# Patient Record
Sex: FEMALE | Race: WHITE | NOT HISPANIC OR LATINO | Employment: FULL TIME | URBAN - METROPOLITAN AREA
[De-identification: names, ages, dates, MRNs, and addresses within clinical notes are randomized per-mention and may not be internally consistent; named-entity substitution may affect disease eponyms.]

---

## 2017-01-25 ENCOUNTER — TELEPHONE (OUTPATIENT)
Dept: FAMILY MEDICINE | Facility: CLINIC | Age: 42
End: 2017-01-25

## 2017-01-25 NOTE — TELEPHONE ENCOUNTER
----- Message from Zita Duong sent at 2017  3:00 PM CST -----  Regarding: RE- LAB  Hi     JULIO Diaz [[id number]] is schedule for lab only appointment on 2017,but her lab orders . Would you extended for us when you have time.    Thanks.    Zita/Lab

## 2017-02-02 ENCOUNTER — OFFICE VISIT (OUTPATIENT)
Dept: FAMILY MEDICINE | Facility: CLINIC | Age: 42
End: 2017-02-02
Payer: COMMERCIAL

## 2017-02-02 VITALS — WEIGHT: 145 LBS | BODY MASS INDEX: 25.69 KG/M2 | TEMPERATURE: 98.8 F | HEIGHT: 63 IN

## 2017-02-02 DIAGNOSIS — Z23 NEED FOR VACCINATION: ICD-10-CM

## 2017-02-02 DIAGNOSIS — Z83.49 FAMILY HISTORY OF HEMOCHROMATOSIS: ICD-10-CM

## 2017-02-02 DIAGNOSIS — Z71.84 TRAVEL ADVICE ENCOUNTER: Primary | ICD-10-CM

## 2017-02-02 LAB
FERRITIN SERPL-MCNC: 91 NG/ML (ref 12–150)
IRON SATN MFR SERPL: 31 % (ref 15–46)
IRON SERPL-MCNC: 76 UG/DL (ref 35–180)
TIBC SERPL-MCNC: 246 UG/DL (ref 240–430)
TRANSFERRIN SERPL-MCNC: 225 MG/DL (ref 210–360)

## 2017-02-02 PROCEDURE — 84466 ASSAY OF TRANSFERRIN: CPT | Performed by: FAMILY MEDICINE

## 2017-02-02 PROCEDURE — 99402 PREV MED CNSL INDIV APPRX 30: CPT | Mod: 25 | Performed by: NURSE PRACTITIONER

## 2017-02-02 PROCEDURE — 90632 HEPA VACCINE ADULT IM: CPT | Mod: GA | Performed by: NURSE PRACTITIONER

## 2017-02-02 PROCEDURE — 83550 IRON BINDING TEST: CPT | Performed by: FAMILY MEDICINE

## 2017-02-02 PROCEDURE — 90472 IMMUNIZATION ADMIN EACH ADD: CPT | Mod: GA | Performed by: NURSE PRACTITIONER

## 2017-02-02 PROCEDURE — 36415 COLL VENOUS BLD VENIPUNCTURE: CPT | Performed by: FAMILY MEDICINE

## 2017-02-02 PROCEDURE — 90691 TYPHOID VACCINE IM: CPT | Mod: GA | Performed by: NURSE PRACTITIONER

## 2017-02-02 PROCEDURE — 82728 ASSAY OF FERRITIN: CPT | Performed by: FAMILY MEDICINE

## 2017-02-02 PROCEDURE — 83540 ASSAY OF IRON: CPT | Performed by: FAMILY MEDICINE

## 2017-02-02 PROCEDURE — 90471 IMMUNIZATION ADMIN: CPT | Mod: GA | Performed by: NURSE PRACTITIONER

## 2017-02-02 PROCEDURE — 81256 HFE GENE: CPT | Performed by: FAMILY MEDICINE

## 2017-02-02 RX ORDER — AZITHROMYCIN 500 MG/1
500 TABLET, FILM COATED ORAL DAILY
Qty: 3 TABLET | Refills: 0 | Status: SHIPPED | OUTPATIENT
Start: 2017-02-02 | End: 2017-02-05

## 2017-02-02 NOTE — PATIENT INSTRUCTIONS
Today February 2, 2017 you received the    Hepatitis A Vaccine - Please return on 8/1/17 or later for your 2nd and final dose.    Typhoid - injectable. This vaccine is valid for two years.   .    These appointments can be made as a NURSE ONLY visit.    **It is very important for the vaccinations to be given on the scheduled day(s), this helps ensure you receive the full effectiveness of the vaccine.**    Please call [facility] with any questions [phone]    Thank you for visiting [facility]

## 2017-02-02 NOTE — PROGRESS NOTES
Nurse Note      Itinerary:  [place]      Departure Date: 2-12-17      Return Date: 2-19-17      Length of Trip 1 week      Reason for Travel: Tourism           Urban or rural: both      Accommodations: Hotel        IMMUNIZATION HISTORY  Have you received any immunizations within the past 4 weeks?  No  Have you ever fainted from having your blood drawn or from an injection?  No  Have you ever had a fever reaction to vaccination?  No  Have you ever had any bad reaction or side effect from any vaccination?  No  Have you ever had hepatitis A or B vaccine?  unknown  Do you live (or work closely) with anyone who has AIDS, an AIDS-like condition, any other immune disorder or who is on chemotherapy for cancer?  No  Do you have a family history of immunodeficiency?  No  Have you received any injection of immune globulin or any blood products during the past 12 months?  No    Patient roomed by JAZMIN Lazo  Yoselyn Mcgill is a 41 year old female seen today alone for counsultation for international travel to [place] for Tourism.  Patient will be departing in  10 day(s) and staying for   1 week(s) and  traveling with family member(s).      Patient itinerary :  will be in the resort region of [place] which presents risk for Dengue Fever, Chikungungya, Zika, Rabies, food borne illnesses, motor vehicle accidents, Typhoid and Chagas disease. exposure.      Patient's activities will include sightseeing and beach activities (salt water).    Patient's country of birth is [place]    Special medical concerns: none  Pre-travel questionnaire was completed by patient and reviewed by provider.     Vitals: There were no vitals taken for this visit.  BMI= There is no weight on file to calculate BMI.    EXAM:  General:  Well-nourished, well-developed in no acute distress.  Appears to be stated age, interacts appropriately and expresses understanding of information given to patient.    Current Outpatient Prescriptions    Medication Sig Dispense Refill     LORazepam (ATIVAN) 0.5 MG tablet Take 1 tablet (0.5 mg) by mouth every 8 hours as needed for anxiety 20 tablet 0     norethindrone (MICRONOR) 0.35 MG per tablet Take 1 tablet (0.35 mg) by mouth daily 28 tablet 11     neomycin-polymyxin-hydrocortisone (CORTISPORIN) 3.5-00567-3 otic suspension Place 4 drops in ear(s) 4 times daily 10 mL 0     Prenatal Multivit-Min-Fe-FA (PRENATAL VITAMINS PO)        Omega-3 Fatty Acids (OMEGA-3 FISH OIL PO)        VITAMIN D, CHOLECALCIFEROL, PO Take by mouth daily       Probiotic Product (PRO-BIOTIC BLEND PO)        Patient Active Problem List   Diagnosis     Dysplasia of cervix     CARDIOVASCULAR SCREENING; LDL GOAL LESS THAN 160     Family history of malignant neoplasm of breast     S/P  section     Family history of hemochromatosis     Allergies   Allergen Reactions     Amoxicillin Rash         Immunizations discussed include:   Hepatitis A:  Ordered/given today, risks, benefits and side effects reviewed  Hepatitis B: Declined  Not concerned about risk of disease  Influenza: Up to date  Typhoid: Ordered/given today, risks, benefits and side effects reviewed  Rabies: Declined  Not concerned about risk of disease  Yellow Fever: Not indicated  Japanese Encephalitis: Not indicated  Meningococcus: Not indicated  Tetanus/Diphtheria: Up to date  Measles/Mumps/Rubella: Up to date  Cholera: Not needed  Polio: Up to date  Pneumococcal: Under age of 65  Varicella: Immune by disease history per patient report  Zostavax:  Not indicated  HPV:  Not indicated  TB:  Low risk    Altitude Exposure on this trip: No    ASSESSMENT/PLAN:    ICD-10-CM    1. Travel advice encounter Z71.89 TYPHOID VACCINE, IM     HEPA VACCINE ADULT IM     azithromycin (ZITHROMAX) 500 MG tablet   2. Need for vaccination Z23 TYPHOID VACCINE, IM     HEPA VACCINE ADULT IM     I have reviewed general recommendations for safe travel   including: food/water precautions, insect  precautions, safer sex   practices given high prevalence of Zika, HIV and other STDs,   roadway safety. Educational materials and [employer] report provided.    Malaraia prophylaxis recommended: none  Symptomatic treatment for traveler's diarrhea: azithromycin  Altitude illness prevention and treatment: no      Evacuation insurance advised and resources were provided to patient.    Total visit time 30 minutes  with over 50% of time spent counseling patient as detailed above.    Irene Martinez CNP

## 2017-02-02 NOTE — MR AVS SNAPSHOT
After Visit Summary   2/2/2017    Yoselyn Mcgill    MRN: [id number]           Patient Information     Date Of Birth          1975        Visit Information        Provider Department      2/2/2017 8:30 AM Irene Martinez APRN [facility]        Today's Diagnoses     Travel advice encounter    -  1     Need for vaccination           Care Instructions    Today February 2, 2017 you received the    Hepatitis A Vaccine - Please return on 8/1/17 or later for your 2nd and final dose.    Typhoid - injectable. This vaccine is valid for two years.   .    These appointments can be made as a NURSE ONLY visit.    **It is very important for the vaccinations to be given on the scheduled day(s), this helps ensure you receive the full effectiveness of the vaccine.**    Please call [facility] with any questions [phone]    Thank you for visiting [facility]            Follow-ups after your visit        Who to contact     If you have questions or need follow up information about [facility]'s clinic visit or your schedule please contact [facility] directly at [phone].  Normal or non-critical lab and imaging results will be communicated to you by [employer], letter or phone within 4 business days after the clinic has received the results. If you do not hear from us within 7 days, please contact the clinic through [email or web address] or phone. If you have a critical or abnormal lab result, we will notify you by phone as soon as possible.  Submit refill requests through Vitals ([email or web address]) or call your pharmacy and they will forward the refill request to us. Please allow 3 business days for your refill to be completed.          Additional Information About Your Visit        [employer] Information     Vitals [email or web address]) gives you secure access to your electronic health record. If you see a primary care provider, you can also send messages to your care team and make appointments. If  "you have questions, please call your primary care clinic.  If you do not have a primary care provider, please call [phone] and they will assist you.        Care EveryWhere ID     This is your Care EveryWhere ID. This could be used by other organizations to access your [facility] medical records  [id number]        Your Vitals Were     Temperature Height BMI (Body Mass Index) Breastfeeding?          98.8  F (37.1  C) (Oral) 5' 3\" (1.6 m) 25.69 kg/m2 Yes         Blood Pressure from Last 3 Encounters:   09/30/16 122/78   06/17/16 121/79   06/15/16 118/76    Weight from Last 3 Encounters:   02/02/17 145 lb (65.772 kg)   09/30/16 140 lb (63.504 kg)   06/17/16 142 lb (64.411 kg)              We Performed the Following     HEPA VACCINE ADULT IM     TYPHOID VACCINE, IM          Today's Medication Changes          These changes are accurate as of: 2/2/17  9:06 AM.  If you have any questions, ask your nurse or doctor.               Start taking these medicines.        Dose/Directions    azithromycin 500 MG tablet   Commonly known as:  ZITHROMAX   Used for:  Travel advice encounter   Started by:  Irene Martinez APRN CNP        Dose:  500 mg   Take 1 tablet (500 mg) by mouth daily for 3 doses Take 1 tablet a day for up to 3 days for severe diarrhea   Quantity:  3 tablet   Refills:  0            Where to get your medicines      These medications were sent to [facility]/pharmacy #[id number] - [place], [place] - [address]  [address], [place] [address]     Phone:  [phone]    - azithromycin 500 MG tablet             Primary Care Provider Office Phone # Fax #    Elisabeth Powell [phone]       [facility] [address] [address]        Thank you!     Thank you for choosing [facility]  for your care. Our goal is always to provide you with excellent care. Hearing back from our patients is one way we can continue to improve our services. Please take " a few minutes to complete the written survey that you may receive in the mail after your visit with us. Thank you!             Your Updated Medication List - Protect others around you: Learn how to safely use, store and throw away your medicines at www.disposemymeds.org.          This list is accurate as of: 2/2/17  9:06 AM.  Always use your most recent med list.                   Brand Name Dispense Instructions for use    azithromycin 500 MG tablet    ZITHROMAX    3 tablet    Take 1 tablet (500 mg) by mouth daily for 3 doses Take 1 tablet a day for up to 3 days for severe diarrhea       LORazepam 0.5 MG tablet    ATIVAN    20 tablet    Take 1 tablet (0.5 mg) by mouth every 8 hours as needed for anxiety       neomycin-polymyxin-hydrocortisone 3.5-96235-9 otic suspension    CORTISPORIN    10 mL    Place 4 drops in ear(s) 4 times daily       norethindrone 0.35 MG per tablet    MICRONOR    28 tablet    Take 1 tablet (0.35 mg) by mouth daily       OMEGA-3 FISH OIL PO          PRENATAL VITAMINS PO          PRO-BIOTIC BLEND PO          VITAMIN D (CHOLECALCIFEROL) PO      Take by mouth daily

## 2017-02-07 ENCOUNTER — TELEPHONE (OUTPATIENT)
Dept: FAMILY MEDICINE | Facility: CLINIC | Age: 42
End: 2017-02-07

## 2017-02-07 PROBLEM — E83.110 HEREDITARY HEMOCHROMATOSIS (H): Status: ACTIVE | Noted: 2017-02-07

## 2017-02-07 LAB — COPATH REPORT: NORMAL

## 2017-02-07 NOTE — TELEPHONE ENCOUNTER
Reason for Call:  Other call back    Detailed comments: pt is looking to speak with NP or nurse in regards to having sleep issues. Wants advise    Phone Number Patient can be reached at: Home number on file [phone] (home)    Best Time: anytime    Can we leave a detailed message on this number? YES    Call taken on 2/7/2017 at 2:27 PM by Luiza Presley

## 2017-02-07 NOTE — TELEPHONE ENCOUNTER
Patient informed; states the 745am and 1030am won't work.  Other appt is from 10-11am.  She will keep 3pm appt and come back then.  Isabella PANCHAL RN

## 2017-02-08 NOTE — PROGRESS NOTES
Quick Note:    Dear Yoselyn,   Your test results are all back -   The iron studies are all normal.  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 The good news is you only have one copy of this mutation which often doesn't manifest clinically. We should consider following your iron tests annually.   Let us know if you have any questions.  -Elisabeth Powell, DO    ______

## 2017-02-10 ENCOUNTER — OFFICE VISIT (OUTPATIENT)
Dept: FAMILY MEDICINE | Facility: CLINIC | Age: 42
End: 2017-02-10
Payer: COMMERCIAL

## 2017-02-10 VITALS
HEART RATE: 84 BPM | WEIGHT: 141.2 LBS | DIASTOLIC BLOOD PRESSURE: 70 MMHG | TEMPERATURE: 99.2 F | HEIGHT: 63 IN | OXYGEN SATURATION: 100 % | SYSTOLIC BLOOD PRESSURE: 116 MMHG | BODY MASS INDEX: 25.02 KG/M2

## 2017-02-10 DIAGNOSIS — F41.1 GENERALIZED ANXIETY DISORDER: ICD-10-CM

## 2017-02-10 DIAGNOSIS — G47.09 OTHER INSOMNIA: Primary | ICD-10-CM

## 2017-02-10 DIAGNOSIS — E83.110 HEREDITARY HEMOCHROMATOSIS (H): ICD-10-CM

## 2017-02-10 PROCEDURE — 99214 OFFICE O/P EST MOD 30 MIN: CPT | Performed by: FAMILY MEDICINE

## 2017-02-10 RX ORDER — TRAZODONE HYDROCHLORIDE 50 MG/1
50 TABLET, FILM COATED ORAL
Qty: 30 TABLET | Refills: 3 | Status: SHIPPED | OUTPATIENT
Start: 2017-02-10 | End: 2017-05-05

## 2017-02-10 RX ORDER — LORAZEPAM 0.5 MG/1
0.5 TABLET ORAL EVERY 8 HOURS PRN
Qty: 20 TABLET | Refills: 0 | Status: SHIPPED | OUTPATIENT
Start: 2017-02-10 | End: 2017-12-30

## 2017-02-10 NOTE — PROGRESS NOTES
"  SUBJECTIVE:                                                    Yoselyn Mcgill is a 41 year old female who presents to clinic today for the following health issues:      insomnia      Duration: 8 weeks    Description (location/character/radiation): no problem falling asleep-its staying asleep    Intensity:  moderate    Accompanying signs and symptoms: n/a    History (similar episodes/previous evaluation): previous issue with anxiety/insomnia--7-10 years ago    Precipitating or alleviating factors: improvement with lorazepam    Therapies tried and outcome: magnesium, some herbs, benadryl, melatonin, unisom, no real improvement--but improvement with the lorazepam     Waking at 1 am - and is up to 6 am  Has 19 month old - wakes some nights -     In the past was treated with xanax, trazodone and seroquel  Recently had MAO-inhibitor - nardil    Feels like lack of sleep is making her more irritable  One arvin tea in AM  Walking most days  Read before going to bed - book  Has magnesium supplement before bed  Worked with acupuncturist and taking herb supplement at bedtime  Tried benadryl, and OTC   Tried melatonin -   Very infrequent ETOH -   Taking lorazepam and when uses to helps fall back asleep easier    -------------------------------------    Problem list and histories reviewed & adjusted, as indicated.  Additional history: as documented    Problem list, Medication list, Allergies, and Medical/Social/Surgical histories reviewed in EPIC and updated as appropriate.    ROS:  Constitutional, HEENT, cardiovascular, pulmonary, gi and gu systems are negative, except as otherwise noted.    OBJECTIVE:                                                    /70 mmHg  Pulse 84  Temp(Src) 99.2  F (37.3  C) (Oral)  Ht 5' 3\" (1.6 m)  Wt 141 lb 3.2 oz (64.048 kg)  BMI 25.02 kg/m2  SpO2 100%  Body mass index is 25.02 kg/(m^2).  GENERAL: healthy, alert and no distress    Diagnostic Test Results:  none      ASSESSMENT/PLAN:  "                                                   1. Other insomnia  Insomnia - suspect related to anxiety but unclear if the anxiety is causing or sleepless nights are causing anxiety  Will try trazodone - nightly as needed - may try 25mg up to 50mg and see what works best -   Discussed risk vs benefits   - traZODone (DESYREL) 50 MG tablet; Take 1 tablet (50 mg) by mouth nightly as needed for sleep  Dispense: 30 tablet; Refill: 3    2. Generalized anxiety disorder  Pt has lorazepam - prn infrequent use -   Patient advised and aware not compatible with breastfeeding - she will avoid for up to 8 hours after taking medication.  - LORazepam (ATIVAN) 0.5 MG tablet; Take 1 tablet (0.5 mg) by mouth every 8 hours as needed for anxiety  Dispense: 20 tablet; Refill: 0    3. Hereditary hemochromatosis (H)  One gene positive -   Discussed asking her father who is symptomatic to check to see if he is homozygote vs heterozygote and what gene he is positive for.   If homozygote - will just monitor patient  If he only has one copy and so symptomatic would have her see genetics      I spent over 25 minutes with patient and over 50% time in counseling regarding above issues.     Elisabeth Powell, DO  [facility]

## 2017-05-02 ENCOUNTER — TELEPHONE (OUTPATIENT)
Dept: FAMILY MEDICINE | Facility: CLINIC | Age: 42
End: 2017-05-02

## 2017-05-02 DIAGNOSIS — G47.09 OTHER INSOMNIA: ICD-10-CM

## 2017-05-02 NOTE — TELEPHONE ENCOUNTER
Noted at 2/10/2017 OV:   Other insomnia  Insomnia - suspect related to anxiety but unclear if the anxiety is causing or sleepless nights are causing anxiety  Will try trazodone - nightly as needed - may try 25mg up to 50mg and see what works best -   Discussed risk vs benefits     Left message for patient to callback.  She's been taking 100mg?  Isabella PANCHAL RN

## 2017-05-02 NOTE — TELEPHONE ENCOUNTER
Reason for Call:  Trazodone    Detailed comments: Patient asked if she can take 2HS she said it works  Better for her and if she can take this please update the RX    [id number] - [place], [place] - [address]  (to Profile New dose at Pharmacy)      Phone Number Patient can be reached at: Home number on file [phone] (home)    Best Time: anytime    Can we leave a detailed message on this number? YES    Call taken on 5/2/2017 at 4:49 PM by Cem Dennis

## 2017-05-05 RX ORDER — TRAZODONE HYDROCHLORIDE 50 MG/1
50-100 TABLET, FILM COATED ORAL
Qty: 60 TABLET | Refills: 5 | Status: SHIPPED | OUTPATIENT
Start: 2017-05-05 | End: 2017-05-26

## 2017-05-05 NOTE — TELEPHONE ENCOUNTER
PN,  Please see below messages.  Tried to reach pt again today to confirm dose she wants.  You mentioned at OV to go up to 50mg   Per below, pt wanting to take 2 tabs (probably 100mg since you Rx'd 50mg tabs)  Are you ok with this?  Isabella PANCHAL RN

## 2017-05-13 ENCOUNTER — TELEPHONE (OUTPATIENT)
Dept: NURSING | Facility: CLINIC | Age: 42
End: 2017-05-13

## 2017-05-13 DIAGNOSIS — G47.09 OTHER INSOMNIA: ICD-10-CM

## 2017-05-14 ENCOUNTER — TELEPHONE (OUTPATIENT)
Dept: NURSING | Facility: CLINIC | Age: 42
End: 2017-05-14

## 2017-05-14 NOTE — TELEPHONE ENCOUNTER
"Call Type: Triage Call    Presenting Problem: \"Has my Trazodone been reordered yet?\" RN then  checked EPIC and saw that this medication was E-Prescribed to pt's  pharmacy, on 5/5/17, and that a receipt was confirmed by pt's  pharmacy. RN told this to pt. and pt. says that she will call her  pharmacy tomorrow AM, about this. Caller had no further questions.  Triage Note:  Guideline Title: Information Only Call; No Symptom Triage (Adult)  Recommended Disposition: Provide Information or Advice Only  Original Inclination: Wanted to speak with a nurse  Override Disposition:  Intended Action: Follow advice given  Physician Contacted: No  Follow-up call to recent contact; no triage required. Information provided from  past call documentation, approved references or experience. ?  YES  Requesting regular office appointment ? NO  Sign(s) or symptom(s) associated with a diagnosed condition or with a new illness  ? NO  Requesting information about provider, services or community resources ? NO  Call back to complete assessment/clarification of information from prior caller to  complete triage ? NO  Requesting information and provider is best resource; no triage required. ? NO  Caller not with patient and is unable to provide clinical information about  patient to facilitate triage. ? NO  Requesting provider information for recently scheduled test, procedure; no triage  required. Needed information not available per approved resources or clinical  experience. ? NO  Requesting information not available per approved reference or clinical  experience; no triage required. ? NO  Requesting information regarding scheduled exam, test or procedure; no triage  required. Information provided from approved resources or clinical experience. ?  NO  General information question; no triage required. Information provided from  approved references or knowledge of organization. ? NO  Health information question; person denies any symptoms, no " triage required.  Information provided from approved references or clinical experience. ? NO  Physician Instructions:  Care Advice:

## 2017-05-14 NOTE — TELEPHONE ENCOUNTER
Call Type: Triage Call    Presenting Problem:  Has Appt in morning  because has been  feeling  run down for last 4 days , and last week having what she suspects is  eczema- 1 inch  patches on  abdomen   & chest  area . Hx of  Sensitive skin.  Reviewed eczema health education with  Caller .  Triage Note:  Guideline Title: Information Only Call; No Symptom Triage (Adult)  Recommended Disposition: Provide Information or Advice Only  Original Inclination: Did not know what to do  Override Disposition:  Intended Action: See Dr/Make Appt  Physician Contacted: No  General information question; no triage required. Information provided from  approved references or knowledge of organization. ?  YES  Requesting regular office appointment ? NO  Sign(s) or symptom(s) associated with a diagnosed condition or with a new illness  ? NO  Requesting information about provider, services or community resources ? NO  Call back to complete assessment/clarification of information from prior caller to  complete triage ? NO  Requesting information and provider is best resource; no triage required. ? NO  Caller not with patient and is unable to provide clinical information about  patient to facilitate triage. ? NO  Requesting provider information for recently scheduled test, procedure; no triage  required. Needed information not available per approved resources or clinical  experience. ? NO  Requesting information not available per approved reference or clinical  experience; no triage required. ? NO  Requesting information regarding scheduled exam, test or procedure; no triage  required. Information provided from approved resources or clinical experience. ?  NO  Health information question; person denies any symptoms, no triage required.  Information provided from approved references or clinical experience. ? NO  Physician Instructions:  Care Advice:

## 2017-05-15 RX ORDER — TRAZODONE HYDROCHLORIDE 50 MG/1
TABLET, FILM COATED ORAL
Start: 2017-05-15

## 2017-05-15 NOTE — TELEPHONE ENCOUNTER
Denied  Refill request too early; Rx sent 5/5/2017 #60 with 5 refills  Isabella PANCHAL RN    Pending Prescriptions:                       Disp   Refills    traZODone (DESYREL) 50 MG tablet [Pharmacy*30 tab*3        Sig: TAKE 1 TABLET (50 MG) BY MOUTH NIGHTLY AS NEEDED FOR           SLEEP         Last Written Prescription Date: 5/5/2017  Last Fill Quantity: 60; # refills: 5  Last Office Visit with G, P or  Health prescribing provider:          Last PHQ-9 score on record=   PHQ-9 SCORE 9/30/2016   Total Score -   Total Score 2       No results found for: AST  No results found for: ALT

## 2017-05-20 ENCOUNTER — TELEPHONE (OUTPATIENT)
Dept: NURSING | Facility: CLINIC | Age: 42
End: 2017-05-20

## 2017-05-20 NOTE — TELEPHONE ENCOUNTER
Call Type: Triage Call    Presenting Problem: Diarrhea started early this AM; advised to not  take antibiotic and to be seen within 72 hours if no improvement or  worsening symptoms.  Triage Note:  Guideline Title: Diarrhea or Other Change in Bowel Habits  Recommended Disposition: Provide Home/Self Care  Original Inclination:  Override Disposition:  Intended Action:  Physician Contacted: No  All other situations ?  YES  Signs of dehydration ? NO  Using laxatives for weight loss ? NO  New or worsening signs and symptoms that may indicate shock ? NO  New onset of diarrhea AND any temperature elevation in an immunocompromised  individual or frail elderly ? NO  Severe pain/cramping in abdomen or rectum that interferes with ability to carry  out normal activities or sleep ? NO  Diarrhea associated with new abdominal bloating, distension or swelling ? NO  Evaluated by provider AND symptoms not improving or worsening with suggested  treatment or home care ? NO  Change in character of bowel movements ? NO  Diarrhea occurs only when eating certain foods ? NO  Diarrhea lasting longer than 72 hours AND not improving with home care ? NO  Receiving intermittent or continuous tube feedings AND not responding to 24 hours  of home care ? NO  Alternating constipation and diarrhea AND not previously evaluated ? NO  Symptoms worsening despite provider recommended treatment AND known to have  irritable bowel syndrome, pancreatic insufficiency, inflammatory bowel disease,  celiac disease or malabsorption syndrome ? NO  Passing red, black or tarry material from rectum AND onset of new signs and  symptoms of hypovolemia ? NO  Diarrhea (without blood in stool) following crampy abdominal pain AND repeated  vomiting that has not improved with 3 days of home care ? NO  New onset diarrhea with any of the following: mild abdominal cramping, generalized  aching, temperature up to 101.5 F (38.6 C) or several episodes of nausea/vomiting  ?  NO  Sudden onset of diarrhea, usually with abdominal pain, nausea and sometimes  vomiting, occurring within 36 hours after eating unpasteurized, raw or  undercooked foods OR drinking unpurified or nonchlorinated water ? NO  Worms visible in stool/toilet AND not previously evaluated ? NO  Rectal bleeding (blood in or on the stool, more than scant; black tarry stools) ?  NO  Associated with anxiety, emotional stress, or sleep disturbances ? NO  Diarrhea began 3-5 days after starting an antibiotic ? NO  Bloody diarrhea AND has not been evaluated ? NO  Diarrhea began after any gastrointestinal (GI) surgery or procedure and is lasting  longer than defined in provider specified discharge information ? NO  Known diabetic and diarrhea or other change in bowel habits ? NO  Symptoms began after a change or starting a new prescription or nonprescription  medicine or alternative medicine / therapy within last 4 weeks ? NO  High to low (but not zero) risk of exposure to Ebola within the past 21 days ? NO  Traveled out of country in past 2 months and new onset of unexplained symptom(s) ?  NO  Debilitated or bedridden patient having uncontrolled bowel movements AND has  history of stool impaction ? NO  New onset of diarrhea with temperature of 101.5 F (38.6 C) or greater AND symptoms  not improving with 12 hours of home care ? NO  Diarrhea began with current or recent radiation or chemotherapy AND is not  improving with 24 hours of home care ? NO  Physician Instructions:  Care Advice:

## 2017-05-23 ENCOUNTER — TELEPHONE (OUTPATIENT)
Dept: FAMILY MEDICINE | Facility: CLINIC | Age: 42
End: 2017-05-23

## 2017-05-23 NOTE — TELEPHONE ENCOUNTER
PN,  Patient wondering if she is building up tolerance to Trazodone  States at last OV you mentioned building tolerance is not possible.  Pt states two months ago when taking Trazodone slept all night soundly  Now, waking up a bit more and being more anxious when she does way up.  Wanting to know if she should/can increase to 150mg daily.  Would you like telephone visit to further discuss?  Please advise.  Isabella PANCHAL RN

## 2017-05-23 NOTE — TELEPHONE ENCOUNTER
Reason for Call:  Other call back prescription    Detailed comments: trazadone pt has question about dosing please call. 100mg dose last few nights feeling effective    Phone Number Patient can be reached at: Home number on file [phone] (home)    Best Time: any    Can we leave a detailed message on this number? YES    Call taken on 5/23/2017 at 11:09 AM by Marybel Gibbs

## 2017-05-23 NOTE — TELEPHONE ENCOUNTER
Left detailed VM for patient to callback and schedule either TV or OV regarding insomnia.  Isabella PANCHAL RN

## 2017-05-26 ENCOUNTER — VIRTUAL VISIT (OUTPATIENT)
Dept: FAMILY MEDICINE | Facility: CLINIC | Age: 42
End: 2017-05-26
Payer: COMMERCIAL

## 2017-05-26 DIAGNOSIS — F41.1 GENERALIZED ANXIETY DISORDER: Primary | ICD-10-CM

## 2017-05-26 DIAGNOSIS — G47.09 OTHER INSOMNIA: ICD-10-CM

## 2017-05-26 PROCEDURE — 99442 ZZC PHYSICIAN TELEPHONE EVALUATION 11-20 MIN: CPT | Performed by: FAMILY MEDICINE

## 2017-05-26 RX ORDER — TRAZODONE HYDROCHLORIDE 50 MG/1
150 TABLET, FILM COATED ORAL
Qty: 90 TABLET | Refills: 5 | Status: SHIPPED | OUTPATIENT
Start: 2017-05-26 | End: 2017-07-19

## 2017-05-26 NOTE — PROGRESS NOTES
"Yoselyn Mcgill is a 41 year old female who is being evaluated via a telephone visit.      The patient has been notified of following:     \"This telephone visit will be conducted via a call between you and your physician/provider. We have found that certain health care needs can be provided without the need for a physical exam.  This service lets us provide the care you need with a short phone conversation.  If a prescription is necessary we can send it directly to your pharmacy.  If lab work is needed we can place an order for that and you can then stop by our lab to have the test done at a later time.    We will bill your insurance company for this service.  Please check with your medical insurance if this type of visit is covered. You may be responsible for the cost of this type of visit if insurance coverage is denied.  The typical cost is $30 (10min), $59 (11-20min) and $85 (21-30min).  Most often these visits are shorter than 10 minutes.    If during the course of the call the physician/provider feels a telephone visit is not appropriate, you will not be charged for this service.\"       Consent has been obtained for this service by 2 care team members: yes. See the scanned image in the medical record.    Yoselyn Mcgill complains of  No chief complaint on file.      I have reviewed and updated the patient's Past Medical History, Social History, Family History and Medication List.    ALLERGIES  Amoxicillin    Lupe Kay CMA   (MA signature)    Additional provider notes:   Has had for a while -   She was last evaluated in February   Started on Trazodone and 50mg was helpful  Had to go up to 100mg and now wonders about 150mg   Sleep issues - last night took both lorazepam and trazodone   Fell asleep but woke at 11 pm  and took another 50mg more of trazodone and had restful sleep waking q2 hours  Exercising regularly, watching caffeine, good sleep hygiene    Discussed anxiety   Stressful that her pet is " sick and not doing well  Also has hx of depression and PTSD   In remote past was on nardil (MAOI and it was the most helpful.    Assessment/Plan:  (F41.1) Generalized anxiety disorder  (primary encounter diagnosis)  Comment: suspect it is affecting her sleep  Still breastfeeding so discussed options -   Will try low dose sertraline   F/u 4-6 weeks  Plan: sertraline (ZOLOFT) 50 MG tablet             (G47.09) Other insomnia  Comment:    Plan: traZODone (DESYREL) 50 MG tablet                 I have reviewed the note as documented above.  This accurately captures the substance of my conversation with the patient,  Yoselyn Mcgill     Total time of call between patient and provider was 15 minutes

## 2017-05-26 NOTE — MR AVS SNAPSHOT
After Visit Summary   5/26/2017    Yoselyn Mcgill    MRN: [id number]           Patient Information     Date Of Birth          1975        Visit Information        Provider Department      5/26/2017 11:15 AM Elisabeth Powell, DO [facility]        Today's Diagnoses     Generalized anxiety disorder    -  1    Other insomnia           Follow-ups after your visit        Who to contact     If you have questions or need follow up information about today's clinic visit or your schedule please contact [facility] directly at [phone].  Normal or non-critical lab and imaging results will be communicated to you by H[employer], letter or phone within 4 business days after the clinic has received the results. If you do not hear from us within 7 days, please contact the clinic through [employer] or phone. If you have a critical or abnormal lab result, we will notify you by phone as soon as possible.  Submit refill requests through [employer] or call your pharmacy and they will forward the refill request to us. Please allow 3 business days for your refill to be completed.          Additional Information About Your Visit        MyChart Information     [employer] gives you secure access to your electronic health record. If you see a primary care provider, you can also send messages to your care team and make appointments. If you have questions, please call your primary care clinic.  If you do not have a primary care provider, please call [phone] and they will assist you.        Care EveryWhere ID     This is your Care EveryWhere ID. This could be used by other organizations to access your [place] medical records  [id number]         Blood Pressure from Last 3 Encounters:   02/10/17 116/70   09/30/16 122/78   06/17/16 121/79    Weight from Last 3 Encounters:   02/10/17 141 lb 3.2 oz (64 kg)   02/02/17 145 lb (65.8 kg)   09/30/16 140 lb (63.5 kg)              Today, you had the following      No orders found for display         Today's Medication Changes          These changes are accurate as of: 5/26/17  5:19 PM.  If you have any questions, ask your nurse or doctor.               Start taking these medicines.        Dose/Directions    sertraline 50 MG tablet   Commonly known as:  ZOLOFT   Used for:  Generalized anxiety disorder        Take 1/2 tablet (25 mg) for 1-2 weeks, then increase to 1 tablet orally daily   Quantity:  30 tablet   Refills:  1         These medicines have changed or have updated prescriptions.        Dose/Directions    traZODone 50 MG tablet   Commonly known as:  DESYREL   This may have changed:  how much to take   Used for:  Other insomnia        Dose:  150 mg   Take 3 tablets (150 mg) by mouth nightly as needed for sleep   Quantity:  90 tablet   Refills:  5         Stop taking these medicines if you haven't already. Please contact your care team if you have questions.     norethindrone 0.35 MG per tablet   Commonly known as:  MICRONOR                Where to get your medicines      These medications were sent to [facility] [address] [place] [address] AT [address] [address]     Phone:  [phone]     sertraline 50 MG tablet    traZODone 50 MG tablet                Primary Care Provider Office Phone # Fax #    Elisabeth PAULINE Powell [phone]       [address] [address]        Thank you!     Thank you for choosing [facility]  for your care. Our goal is always to provide you with excellent care. Hearing back from our patients is one way we can continue to improve our services. Please take a few minutes to complete the written survey that you may receive in the mail after your visit with us. Thank you!             Your Updated Medication List - Protect others around you: Learn how to safely use, store and throw away your medicines at www.disposemymeds.org.           This list is accurate as of: 5/26/17  5:19 PM.  Always use your most recent med list.                   Brand Name Dispense Instructions for use    LORazepam 0.5 MG tablet    ATIVAN    20 tablet    Take 1 tablet (0.5 mg) by mouth every 8 hours as needed for anxiety       OMEGA-3 FISH OIL PO          PRENATAL VITAMINS PO          PRO-BIOTIC BLEND PO          sertraline 50 MG tablet    ZOLOFT    30 tablet    Take 1/2 tablet (25 mg) for 1-2 weeks, then increase to 1 tablet orally daily       traZODone 50 MG tablet    DESYREL    90 tablet    Take 3 tablets (150 mg) by mouth nightly as needed for sleep       VITAMIN D (CHOLECALCIFEROL) PO      Take by mouth daily

## 2017-06-01 ENCOUNTER — TELEPHONE (OUTPATIENT)
Dept: FAMILY MEDICINE | Facility: CLINIC | Age: 42
End: 2017-06-01

## 2017-06-01 NOTE — TELEPHONE ENCOUNTER
PN,  Patient did phone visit with you 5/26/2017.  Sertraline started and Trazodone dose increased.    Patient states she has been on the Sertraline for 6 days now.  Doesn't feel it is helping at all.  Actually causing more nervousness for her.  Still only on 1/2 tablet.   Is ok with increasing to 1 tab now if you recommend.  Otherwise ok with alternative if needed   She is breastfeeding.  Did re-enforce that it can take 4-6 weeks for this med to become fully effective.    Now taking Trazodone 150mg   Has been on this dose for 2 days.  Is able to fall asleep without difficulties, but still waking up multiple times a night and feels more sluggish during the day on this dose.  Wondering if insomnia med needs to be further adjusted    Please advise.  Isabella PANCHAL RN

## 2017-06-01 NOTE — TELEPHONE ENCOUNTER
Reason for Call:  Other prescription    Detailed comments: started zoloft pt is not feeling any better with med been on since Saturday and anxiety is worse now real nervous. Trazadone is not helping pt sleep at higher dose on benefit at 150 mg just more sluggish. Please call    Phone Number Patient can be reached at: Cell number on file:    Telephone Information:   Mobile [phone]       Best Time: any    Can we leave a detailed message on this number? YES    Call taken on 6/1/2017 at 12:06 PM by Marybel Gibbs

## 2017-06-02 NOTE — TELEPHONE ENCOUNTER
It can take the medication 4-6 weeks to see the benefit -   Typically after 2-3 weeks she should be a little better. I'd like to hold off switching if she can tolerate it.  She can try the higher dose but if much worse go back down to the 25mg.  Regarding her trazodone - she can go up to 200mg but that is the max dose.  Can try while the other med takes a while to kick in.  Thanks  PN

## 2017-06-02 NOTE — TELEPHONE ENCOUNTER
Informed patient of below  She agrees with this plan.    Patient wondering if ok to take Ativan during this time because of nervousness.  Pt did take 1 tab today; said PN advised she take very little of this since breastfeeding.    Advised pt try not to take the Ativan unless needed.  Can try 1/2 dose as well if needed  Told pt it's hard to determine if the Trazodone or Sertraline dose changes are causing anxiety improvement if taking Ativan regularly as well.  Pt agrees with this and will hold off of Ativan use unless absolutely needed.    Will callback if needed and keep PCP updated on doses.  Isabella PANCHAL RN

## 2017-06-20 ENCOUNTER — TELEPHONE (OUTPATIENT)
Dept: FAMILY MEDICINE | Facility: CLINIC | Age: 42
End: 2017-06-20

## 2017-06-20 NOTE — TELEPHONE ENCOUNTER
Reason for Call:  Other call back    Detailed comments: Pt is complaining of tiny bite marks on her and her 's foot and is looking for advice as to what to do and what it could be. This happened after spending the night in a hotel. Please give pt a call back ASAP.    Phone Number Patient can be reached at: Cell number on file:    Telephone Information:   Mobile [phone]       Best Time:     Can we leave a detailed message on this number? YES    Call taken on 6/20/2017 at 8:24 AM by Lucero Simmons

## 2017-06-20 NOTE — TELEPHONE ENCOUNTER
LS  Please address due to PN's absence.  Patient called and states she has tiny bites on her feet.  Her  woke up with bites on his lower legs  Recently stayed a a hotel.  Not sure what it could be. Haven't seen any bed bugs  Patient's bites are getting better.  's are new today.  Asking for advise.    Recommended they try Hydrocortisone cream, wash all bedding in hot water, vacuum mattress and suitcase they had with them in hotel.  Wash all clothes they had with them.    If doesn't improve/worsens advised they be seen.    Anything else you would advise?  Elizabeth Elder, DONY

## 2017-06-20 NOTE — TELEPHONE ENCOUNTER
Yes, that sounds food and if no improvement , she would need to be seen   As it could be scabies  Can you let her know that ?  Thanks

## 2017-06-22 ENCOUNTER — MYC MEDICAL ADVICE (OUTPATIENT)
Dept: FAMILY MEDICINE | Facility: CLINIC | Age: 42
End: 2017-06-22

## 2017-06-22 DIAGNOSIS — F41.1 GENERALIZED ANXIETY DISORDER: ICD-10-CM

## 2017-06-22 NOTE — TELEPHONE ENCOUNTER
PN wanted pt to f/u in 4-6 weeks regarding anxiety from 5/26/17  Sent [employer] asking for update on anxiety and how meds are working.  Will wait for pt response,

## 2017-06-26 NOTE — TELEPHONE ENCOUNTER
Patient has not read [employer] message  Left message for patient to callback or check her MyChart  Isabella PANCHAL RN

## 2017-06-28 NOTE — TELEPHONE ENCOUNTER
PN,  See below messages regarding refill request  No response from pt  Please advise on refill  Isabella PANCHAL RN

## 2017-07-14 ENCOUNTER — TELEPHONE (OUTPATIENT)
Dept: FAMILY MEDICINE | Facility: CLINIC | Age: 42
End: 2017-07-14

## 2017-07-14 NOTE — TELEPHONE ENCOUNTER
PN,  I scheduled pt into your 4:00 15 min opening on Wednesday 7/19 but asked her to come in at 3:45.  If not ok, let me know.  Thanks,  Tori Edwards RN

## 2017-07-14 NOTE — TELEPHONE ENCOUNTER
PN,  Patient states for past 2 weeks has been more fatigued than usual.  Believes the cause is her newly started Sertraline  Has been on Sertraline 50mg in the AM for ~6 weeks now.  At 25mg felt she was having increase in anxiety.  Wondering what to do next  If she should try to go down to 25mg again.  Or alternate 25mg one day and 50mg the next and so on.  Willing to do telephone visit with you if needed  Also wants to know when you want to see her.  Please advise.  Isabella PANCHAL RN

## 2017-07-14 NOTE — TELEPHONE ENCOUNTER
Reason for call:  Patient reporting a symptom    Symptom or request: pt. Has been feeling very lethargic    Duration (how long have symptoms been present): few weeks    Have you been treated for this before? Yes    Additional comments: pt. Was put on Zoloft about 6 weeks ago.  She is wondering if she might need to cut down  On the dosage of her medication?  Should she be seen?  Please advise.    Phone Number patient can be reached at:  Home number on file [phone] (home)    Best Time:  Any time    Can we leave a detailed message on this number:  YES    Call taken on 7/14/2017 at 8:52 AM by Mary Kay Cardenas.

## 2017-07-19 ENCOUNTER — OFFICE VISIT (OUTPATIENT)
Dept: FAMILY MEDICINE | Facility: CLINIC | Age: 42
End: 2017-07-19
Payer: COMMERCIAL

## 2017-07-19 VITALS
TEMPERATURE: 99.9 F | BODY MASS INDEX: 25.52 KG/M2 | HEIGHT: 63 IN | WEIGHT: 144 LBS | SYSTOLIC BLOOD PRESSURE: 120 MMHG | OXYGEN SATURATION: 98 % | HEART RATE: 68 BPM | DIASTOLIC BLOOD PRESSURE: 78 MMHG

## 2017-07-19 DIAGNOSIS — E83.110 HEREDITARY HEMOCHROMATOSIS (H): ICD-10-CM

## 2017-07-19 DIAGNOSIS — G47.09 OTHER INSOMNIA: ICD-10-CM

## 2017-07-19 DIAGNOSIS — F41.1 GENERALIZED ANXIETY DISORDER: ICD-10-CM

## 2017-07-19 DIAGNOSIS — L70.0 ACNE VULGARIS: ICD-10-CM

## 2017-07-19 DIAGNOSIS — F41.1 GENERALIZED ANXIETY DISORDER: Primary | ICD-10-CM

## 2017-07-19 PROCEDURE — 80053 COMPREHEN METABOLIC PANEL: CPT | Performed by: FAMILY MEDICINE

## 2017-07-19 PROCEDURE — 99214 OFFICE O/P EST MOD 30 MIN: CPT | Performed by: FAMILY MEDICINE

## 2017-07-19 PROCEDURE — 36415 COLL VENOUS BLD VENIPUNCTURE: CPT | Performed by: FAMILY MEDICINE

## 2017-07-19 RX ORDER — TRAZODONE HYDROCHLORIDE 50 MG/1
100 TABLET, FILM COATED ORAL AT BEDTIME
Qty: 60 TABLET | Refills: 5 | Status: SHIPPED | OUTPATIENT
Start: 2017-07-19 | End: 2017-07-19

## 2017-07-19 RX ORDER — CLINDAMYCIN PHOSPHATE 10 UG/ML
LOTION TOPICAL 2 TIMES DAILY
Qty: 60 ML | Refills: 11 | Status: SHIPPED | OUTPATIENT
Start: 2017-07-19 | End: 2018-08-31

## 2017-07-19 ASSESSMENT — ANXIETY QUESTIONNAIRES
7. FEELING AFRAID AS IF SOMETHING AWFUL MIGHT HAPPEN: NOT AT ALL
IF YOU CHECKED OFF ANY PROBLEMS ON THIS QUESTIONNAIRE, HOW DIFFICULT HAVE THESE PROBLEMS MADE IT FOR YOU TO DO YOUR WORK, TAKE CARE OF THINGS AT HOME, OR GET ALONG WITH OTHER PEOPLE: NOT DIFFICULT AT ALL
6. BECOMING EASILY ANNOYED OR IRRITABLE: NOT AT ALL
5. BEING SO RESTLESS THAT IT IS HARD TO SIT STILL: NOT AT ALL
1. FEELING NERVOUS, ANXIOUS, OR ON EDGE: SEVERAL DAYS
2. NOT BEING ABLE TO STOP OR CONTROL WORRYING: NOT AT ALL
GAD7 TOTAL SCORE: 2
3. WORRYING TOO MUCH ABOUT DIFFERENT THINGS: NOT AT ALL

## 2017-07-19 ASSESSMENT — PATIENT HEALTH QUESTIONNAIRE - PHQ9: 5. POOR APPETITE OR OVEREATING: SEVERAL DAYS

## 2017-07-19 NOTE — PROGRESS NOTES
SUBJECTIVE:                                                    Yoselyn Mcgill is a 41 year old female who presents to clinic today for the following health issues:      Fatigue       Duration: 3 weeks - better this last weeks     Description (location/character/radiation): na    Intensity:  mild    Accompanying signs and symptoms:  First few weeks anxiety was worst - seen improvement with sleep, seen decrease in sex drive     History (similar episodes/previous evaluation): None    Therapies tried and outcome: None     Started on the zoloft - on last telephone visit    Went up to the 50mg since week 2  -     Two weeks ago felt like energy level was very low - drank a few cups of coffee that she had given up.    Going to [place] - twice a week   Has added long bike rides on the weekends   All have occurred over the last few months    zoloft - slight decrease in sexual drive   ?if the lower energy was side effect    Sleep is better - taking 2 tablets Trazodone 100mg - falls back to sleep better     -    Periods were a little heavier - more clots with the period      -------------------------------------    Problem list and histories reviewed & adjusted, as indicated.  Additional history: as documented    Patient Active Problem List   Diagnosis     Dysplasia of cervix     CARDIOVASCULAR SCREENING; LDL GOAL LESS THAN 160     Family history of malignant neoplasm of breast     S/P  section     Family history of hemochromatosis     Hereditary hemochromatosis (H)     Past Surgical History:   Procedure Laterality Date     ANKLE SURGERY  08/10/11      SECTION N/A 2015    Procedure:  SECTION;  Surgeon: Emma Kendall MD;  Location:  L+D     ELECTROCONVULSIVE THERAPY  -    [facility] Dr Carpenter       Social History   Substance Use Topics     Smoking status: Never Smoker     Smokeless tobacco: Never Used     Alcohol use 0.0 oz/week     0 Standard drinks or  "equivalent per week      Comment: socially     Family History   Problem Relation Age of Onset     C.A.D. Mother      C.A.D. Maternal Grandfather      C.A.D. Paternal Grandfather      Hypertension Mother      Hypertension Maternal Grandfather      Breast Cancer Mother      Alcohol/Drug Maternal Grandfather      Depression Paternal Grandmother              Reviewed and updated as needed this visit by clinical staffTobacco  Allergies  Meds  Med Hx  Surg Hx  Fam Hx  Soc Hx      Reviewed and updated as needed this visit by Provider         ROS:  Constitutional, HEENT, cardiovascular, pulmonary, GI, , musculoskeletal, neuro, skin, endocrine and psych systems are negative, except as otherwise noted.      OBJECTIVE:   /78  Pulse 68  Temp 99.9  F (37.7  C) (Oral)  Ht 5' 3\" (1.6 m)  Wt 144 lb (65.3 kg)  SpO2 98%  BMI 25.51 kg/m2  Body mass index is 25.51 kg/(m^2).  GENERAL: healthy, alert and no distress  PSYCH: mentation appears normal, affect normal/bright    Diagnostic Test Results:  pending    ASSESSMENT/PLAN:     1. Generalized anxiety disorder  Anxiety -   Discussed her symptoms -   Had 2 weeks of fatigue - doubt that is the medication side effect because would have started earlier and or persisted this week  Could be hormonal  Will continue currently low dose of the zoloft  - sertraline (ZOLOFT) 50 MG tablet; Take 1 tablet (50 mg) by mouth daily  Dispense: 90 tablet; Refill: 1  - Comprehensive metabolic panel (BMP + Alb, Alk Phos, ALT, AST, Total. Bili, TP)    2. Other insomnia  Reviewed trazodone is to help sleep -  Taking 2 at night  Can try to go down and see if one is as helpful  - traZODone (DESYREL) 50 MG tablet; Take 2 tablets (100 mg) by mouth At Bedtime  Dispense: 60 tablet; Refill: 5  - Comprehensive metabolic panel (BMP + Alb, Alk Phos, ALT, AST, Total. Bili, TP)    3. Acne vulgaris  Will try clindamycin  Once breastfeeding over can try adapalene OTC  - clindamycin (CLINDAMAX) 1 % lotion; " Apply topically 2 times daily  Dispense: 60 mL; Refill: 11  - Comprehensive metabolic panel (BMP + Alb, Alk Phos, ALT, AST, Total. Bili, TP)    4. Hereditary hemochromatosis (H)  Will find out if father can get copy of lab to see if heterozygote or homozygote            Elisabeth Powell, DO  [facility]

## 2017-07-19 NOTE — MR AVS SNAPSHOT
"              After Visit Summary   7/19/2017    Yoselyn Mcgill    MRN: [id number]           Patient Information     Date Of Birth          1975        Visit Information        Provider Department      7/19/2017 3:45 PM Elisabeth Powell DO [facility]        Today's Diagnoses     Generalized anxiety disorder    -  1    Other insomnia        Acne vulgaris        Hereditary hemochromatosis (H)           Follow-ups after your visit        Who to contact     If you have questions or need follow up information about today's clinic visit or your schedule please contact [facility] directly at [phone].  Normal or non-critical lab and imaging results will be communicated to you by MyChart, letter or phone within 4 business days after the clinic has received the results. If you do not hear from us within 7 days, please contact the clinic through Bunchhart or phone. If you have a critical or abnormal lab result, we will notify you by phone as soon as possible.  Submit refill requests through 21GRAMS or call your pharmacy and they will forward the refill request to us. Please allow 3 business days for your refill to be completed.          Additional Information About Your Visit        MyChart Information     21GRAMS gives you secure access to your electronic health record. If you see a primary care provider, you can also send messages to your care team and make appointments. If you have questions, please call your primary care clinic.  If you do not have a primary care provider, please call [phone] and they will assist you.        Care EveryWhere ID     This is your Care EveryWhere ID. This could be used by other organizations to access your [place] medical records  [id number]        Your Vitals Were     Pulse Temperature Height Pulse Oximetry BMI (Body Mass Index)       68 99.9  F (37.7  C) (Oral) 5' 3\" (1.6 m) 98% 25.51 kg/m2        Blood Pressure from Last 3 Encounters:   07/19/17 " 120/78   02/10/17 116/70   09/30/16 122/78    Weight from Last 3 Encounters:   07/19/17 144 lb (65.3 kg)   02/10/17 141 lb 3.2 oz (64 kg)   02/02/17 145 lb (65.8 kg)              We Performed the Following     Comprehensive metabolic panel (BMP + Alb, Alk Phos, ALT, AST, Total. Bili, TP)          Today's Medication Changes          These changes are accurate as of: 7/19/17  5:34 PM.  If you have any questions, ask your nurse or doctor.               Start taking these medicines.        Dose/Directions    clindamycin 1 % lotion   Commonly known as:  CLINDAMAX   Used for:  Acne vulgaris   Started by:  Elisabeth Powell DO        Apply topically 2 times daily   Quantity:  60 mL   Refills:  11         These medicines have changed or have updated prescriptions.        Dose/Directions    traZODone 50 MG tablet   Commonly known as:  DESYREL   This may have changed:    - how much to take  - when to take this  - reasons to take this   Used for:  Other insomnia   Changed by:  Elisabeth Powell DO        Dose:  100 mg   Take 2 tablets (100 mg) by mouth At Bedtime   Quantity:  60 tablet   Refills:  5            Where to get your medicines      These medications were sent to [facility] [address] [place] [address] AT [address] [address]     Phone:  [phone]     clindamycin 1 % lotion    sertraline 50 MG tablet    traZODone 50 MG tablet                Primary Care Provider Office Phone # Fax #    Elisabeth Powell [phone]       [facility] [address] [address]        Equal Access to Services     [facility]: Hadii maida lamar Sogregg, waaxda luqadaha, qaybta kaalmada adeegyada, sophie bartlett. So [facility] [phone].    ATENCIÓN: Si habla español, tiene a matthew disposición servicios gratuitos de asistencia lingüística. Llame al [phone].    We comply with applicable federal civil  rights laws and [place] laws. We do not discriminate on the basis of race, color, national origin, age, disability sex, sexual orientation or gender identity.            Thank you!     Thank you for choosing [facility]  for your care. Our goal is always to provide you with excellent care. Hearing back from our patients is one way we can continue to improve our services. Please take a few minutes to complete the written survey that you may receive in the mail after your visit with us. Thank you!             Your Updated Medication List - Protect others around you: Learn how to safely use, store and throw away your medicines at www.disposemymeds.org.          This list is accurate as of: 7/19/17  5:34 PM.  Always use your most recent med list.                   Brand Name Dispense Instructions for use Diagnosis    clindamycin 1 % lotion    CLINDAMAX    60 mL    Apply topically 2 times daily    Acne vulgaris       LORazepam 0.5 MG tablet    ATIVAN    20 tablet    Take 1 tablet (0.5 mg) by mouth every 8 hours as needed for anxiety    Generalized anxiety disorder       OMEGA-3 FISH OIL PO           PRENATAL VITAMINS PO           PRO-BIOTIC BLEND PO           sertraline 50 MG tablet    ZOLOFT    90 tablet    Take 1 tablet (50 mg) by mouth daily    Generalized anxiety disorder       traZODone 50 MG tablet    DESYREL    60 tablet    Take 2 tablets (100 mg) by mouth At Bedtime    Other insomnia       VITAMIN D (CHOLECALCIFEROL) PO      Take by mouth daily

## 2017-07-19 NOTE — NURSING NOTE
"Chief Complaint   Patient presents with     Fatigue     /78  Pulse 68  Temp 99.9  F (37.7  C) (Oral)  Ht 5' 3\" (1.6 m)  Wt 144 lb (65.3 kg)  SpO2 98%  BMI 25.51 kg/m2 Estimated body mass index is 25.51 kg/(m^2) as calculated from the following:    Height as of this encounter: 5' 3\" (1.6 m).    Weight as of this encounter: 144 lb (65.3 kg).  BP completed using cuff size: regular       Health Maintenance due pending provider review:  PHQ9    PHQ/ACT/YANIRA--Gave pt questionnare      Kirsten Ribeiro CMA  "

## 2017-07-20 LAB
ALBUMIN SERPL-MCNC: 4 G/DL (ref 3.4–5)
ALP SERPL-CCNC: 46 U/L (ref 40–150)
ALT SERPL W P-5'-P-CCNC: 18 U/L (ref 0–50)
ANION GAP SERPL CALCULATED.3IONS-SCNC: 6 MMOL/L (ref 3–14)
AST SERPL W P-5'-P-CCNC: 13 U/L (ref 0–45)
BILIRUB SERPL-MCNC: 0.3 MG/DL (ref 0.2–1.3)
BUN SERPL-MCNC: 15 MG/DL (ref 7–30)
CALCIUM SERPL-MCNC: 8.9 MG/DL (ref 8.5–10.1)
CHLORIDE SERPL-SCNC: 107 MMOL/L (ref 94–109)
CO2 SERPL-SCNC: 25 MMOL/L (ref 20–32)
CREAT SERPL-MCNC: 0.86 MG/DL (ref 0.52–1.04)
GFR SERPL CREATININE-BSD FRML MDRD: 72 ML/MIN/1.7M2
GLUCOSE SERPL-MCNC: 88 MG/DL (ref 70–99)
POTASSIUM SERPL-SCNC: 4.1 MMOL/L (ref 3.4–5.3)
PROT SERPL-MCNC: 7.8 G/DL (ref 6.8–8.8)
SODIUM SERPL-SCNC: 138 MMOL/L (ref 133–144)

## 2017-07-20 RX ORDER — TRAZODONE HYDROCHLORIDE 50 MG/1
TABLET, FILM COATED ORAL
Qty: 180 TABLET | Refills: 1 | Status: SHIPPED | OUTPATIENT
Start: 2017-07-20 | End: 2018-04-10

## 2017-07-20 ASSESSMENT — PATIENT HEALTH QUESTIONNAIRE - PHQ9: SUM OF ALL RESPONSES TO PHQ QUESTIONS 1-9: 2

## 2017-07-20 ASSESSMENT — ANXIETY QUESTIONNAIRES: GAD7 TOTAL SCORE: 2

## 2017-07-21 NOTE — PROGRESS NOTES
Dear Yoselyn,   Your test results are all back -   -Liver and gallbladder tests are normal. (ALT,AST, Alk phos, bilirubin), kidney function is normal (Cr, GFR), Sodium is normal, Potassium is normal, Calcium is normal, Glucose is normal (diabetes screening test).   Let us know if you have any questions.  -Elisabeth Powell, DO

## 2017-10-02 ENCOUNTER — ALLIED HEALTH/NURSE VISIT (OUTPATIENT)
Dept: NURSING | Facility: CLINIC | Age: 42
End: 2017-10-02
Payer: COMMERCIAL

## 2017-10-02 DIAGNOSIS — Z23 NEED FOR PROPHYLACTIC VACCINATION AND INOCULATION AGAINST INFLUENZA: Primary | ICD-10-CM

## 2017-10-02 PROCEDURE — 99207 ZZC NO CHARGE NURSE ONLY: CPT

## 2017-10-02 PROCEDURE — 90471 IMMUNIZATION ADMIN: CPT

## 2017-10-02 PROCEDURE — 90686 IIV4 VACC NO PRSV 0.5 ML IM: CPT

## 2017-10-02 NOTE — PROGRESS NOTES
Injectable Influenza Immunization Documentation    1.  Is the person to be vaccinated sick today?   No    2. Does the person to be vaccinated have an allergy to a component   of the vaccine?   No    3. Has the person to be vaccinated ever had a serious reaction   to influenza vaccine in the past?   No    4. Has the person to be vaccinated ever had Guillain-Barré syndrome?   No    Form completed by Vonda Sutherland

## 2017-10-02 NOTE — MR AVS SNAPSHOT
After Visit Summary   10/2/2017    Yoselyn Mcgill    MRN: [id number]           Patient Information     Date Of Birth          1975        Visit Information        Provider Department      10/2/2017 4:40 PM [facility]        Today's Diagnoses     Need for prophylactic vaccination and inoculation against influenza    -  1       Follow-ups after your visit        Who to contact     If you have questions or need follow up information about today's clinic visit or your schedule please contact [facility] directly at [phone].  Normal or non-critical lab and imaging results will be communicated to you by R-Evolution Industrieshart, letter or phone within 4 business days after the clinic has received the results. If you do not hear from us within 7 days, please contact the clinic through [employer] or phone. If you have a critical or abnormal lab result, we will notify you by phone as soon as possible.  Submit refill requests through Refinery29 or call your pharmacy and they will forward the refill request to us. Please allow 3 business days for your refill to be completed.          Additional Information About Your Visit        MyChart Information     Refinery29 gives you secure access to your electronic health record. If you see a primary care provider, you can also send messages to your care team and make appointments. If you have questions, please call your primary care clinic.  If you do not have a primary care provider, please call [phone] and they will assist you.        Care EveryWhere ID     This is your Care EveryWhere ID. This could be used by other organizations to access your [place] medical records  [id number]         Blood Pressure from Last 3 Encounters:   07/19/17 120/78   02/10/17 116/70   09/30/16 122/78    Weight from Last 3 Encounters:   07/19/17 144 lb (65.3 kg)   02/10/17 141 lb 3.2 oz (64 kg)   02/02/17 145 lb (65.8 kg)               We Performed the Following     FLU VAC, SPLIT VIRUS IM > 3 YO (QUADRIVALENT) [40227]     Vaccine Administration, Initial [64838]        Primary Care Provider Office Phone # Fax #    Elisabeth Powell [phone] [address] [address]        Equal Access to Services     [facility] JOB : Hadii aad ku hadasho Soomaali, waaxda luqadaha, qaybta kaalmada adeegyada, waxay idiin hayaan adeeg kharash laPerezaan . So [facility] [phone].    ATENCIÓN: Si habla español, tiene a matthew disposición servicios gratuitos de asistencia lingüística. Jhonny al [phone].    We comply with applicable federal civil rights laws and [place] laws. We do not discriminate on the basis of race, color, national origin, age, disability, sex, sexual orientation, or gender identity.            Thank you!     Thank you for choosing [facility]  for your care. Our goal is always to provide you with excellent care. Hearing back from our patients is one way we can continue to improve our services. Please take a few minutes to complete the written survey that you may receive in the mail after your visit with us. Thank you!             Your Updated Medication List - Protect others around you: Learn how to safely use, store and throw away your medicines at www.disposemymeds.org.          This list is accurate as of: 10/2/17  4:43 PM.  Always use your most recent med list.                   Brand Name Dispense Instructions for use Diagnosis    clindamycin 1 % lotion    CLINDAMAX    60 mL    Apply topically 2 times daily    Acne vulgaris       LORazepam 0.5 MG tablet    ATIVAN    20 tablet    Take 1 tablet (0.5 mg) by mouth every 8 hours as needed for anxiety    Generalized anxiety disorder       OMEGA-3 FISH OIL PO           PRENATAL VITAMINS PO           PRO-BIOTIC BLEND PO           sertraline 50 MG tablet    ZOLOFT    90 tablet    Take 1 tablet (50 mg) by mouth daily     Generalized anxiety disorder       traZODone 50 MG tablet    DESYREL    180 tablet    TAKE 2 TABLETS(100 MG) BY MOUTH AT BEDTIME    Other insomnia       VITAMIN D (CHOLECALCIFEROL) PO      Take by mouth daily

## 2017-10-10 ENCOUNTER — NURSE TRIAGE (OUTPATIENT)
Dept: NURSING | Facility: CLINIC | Age: 42
End: 2017-10-10

## 2017-10-10 NOTE — TELEPHONE ENCOUNTER
"Patient calling reporting symptoms started yesterday 10/9/17 with diarrhea. Reporting several loose stools yesterday. After taking Imodium yesterday afternoon, no further loose stools.  Today very sore throat. Temp 100 (O). Chills.  Denies other symptoms.     Additional Information    Negative: Severe difficulty breathing (e.g., struggling for each breath, speaks in single words, stridor)    Negative: Sounds like a life-threatening emergency to the triager    Negative: [1] Diagnosed strep throat AND [2] taking antibiotic AND [3] symptoms continue    Negative: Throat culture results, call about    Negative: Productive cough is main symptom    Negative: Non-productive cough is main symptom    Negative: Hoarseness is main symptom    Negative: Runny nose is main symptom    Negative: [1] Drooling or spitting out saliva (because can't swallow) AND [2] normal breathing    Negative: Unable to open mouth completely    Negative: [1] Difficulty breathing AND [2] not severe    Negative: Fever > 104 F (40 C)    Negative: [1] Refuses to drink anything AND [2] for > 12 hours    Negative: [1] Drinking very little AND [2] dehydration suspected (e.g., no urine > 12 hours, very dry mouth, very lightheaded)    Negative: Patient sounds very sick or weak to the triager    Negative: SEVERE (e.g., excruciating) throat pain    Negative: [1] Pus on tonsils (back of throat) AND [2]  fever AND [3] swollen neck lymph nodes (\"glands\")    Negative: [1] Rash AND [2] widespread (especially chest and abdomen)    Negative: Earache also present    Negative: Fever present > 3 days (72 hours)    Negative: Diabetes mellitus or weak immune system (e.g., HIV positive, cancer chemo, splenectomy, organ transplant)    Negative: History of rheumatic fever    Negative: [1] Adult is leaving on a trip AND [2] requests an antibiotic NOW    Negative: [1] Positive throat culture or rapid strep test (according to lab, PCP, caller, etc.) AND [2] NO  standing order to " call in prescription for antibiotic    Negative: [1] Exposure to family member (or spouse or boyfriend/girlfriend) with test-proven strep AND [2] within last 10 days    Negative: [1] Sore throat is the only symptom AND [2] present > 48 hours    Negative: [1] Sore throat with cough/cold symptoms AND [2] present > 5 days    [1] Sore throat is the only symptom AND [2] sore throat present < 48 hours  (all triage questions negative)    Protocols used: SORE THROAT-ADULT-

## 2017-10-16 ENCOUNTER — OFFICE VISIT (OUTPATIENT)
Dept: FAMILY MEDICINE | Facility: CLINIC | Age: 42
End: 2017-10-16
Payer: COMMERCIAL

## 2017-10-16 VITALS
HEART RATE: 80 BPM | HEIGHT: 63 IN | OXYGEN SATURATION: 98 % | RESPIRATION RATE: 14 BRPM | WEIGHT: 147.5 LBS | BODY MASS INDEX: 26.13 KG/M2 | DIASTOLIC BLOOD PRESSURE: 68 MMHG | TEMPERATURE: 98.7 F | SYSTOLIC BLOOD PRESSURE: 114 MMHG

## 2017-10-16 DIAGNOSIS — J01.90 ACUTE SINUSITIS WITH SYMPTOMS > 10 DAYS: Primary | ICD-10-CM

## 2017-10-16 PROCEDURE — 99213 OFFICE O/P EST LOW 20 MIN: CPT | Performed by: PHYSICIAN ASSISTANT

## 2017-10-16 RX ORDER — AZITHROMYCIN 250 MG/1
TABLET, FILM COATED ORAL
Qty: 6 TABLET | Refills: 0 | Status: SHIPPED | OUTPATIENT
Start: 2017-10-16 | End: 2018-02-26

## 2017-10-16 NOTE — NURSING NOTE
"Chief Complaint   Patient presents with     URI       Initial /68  Pulse 80  Temp 98.7  F (37.1  C) (Oral)  Resp 14  Ht 5' 3\" (1.6 m)  Wt 147 lb 8 oz (66.9 kg)  SpO2 98%  Breastfeeding? No  BMI 26.13 kg/m2 Estimated body mass index is 26.13 kg/(m^2) as calculated from the following:    Height as of this encounter: 5' 3\" (1.6 m).    Weight as of this encounter: 147 lb 8 oz (66.9 kg).  BP completed using cuff size: regular    Health Maintenance that is potentially due pending provider review:  Health Maintenance Due   Topic Date Due     DEPRESSION ACTION PLAN Q1 YR  03/19/2014         ordered  "

## 2017-10-16 NOTE — MR AVS SNAPSHOT
"              After Visit Summary   10/16/2017    Yoselyn Mcgill    MRN: [id number]           Patient Information     Date Of Birth          1975        Visit Information        Provider Department      10/16/2017 2:40 PM Jama Costa PA-C [facility]        Today's Diagnoses     Acute sinusitis with symptoms > 10 days    -  1       Follow-ups after your visit        Who to contact     If you have questions or need follow up information about today's clinic visit or your schedule please contact [facility] directly at [phone].  Normal or non-critical lab and imaging results will be communicated to you by Nuvotronicshart, letter or phone within 4 business days after the clinic has received the results. If you do not hear from us within 7 days, please contact the clinic through Nuvotronicshart or phone. If you have a critical or abnormal lab result, we will notify you by phone as soon as possible.  Submit refill requests through Keen Impressions or call your pharmacy and they will forward the refill request to us. Please allow 3 business days for your refill to be completed.          Additional Information About Your Visit        MyChart Information     [employer] gives you secure access to your electronic health record. If you see a primary care provider, you can also send messages to your care team and make appointments. If you have questions, please call your primary care clinic.  If you do not have a primary care provider, please call [phone] and they will assist you.        Care EveryWhere ID     This is your Care EveryWhere ID. This could be used by other organizations to access your [place] medical records  [id number]        Your Vitals Were     Pulse Temperature Respirations Height Pulse Oximetry Breastfeeding?    80 98.7  F (37.1  C) (Oral) 14 5' 3\" (1.6 m) 98% No    BMI (Body Mass Index)                   26.13 kg/m2            Blood Pressure from Last 3 Encounters:   10/16/17 " 114/68   07/19/17 120/78   02/10/17 116/70    Weight from Last 3 Encounters:   10/16/17 147 lb 8 oz (66.9 kg)   07/19/17 144 lb (65.3 kg)   02/10/17 141 lb 3.2 oz (64 kg)              Today, you had the following     No orders found for display         Today's Medication Changes          These changes are accurate as of: 10/16/17  3:04 PM.  If you have any questions, ask your nurse or doctor.               Start taking these medicines.        Dose/Directions    azithromycin 250 MG tablet   Commonly known as:  ZITHROMAX   Used for:  Acute sinusitis with symptoms > 10 days   Started by:  Jama Costa PA-C        Two tablets first day, then one tablet daily for four days.   Quantity:  6 tablet   Refills:  0            Where to get your medicines      These medications were sent to [facility] [address] [address] AT [address] [address]     Phone:  [phone]     azithromycin 250 MG tablet                Primary Care Provider Office Phone # Fax #    Elisabeth CARPENTER DO Andre [phone] [address] [address]        Equal Access to Services     [facility] AH: Hadii maida [place] hadasho Soomaali, waaxda luqadaha, qaybta kaalmada adeegyada, sophie rhodesin mihaela bartlett. So [facility] [phone].    ATENCIÓN: Si habla español, tiene a matthew disposición servicios gratuitos de asistencia lingüística. Llame al [phone].    We comply with applicable federal civil rights laws and [place] laws. We do not discriminate on the basis of race, color, national origin, age, disability, sex, sexual orientation, or gender identity.            Thank you!     Thank you for choosing [facility]  for your care. Our goal is always to provide you with excellent care. Hearing back from our patients is one way we can continue to improve our services. Please take a few minutes to complete the written survey that  you may receive in the mail after your visit with us. Thank you!             Your Updated Medication List - Protect others around you: Learn how to safely use, store and throw away your medicines at www.disposemymeds.org.          This list is accurate as of: 10/16/17  3:04 PM.  Always use your most recent med list.                   Brand Name Dispense Instructions for use Diagnosis    azithromycin 250 MG tablet    ZITHROMAX    6 tablet    Two tablets first day, then one tablet daily for four days.    Acute sinusitis with symptoms > 10 days       clindamycin 1 % lotion    CLINDAMAX    60 mL    Apply topically 2 times daily    Acne vulgaris       LORazepam 0.5 MG tablet    ATIVAN    20 tablet    Take 1 tablet (0.5 mg) by mouth every 8 hours as needed for anxiety    Generalized anxiety disorder       OMEGA-3 FISH OIL PO           PRENATAL VITAMINS PO           PRO-BIOTIC BLEND PO           sertraline 50 MG tablet    ZOLOFT    90 tablet    Take 1 tablet (50 mg) by mouth daily    Generalized anxiety disorder       traZODone 50 MG tablet    DESYREL    180 tablet    TAKE 2 TABLETS(100 MG) BY MOUTH AT BEDTIME    Other insomnia       VITAMIN D (CHOLECALCIFEROL) PO      Take by mouth daily

## 2017-10-16 NOTE — PROGRESS NOTES
"  SUBJECTIVE:   Yoselyn Mcgill is a 42 year old female who presents to clinic today for the following health issues:      RESPIRATORY SYMPTOMS      Duration: > 1 week    Description  nasal congestion, rhinorrhea, sore throat, facial pain/pressure, cough, fever x 8 days to to 102.0 last we ek, chills, headache, fatigue/malaise, hoarse voice, myalgias and diarrhea 8 days ago at start of illness    Severity: mild to moderate    Accompanying signs and symptoms: chest tightness sometimes-    History (predisposing factors):  Asthma when growing up    Precipitating or alleviating factors: None    Therapies tried and outcome:  rest and fluids oral decongestant antihistamine acetaminophen OTC NSAID Tea , soups, emergent-C, zinc, extra vitamin C            Problem list and histories reviewed & adjusted, as indicated.  Additional history: 43 y/o female c/o not feeling well for the last 10+  days.  Admits to the above symptoms with sinus congestion and sinus pain being the worst. Has been going through family.    BP Readings from Last 3 Encounters:   10/16/17 114/68   07/19/17 120/78   02/10/17 116/70    Wt Readings from Last 3 Encounters:   10/16/17 147 lb 8 oz (66.9 kg)   07/19/17 144 lb (65.3 kg)   02/10/17 141 lb 3.2 oz (64 kg)                      Reviewed and updated as needed this visit by clinical staffTobacco  Allergies  Meds  Med Hx  Surg Hx  Fam Hx  Soc Hx      Reviewed and updated as needed this visit by Provider         ROS:  Constitutional, HEENT, cardiovascular, pulmonary, gi and gu systems are negative, except as otherwise noted.      OBJECTIVE:   /68  Pulse 80  Temp 98.7  F (37.1  C) (Oral)  Resp 14  Ht 5' 3\" (1.6 m)  Wt 147 lb 8 oz (66.9 kg)  SpO2 98%  Breastfeeding? No  BMI 26.13 kg/m2  Body mass index is 26.13 kg/(m^2).  GENERAL: alert and no distress  EYES: Eyes grossly normal to inspection  HENT: b/l TM dullness with loss of light reflux nasal mucosa is erythematous and edematous "   RESP: lungs clear to auscultation - no rales, rhonchi or wheezes  CV: regular rate and rhythm, normal S1 S2, no S3 or S4, no murmur, click or rub, no peripheral edema and peripheral pulses strong  PSYCH: mentation appears normal, affect normal/bright    Diagnostic Test Results:  none     ASSESSMENT/PLAN:             1. Acute sinusitis with symptoms > 10 days  OTC probiotics while on antibiotic to help limit some potential side effects.   - azithromycin (ZITHROMAX) 250 MG tablet; Two tablets first day, then one tablet daily for four days.  Dispense: 6 tablet; Refill: 0    Follow up if symptoms persist or worsen     Jama Costa PA-C  [facility]

## 2017-10-19 ENCOUNTER — TELEPHONE (OUTPATIENT)
Dept: FAMILY MEDICINE | Facility: CLINIC | Age: 42
End: 2017-10-19

## 2017-10-19 NOTE — TELEPHONE ENCOUNTER
Reason for call:  Patient reporting a symptom    Symptom or request: sinus infection    Duration (how long have symptoms been present): 2 weeks    Have you been treated for this before? Yes    Additional comments: was prescribed azithromycin (ZITHROMAX) 250 MG tablet  And started taking it on Monday and is feeling a little better but not much.  Fever  Is gone still feeling aches and pains and headaches    Phone Number patient can be reached at:  Home number on file [phone] (home)    Best Time:  anytime    Can we leave a detailed message on this number:  YES    Call taken on 10/19/2017 at 10:58 AM by Debi Rush

## 2017-10-20 NOTE — TELEPHONE ENCOUNTER
Spoke with patient.  States she is feeling little better but not much.  Low grade fever 99 and achy/headache.  Took last pill Azithromycin today.  Informed abx will stay in her system for another 5 days.  Push fluids, Ibuprofen/Tylenol for aches/HA.  Nasal spray. Rest.    If not feeling better by Monday or Tuesday of next week call clinic.  Patient verbalizes understanding.  Elizabeth Eldre RN

## 2017-10-20 NOTE — TELEPHONE ENCOUNTER
Reason for Call:  Retuning Call     Detailed comments: Yoselyn Mcgill is calling to speak with someone about her symptoms.     Phone Number Patient can be reached at: Home number on file [phone] (home)    Best Time: ASAP     Can we leave a detailed message on this number? YES    Call taken on 10/20/2017 at 12:51 PM by Sissy Greene

## 2017-11-16 ENCOUNTER — TELEPHONE (OUTPATIENT)
Dept: FAMILY MEDICINE | Facility: CLINIC | Age: 42
End: 2017-11-16

## 2017-11-16 NOTE — TELEPHONE ENCOUNTER
Reason for Call:  Other questions    Detailed comments: patient has questions regarding vitamin D.  She feels very fatigued and thinks its due to the lack of sun.  Asking what dosage would be recommended, or if there are any other recommendations in addition to Vitamin D.     Phone Number Patient can be reached at: Home number on file [phone] (home)    Best Time: any    Can we leave a detailed message on this number? YES  PLEASE LEAVE DETAILED MESSAGE if she cannot answer    Call taken on 11/16/2017 at 12:59 PM by Dayana Rodriguez

## 2017-12-30 ENCOUNTER — NURSE TRIAGE (OUTPATIENT)
Dept: NURSING | Facility: CLINIC | Age: 42
End: 2017-12-30

## 2017-12-30 DIAGNOSIS — F41.1 GENERALIZED ANXIETY DISORDER: ICD-10-CM

## 2017-12-30 NOTE — TELEPHONE ENCOUNTER
Returned patient call. Explained this medication needs provider approval. Offered to send message to clinic for Tuesday, patient appreciative of that.

## 2017-12-30 NOTE — TELEPHONE ENCOUNTER
----- Message from Brandin De Los Santos sent at 12/30/2017  4:35 PM CST -----  Reason for call:  Medication   If this is a refill request, has the caller requested the refill from the pharmacy already? Yes  Will the patient be using a [facility]? No  Name of the pharmacy and phone number for the current request: [facility] [phone]    Name of the medication requested: Lorazepam     Other request: No.    Phone number to reach patient:  Home number on file [phone] (home)    Best Time:  Anytime.    Can we leave a detailed message on this number?  YES

## 2017-12-30 NOTE — TELEPHONE ENCOUNTER
Patient requesting refill, ok to leave detailed message.    If this is a refill request, has the caller requested the refill from the pharmacy already? Yes  Will the patient be using a [facility]? No  Name of the pharmacy and phone number for the current request: [facility] [phone]     Name of the medication requested: Lorazepam      Other request: No.     Phone number to reach patient:  Home number on file [phone] (home)     Best Time:  Anytime.     Can we leave a detailed message on this number?  YES

## 2018-01-02 RX ORDER — LORAZEPAM 0.5 MG/1
0.5 TABLET ORAL EVERY 8 HOURS PRN
Qty: 20 TABLET | Refills: 0 | Status: SHIPPED | OUTPATIENT
Start: 2018-01-02 | End: 2018-08-07

## 2018-01-02 NOTE — TELEPHONE ENCOUNTER
PN,    Controlled Substance Refill Request for Ativan    Last refill: 2/10/2017 #20    Last clinic visit: 7/19/2017     Clinic visit frequency required: not documented  Next appt: none    Controlled substance agreement on file: No.    Documentation in problem list reviewed:  anxiety is resolved on problem list    Processing:  Fax Rx to [facility]    [place] reviewed:  reviewed- no concerns  [place]  [email or web address]    Please authorize if appropriate.  Thanks,  Isabella PANCHAL RN

## 2018-01-07 DIAGNOSIS — F41.1 GENERALIZED ANXIETY DISORDER: ICD-10-CM

## 2018-01-08 NOTE — TELEPHONE ENCOUNTER
"Prescription approved per [facility] Refill Protocol.  Takes for anxiety.  Elizabeth Elder RN  Requested Prescriptions   Signed Prescriptions Disp Refills     sertraline (ZOLOFT) 50 MG tablet 90 tablet 1     Sig: TAKE 1 TABLET(50 MG) BY MOUTH DAILY    SSRIs Protocol Passed    1/7/2018  8:52 AM       Passed - PHQ-9 score less than 5 in past 6 months    The PHQ-9 criteria is meant to fail. It requires a PHQ-9 score review         Passed - Recent or future visit with authorizing provider    Patient had office visit in the last year or has a visit in the next 30 days with authorizing provider.  See \"Choose Columns\" in Meds & Orders section of the refill encounter.              Passed - Patient is age 18 or older       Passed - No active pregnancy on record       Passed - No positive pregnancy test in last 12 months       Passed - Recent (6 mo) or future visit with authorizing provider's specialty    Patient had office visit in the last 6 months or has a visit in the next 30 days with authorizing provider.  See \"Choose Columns\" in Meds & Orders section of the refill encounter.             "

## 2018-01-19 ENCOUNTER — TRANSFERRED RECORDS (OUTPATIENT)
Dept: HEALTH INFORMATION MANAGEMENT | Facility: CLINIC | Age: 43
End: 2018-01-19

## 2018-02-01 ENCOUNTER — MYC MEDICAL ADVICE (OUTPATIENT)
Dept: FAMILY MEDICINE | Facility: CLINIC | Age: 43
End: 2018-02-01

## 2018-02-08 ENCOUNTER — MYC MEDICAL ADVICE (OUTPATIENT)
Dept: FAMILY MEDICINE | Facility: CLINIC | Age: 43
End: 2018-02-08

## 2018-02-09 NOTE — TELEPHONE ENCOUNTER
LH,  Please see below [facility] [employer] message  Last travel visit 2/2/2017 - also for travel to [place]  Would you be willing to do phone visit?  Isabella PANCHAL RN

## 2018-02-12 NOTE — TELEPHONE ENCOUNTER
Tried to call pt to schedule phone appt - no answer, left VM for her to callback  She did read MyChart from Lizett PANCHAL RN

## 2018-02-26 ENCOUNTER — TELEPHONE (OUTPATIENT)
Dept: FAMILY MEDICINE | Facility: CLINIC | Age: 43
End: 2018-02-26

## 2018-02-26 ENCOUNTER — OFFICE VISIT (OUTPATIENT)
Dept: FAMILY MEDICINE | Facility: CLINIC | Age: 43
End: 2018-02-26
Payer: COMMERCIAL

## 2018-02-26 VITALS
SYSTOLIC BLOOD PRESSURE: 120 MMHG | RESPIRATION RATE: 16 BRPM | TEMPERATURE: 98.2 F | DIASTOLIC BLOOD PRESSURE: 81 MMHG | HEART RATE: 68 BPM | OXYGEN SATURATION: 99 %

## 2018-02-26 DIAGNOSIS — R55 SYNCOPE, UNSPECIFIED SYNCOPE TYPE: Primary | ICD-10-CM

## 2018-02-26 DIAGNOSIS — E83.110 HEREDITARY HEMOCHROMATOSIS (H): ICD-10-CM

## 2018-02-26 LAB
BASOPHILS # BLD AUTO: 0 10E9/L (ref 0–0.2)
BASOPHILS NFR BLD AUTO: 0.1 %
DIFFERENTIAL METHOD BLD: NORMAL
EOSINOPHIL # BLD AUTO: 0.1 10E9/L (ref 0–0.7)
EOSINOPHIL NFR BLD AUTO: 1.4 %
ERYTHROCYTE [DISTWIDTH] IN BLOOD BY AUTOMATED COUNT: 12.1 % (ref 10–15)
HCT VFR BLD AUTO: 39.7 % (ref 35–47)
HGB BLD-MCNC: 13.2 G/DL (ref 11.7–15.7)
LYMPHOCYTES # BLD AUTO: 2.5 10E9/L (ref 0.8–5.3)
LYMPHOCYTES NFR BLD AUTO: 35.2 %
MCH RBC QN AUTO: 30.8 PG (ref 26.5–33)
MCHC RBC AUTO-ENTMCNC: 33.2 G/DL (ref 31.5–36.5)
MCV RBC AUTO: 93 FL (ref 78–100)
MONOCYTES # BLD AUTO: 0.6 10E9/L (ref 0–1.3)
MONOCYTES NFR BLD AUTO: 8.4 %
NEUTROPHILS # BLD AUTO: 4 10E9/L (ref 1.6–8.3)
NEUTROPHILS NFR BLD AUTO: 54.9 %
PLATELET # BLD AUTO: 276 10E9/L (ref 150–450)
RBC # BLD AUTO: 4.28 10E12/L (ref 3.8–5.2)
WBC # BLD AUTO: 7.2 10E9/L (ref 4–11)

## 2018-02-26 PROCEDURE — 36415 COLL VENOUS BLD VENIPUNCTURE: CPT | Performed by: PHYSICIAN ASSISTANT

## 2018-02-26 PROCEDURE — 99213 OFFICE O/P EST LOW 20 MIN: CPT | Performed by: PHYSICIAN ASSISTANT

## 2018-02-26 PROCEDURE — 93000 ELECTROCARDIOGRAM COMPLETE: CPT | Performed by: PHYSICIAN ASSISTANT

## 2018-02-26 PROCEDURE — 80050 GENERAL HEALTH PANEL: CPT | Performed by: PHYSICIAN ASSISTANT

## 2018-02-26 NOTE — MR AVS SNAPSHOT
After Visit Summary   2/26/2018    Yoselyn Mcgill    MRN: [id number]           Patient Information     Date Of Birth          1975        Visit Information        Provider Department      2/26/2018 2:40 PM Jama Costa PA-C [facility]        Today's Diagnoses     Syncope, unspecified syncope type    -  1    Hereditary hemochromatosis (H)           Follow-ups after your visit        Follow-up notes from your care team     Return if symptoms worsen or fail to improve.      Who to contact     If you have questions or need follow up information about today's clinic visit or your schedule please contact [facility] [place] [facility] directly at [phone].  Normal or non-critical lab and imaging results will be communicated to you by MyChart, letter or phone within 4 business days after the clinic has received the results. If you do not hear from us within 7 days, please contact the clinic through [facility] or phone. If you have a critical or abnormal lab result, we will notify you by phone as soon as possible.  Submit refill requests through [employer] or call your pharmacy and they will forward the refill request to us. Please allow 3 business days for your refill to be completed.          Additional Information About Your Visit        MyChart Information     [employer] gives you secure access to your electronic health record. If you see a primary care provider, you can also send messages to your care team and make appointments. If you have questions, please call your primary care clinic.  If you do not have a primary care provider, please call [phone] and they will assist you.        Care EveryWhere ID     This is your Care EveryWhere ID. This could be used by other organizations to access your [facility] medical records  [id number]        Your Vitals Were     Pulse Temperature Respirations Last Period Pulse Oximetry       68 98.2  F (36.8  C) (Oral) 16 02/23/2018 99%        Blood  Pressure from Last 3 Encounters:   02/26/18 120/81   10/16/17 114/68   07/19/17 120/78    Weight from Last 3 Encounters:   10/16/17 147 lb 8 oz (66.9 kg)   07/19/17 144 lb (65.3 kg)   02/10/17 141 lb 3.2 oz (64 kg)              We Performed the Following     CBC with platelets differential     Comprehensive metabolic panel     EKG 12-lead complete w/read - Clinics     TSH with free T4 reflex          Today's Medication Changes          These changes are accurate as of 2/26/18 11:59 PM.  If you have any questions, ask your nurse or doctor.               Stop taking these medicines if you haven't already. Please contact your care team if you have questions.     azithromycin 250 MG tablet   Commonly known as:  ZITHROMAX   Stopped by:  Jama Costa PA-C                    Primary Care Provider Office Phone # Fax #    Elisabeth PAULINE Powell [phone] [address] [address]        Equal Access to Services     [facility] Center[facility] : Hadii aad ku hadasho Soomaali, waaxda luqadaha, qaybta kaalmada adeegyada, waxay idiin hayelijahn ronny hartman . So [facility] [phone].    ATENCIÓN: Si habla español, tiene a matthew disposición servicios gratuitos de asistencia lingüística. LlSelect Medical [facility] [phone].    We comply with applicable federal civil rights laws and [place] laws. We do not discriminate on the basis of race, color, national origin, age, disability, sex, sexual orientation, or gender identity.            Thank you!     Thank you for choosing [facility]  for your care. Our goal is always to provide you with excellent care. Hearing back from our patients is one way we can continue to improve our services. Please take a few minutes to complete the written survey that you may receive in the mail after your visit with us. Thank you!             Your Updated Medication List - Protect others around you: Learn how to safely use, store and throw away your medicines at  www.disposemymeds.org.          This list is accurate as of 2/26/18 11:59 PM.  Always use your most recent med list.                   Brand Name Dispense Instructions for use Diagnosis    clindamycin 1 % lotion    CLINDAMAX    60 mL    Apply topically 2 times daily    Acne vulgaris       LORazepam 0.5 MG tablet    ATIVAN    20 tablet    Take 1 tablet (0.5 mg) by mouth every 8 hours as needed for anxiety    Generalized anxiety disorder       OMEGA-3 FISH OIL PO           PRENATAL VITAMINS PO           PRO-BIOTIC BLEND PO           sertraline 50 MG tablet    ZOLOFT    90 tablet    TAKE 1 TABLET(50 MG) BY MOUTH DAILY    Generalized anxiety disorder       traZODone 50 MG tablet    DESYREL    180 tablet    TAKE 2 TABLETS(100 MG) BY MOUTH AT BEDTIME    Other insomnia       VITAMIN D (CHOLECALCIFEROL) PO      Take by mouth daily

## 2018-02-26 NOTE — TELEPHONE ENCOUNTER
Patient called back   Has been resting and took headache medication   Not improved  Would like to be seen today    Next 5 appointments (look out 90 days)     Feb 26, 2018  2:40 PM CST   Office Visit with Jama Costa PA-C   [facility]    [address]  [facility] [address]   [phone]                Isabella PANCHAL RN

## 2018-02-26 NOTE — PROGRESS NOTES
SUBJECTIVE:   Yoselyn Mcgill is a 42 year old female who presents to clinic today for the following health issues:      Fall       Duration: last night     Description (location/character/radiation): patient states that she passed out and fall     Intensity:  moderate    Accompanying signs and symptoms: tired and nausea / patient had cycle 5 days early this month first day was on Friday / has headache has taken 2 exedrine helps some     History (similar episodes/previous evaluation): None    Precipitating or alleviating factors: None    Therapies tried and outcome: None           Problem list and histories reviewed & adjusted, as indicated.  Additional history: she was taking dog out early in the am, and when she came back, she felt acutely nauseated.  She went to bathroom, and her color was off.  She made it back up to bed, and she had a feeling like she was going to pass out, and did.  Next thing she knew, she was on floor.  She thinks she was out very brief, and woke up being clammy.  She made it back to bed, and had unrestful sleep.      In the am, she did take xanax to help her anxiety.  She is feeling overall better, but still rather uneasy/anxious about what the cause may have been.      She denies any chest pains, palpitations, SOB.  She has never really had much history of passing out.  Blood pressure has not been an issue.  She has a very normal meal in the evening.  Feels she has been well hydrated.        BP Readings from Last 3 Encounters:   02/26/18 120/81   10/16/17 114/68   07/19/17 120/78    Wt Readings from Last 3 Encounters:   10/16/17 147 lb 8 oz (66.9 kg)   07/19/17 144 lb (65.3 kg)   02/10/17 141 lb 3.2 oz (64 kg)                    Reviewed and updated as needed this visit by clinical staff       Reviewed and updated as needed this visit by Provider         ROS:  Constitutional, HEENT, cardiovascular, pulmonary, gi and gu systems are negative, except as otherwise noted.    OBJECTIVE:      /81  Pulse 68  Temp 98.2  F (36.8  C) (Oral)  Resp 16  LMP 02/23/2018  SpO2 99%  There is no height or weight on file to calculate BMI.  GENERAL: alert and no distress  EYES: Eyes grossly normal to inspection, PERRL and EOMI  HENT: ear canals and TM's normal, nose and mouth without ulcers or lesions  NECK: no adenopathy, no asymmetry, masses, or scars and no carotid bruits  RESP: lungs clear to auscultation - no rales, rhonchi or wheezes  CV: regular rate and rhythm, normal S1 S2, no S3 or S4, no murmur, click or rub, no peripheral edema and peripheral pulses strong  NEURO: Normal strength and tone, mentation intact and speech normal  PSYCH: mentation appears normal, affect normal/bright    Diagnostic Test Results:  EKG - appears normal, NSR, normal axis, normal intervals, no acute ST/T changes c/w ischemia, no LVH by voltage criteria    ASSESSMENT/PLAN:             1. Syncope, unspecified syncope type  She is asymptomatic at this time.  EKG is reassuring and she is not having any cardiac symptoms.  Will screen with some lab work.  This does not sound like a seizure.  May be simply vasovagal, but will check in after labs are back to see how she is feeling.  If symptoms persist or worsen, will encourage patient to follow up with PCP for further evaluation.    - EKG 12-lead complete w/read - Clinics  - CBC with platelets differential  - Comprehensive metabolic panel  - TSH with free T4 reflex    2. Hereditary hemochromatosis (H)  She has been anemic in the past.            Jama Costa PA-C  [facility]  [facility]

## 2018-02-26 NOTE — TELEPHONE ENCOUNTER
"PN,  Patient states at 2am she had to take dog outside last night  While going downstairs became very nauseous - never vomited  Went to bathroom and face looked yellow  Became shaky   Was able to make it back upstairs    Had a feeling of being \"pushed down\"  Ended up falling by bedroom door   helped her back to bed    Was cold and shaky  Was able to sleep intermittently  Has a headache right now and soreness to body  Has some oatmeal a few minutes ago and was able to keep that down  States headaches are normal for her - this one is more intense than previous ones  Headache to back of head and right side  Having light sensitivity    Arm and leg on (R) side fully functional  States period 5 days early, but nothing else abnormal as far as her health  Had a good supper last evening - doesn't think symptoms d/t blood sugar  Took a dose of Ativan this AM because anxious about what had happened.  Patient home resting today  Recently came back from [place] 2/18/2018 also - was feeling fine until last night    Advised she continue to monitor for today, rest, and push fluids.  She will take Excedrin for headache also  Pt will callback if worsening - advised you are not in office today  Ultimately advised appt for assessment  She wants to see you though  Ok for 15 minute spot?    Isabella PANCHAL RN    "

## 2018-02-26 NOTE — TELEPHONE ENCOUNTER
Reason for Call:  Other call back    Detailed comments: Pt called this morning and would like Dr. Powell or one of her nurse's to give her a call in regards to a migraine that she has had. Last night she felt almost like she fainted and nausea cam over her- almost like pressure. Once she was to her door, she fell. Pt does NOT know what it is--pt did NOT vomit. She would like to speak with a nurse in regards to this. Please give her a call back  ASAP.    Phone Number Patient can be reached at: Home number on file [phone] (home)    Best Time:     Can we leave a detailed message on this number? YES    Call taken on 2/26/2018 at 10:30 AM by Lucero Simmons

## 2018-02-27 LAB
ALBUMIN SERPL-MCNC: 3.9 G/DL (ref 3.4–5)
ALP SERPL-CCNC: 50 U/L (ref 40–150)
ALT SERPL W P-5'-P-CCNC: 15 U/L (ref 0–50)
ANION GAP SERPL CALCULATED.3IONS-SCNC: 9 MMOL/L (ref 3–14)
AST SERPL W P-5'-P-CCNC: 22 U/L (ref 0–45)
BILIRUB SERPL-MCNC: 0.2 MG/DL (ref 0.2–1.3)
BUN SERPL-MCNC: 10 MG/DL (ref 7–30)
CALCIUM SERPL-MCNC: 8.9 MG/DL (ref 8.5–10.1)
CHLORIDE SERPL-SCNC: 105 MMOL/L (ref 94–109)
CO2 SERPL-SCNC: 24 MMOL/L (ref 20–32)
CREAT SERPL-MCNC: 0.79 MG/DL (ref 0.52–1.04)
GFR SERPL CREATININE-BSD FRML MDRD: 80 ML/MIN/1.7M2
GLUCOSE SERPL-MCNC: 85 MG/DL (ref 70–99)
POTASSIUM SERPL-SCNC: 4 MMOL/L (ref 3.4–5.3)
PROT SERPL-MCNC: 7.8 G/DL (ref 6.8–8.8)
SODIUM SERPL-SCNC: 138 MMOL/L (ref 133–144)
TSH SERPL DL<=0.005 MIU/L-ACNC: 1.48 MU/L (ref 0.4–4)

## 2018-02-27 NOTE — PROGRESS NOTES
Dear Yoselyn    Your test results are attached, feel free to contact me via [employer]     Everything is looking good on your labs.  No sign of anemia, infection, liver/kidney problems, or thyroid issues.  How are you feeling today?    Rodriguez Costa PA-C

## 2018-04-10 DIAGNOSIS — G47.09 OTHER INSOMNIA: ICD-10-CM

## 2018-04-10 NOTE — TELEPHONE ENCOUNTER
Reason for Call:  Medication or medication refill:    Do you use a [facility]?  Name of the pharmacy and phone number for the current request:     [id number] - [place], [place] - [address]    Pt is completely out after tonight. Pt states pharmacy told her they sent a request 6 days ago. No request found in chart    Name of the medication requested: traZODone (DESYREL) 50 MG tablet    Can we leave a detailed message on this number? YES    Phone number patient can be reached at: Home number on file [phone] (home)    Best Time: Anytime    Call taken on 4/10/2018 at 6:54 PM by Devora Rodriguez

## 2018-04-11 RX ORDER — TRAZODONE HYDROCHLORIDE 50 MG/1
TABLET, FILM COATED ORAL
Qty: 180 TABLET | Refills: 0 | Status: SHIPPED | OUTPATIENT
Start: 2018-04-11 | End: 2018-07-02

## 2018-04-11 NOTE — TELEPHONE ENCOUNTER
"Prescription approved per [facility] Refill Protocol.  Left VM informing pt Rx sent  Isabella PANCHAL RN    Requested Prescriptions   Pending Prescriptions Disp Refills     traZODone (DESYREL) 50 MG tablet 180 tablet 1    Serotonin Modulators Passed    4/10/2018  6:58 PM       Passed - Recent (12 mo) or future (30 days) visit within the authorizing provider's specialty    Patient had office visit in the last 12 months or has a visit in the next 30 days with authorizing provider or within the authorizing provider's specialty.  See \"Patient Info\" tab in inbasket, or \"Choose Columns\" in Meds & Orders section of the refill encounter.           Passed - Patient is age 18 or older       Passed - No active pregnancy on record       Passed - No positive pregnancy test in past 12 months            "

## 2018-07-02 DIAGNOSIS — G47.09 OTHER INSOMNIA: ICD-10-CM

## 2018-07-03 DIAGNOSIS — G47.09 OTHER INSOMNIA: ICD-10-CM

## 2018-07-03 RX ORDER — TRAZODONE HYDROCHLORIDE 50 MG/1
TABLET, FILM COATED ORAL
Qty: 60 TABLET | Refills: 0 | Status: SHIPPED | OUTPATIENT
Start: 2018-07-03 | End: 2018-08-07

## 2018-07-03 RX ORDER — TRAZODONE HYDROCHLORIDE 50 MG/1
TABLET, FILM COATED ORAL
Start: 2018-07-03

## 2018-07-03 NOTE — TELEPHONE ENCOUNTER
"30 day sent this AM  Pharmacy requesting 90 day  Denied   90 day not appropriate; due for OV with provider this month  Isabella PANCHAL RN    Requested Prescriptions   Refused Prescriptions Disp Refills     traZODone (DESYREL) 50 MG tablet [Pharmacy Med Name: TRAZODONE 50MG TABLETS]       Sig: TAKE 2 TABLETS BY MOUTH AT BEDTIME    Serotonin Modulators Passed    7/3/2018 12:09 PM       Passed - Recent (12 mo) or future (30 days) visit within the authorizing provider's specialty    Patient had office visit in the last 12 months or has a visit in the next 30 days with authorizing provider or within the authorizing provider's specialty.  See \"Patient Info\" tab in inbasket, or \"Choose Columns\" in Meds & Orders section of the refill encounter.           Passed - Patient is age 18 or older       Passed - No active pregnancy on record       Passed - No positive pregnancy test in past 12 months          "

## 2018-07-03 NOTE — TELEPHONE ENCOUNTER
"Medication is being filled for 1 time refill only due to:  Patient needs to be seen because due for appt with PCP this month.   Isabella PANCHAL RN    Requested Prescriptions   Pending Prescriptions Disp Refills     traZODone (DESYREL) 50 MG tablet [Pharmacy Med Name: TRAZODONE 50MG TABLETS] 180 tablet 0     Sig: TAKE 2 TABLETS(100 MG) BY MOUTH AT BEDTIME    Serotonin Modulators Passed    7/2/2018  9:46 PM       Passed - Recent (12 mo) or future (30 days) visit within the authorizing provider's specialty    Patient had office visit in the last 12 months or has a visit in the next 30 days with authorizing provider or within the authorizing provider's specialty.  See \"Patient Info\" tab in inbasket, or \"Choose Columns\" in Meds & Orders section of the refill encounter.           Passed - Patient is age 18 or older       Passed - No active pregnancy on record       Passed - No positive pregnancy test in past 12 months              "

## 2018-07-05 ENCOUNTER — TELEPHONE (OUTPATIENT)
Dept: FAMILY MEDICINE | Facility: CLINIC | Age: 43
End: 2018-07-05

## 2018-07-05 NOTE — TELEPHONE ENCOUNTER
Shocking this morning, light day bled all the way through clothes, light headed, nauseated. No other unusual symptoms noted. Unusual for pt. Period started Tuesday, about 28 days -on time, some breast tenderness.  Pt doesn't think she might have been pregnant. Seems like flow has subsided since this morning.     Advised to take in plenty of fluids and rest. Monitor, if symptoms continues call back tomorrow and we will get her in with provider. Does have history of low iron, has noted with last two periods very fatigued. Advised iron supplement and increase in high iron foods. Has Px with PCP in August    Pt agrees with plan

## 2018-07-05 NOTE — TELEPHONE ENCOUNTER
Reason for call:  Patient reporting a symptom    Symptom or request: heavy flow period, nauseous, lightheaded    Duration (how long have symptoms been present): since this morning    Have you been treated for this before? No    Additional comments: patient says shes used to very light periods     Phone Number patient can be reached at:  Home number on file [phone] (home)    Best Time:  any    Can we leave a detailed message on this number:  YES    Call taken on 7/5/2018 at 12:17 PM by Sailaja Brooks

## 2018-08-07 ENCOUNTER — OFFICE VISIT (OUTPATIENT)
Dept: FAMILY MEDICINE | Facility: CLINIC | Age: 43
End: 2018-08-07
Payer: COMMERCIAL

## 2018-08-07 ENCOUNTER — TELEPHONE (OUTPATIENT)
Dept: FAMILY MEDICINE | Facility: CLINIC | Age: 43
End: 2018-08-07

## 2018-08-07 VITALS
HEART RATE: 58 BPM | BODY MASS INDEX: 25.66 KG/M2 | DIASTOLIC BLOOD PRESSURE: 68 MMHG | TEMPERATURE: 98.3 F | SYSTOLIC BLOOD PRESSURE: 109 MMHG | OXYGEN SATURATION: 100 % | HEIGHT: 63 IN | WEIGHT: 144.8 LBS

## 2018-08-07 DIAGNOSIS — G47.09 OTHER INSOMNIA: ICD-10-CM

## 2018-08-07 DIAGNOSIS — Z00.00 ENCOUNTER FOR ROUTINE ADULT HEALTH EXAMINATION WITHOUT ABNORMAL FINDINGS: Primary | ICD-10-CM

## 2018-08-07 DIAGNOSIS — F41.1 GAD (GENERALIZED ANXIETY DISORDER): ICD-10-CM

## 2018-08-07 DIAGNOSIS — F41.1 GENERALIZED ANXIETY DISORDER: ICD-10-CM

## 2018-08-07 DIAGNOSIS — E83.110 HEREDITARY HEMOCHROMATOSIS (H): ICD-10-CM

## 2018-08-07 LAB
CHOLEST SERPL-MCNC: 219 MG/DL
ERYTHROCYTE [DISTWIDTH] IN BLOOD BY AUTOMATED COUNT: 12.3 % (ref 10–15)
FERRITIN SERPL-MCNC: 31 NG/ML (ref 12–150)
HCT VFR BLD AUTO: 38.9 % (ref 35–47)
HDLC SERPL-MCNC: 44 MG/DL
HGB BLD-MCNC: 13 G/DL (ref 11.7–15.7)
IRON SATN MFR SERPL: 26 % (ref 15–46)
IRON SERPL-MCNC: 67 UG/DL (ref 35–180)
LDLC SERPL CALC-MCNC: 127 MG/DL
MCH RBC QN AUTO: 31.6 PG (ref 26.5–33)
MCHC RBC AUTO-ENTMCNC: 33.4 G/DL (ref 31.5–36.5)
MCV RBC AUTO: 94 FL (ref 78–100)
NONHDLC SERPL-MCNC: 175 MG/DL
PLATELET # BLD AUTO: 229 10E9/L (ref 150–450)
RBC # BLD AUTO: 4.12 10E12/L (ref 3.8–5.2)
TIBC SERPL-MCNC: 254 UG/DL (ref 240–430)
TRIGL SERPL-MCNC: 241 MG/DL
WBC # BLD AUTO: 6.6 10E9/L (ref 4–11)

## 2018-08-07 PROCEDURE — 83540 ASSAY OF IRON: CPT | Performed by: FAMILY MEDICINE

## 2018-08-07 PROCEDURE — 99396 PREV VISIT EST AGE 40-64: CPT | Performed by: FAMILY MEDICINE

## 2018-08-07 PROCEDURE — 85027 COMPLETE CBC AUTOMATED: CPT | Performed by: FAMILY MEDICINE

## 2018-08-07 PROCEDURE — 80061 LIPID PANEL: CPT | Performed by: FAMILY MEDICINE

## 2018-08-07 PROCEDURE — 83550 IRON BINDING TEST: CPT | Performed by: FAMILY MEDICINE

## 2018-08-07 PROCEDURE — 82728 ASSAY OF FERRITIN: CPT | Performed by: FAMILY MEDICINE

## 2018-08-07 PROCEDURE — 99213 OFFICE O/P EST LOW 20 MIN: CPT | Mod: 25 | Performed by: FAMILY MEDICINE

## 2018-08-07 PROCEDURE — G0145 SCR C/V CYTO,THINLAYER,RESCR: HCPCS | Performed by: FAMILY MEDICINE

## 2018-08-07 PROCEDURE — 36415 COLL VENOUS BLD VENIPUNCTURE: CPT | Performed by: FAMILY MEDICINE

## 2018-08-07 PROCEDURE — 87624 HPV HI-RISK TYP POOLED RSLT: CPT | Performed by: FAMILY MEDICINE

## 2018-08-07 RX ORDER — BUPROPION HYDROCHLORIDE 100 MG/1
TABLET, EXTENDED RELEASE ORAL
Start: 2018-08-07

## 2018-08-07 RX ORDER — TRAZODONE HYDROCHLORIDE 50 MG/1
100 TABLET, FILM COATED ORAL AT BEDTIME
Qty: 60 TABLET | Refills: 5 | Status: SHIPPED | OUTPATIENT
Start: 2018-08-07 | End: 2019-02-22

## 2018-08-07 RX ORDER — LORAZEPAM 0.5 MG/1
0.5 TABLET ORAL EVERY 8 HOURS PRN
Qty: 20 TABLET | Refills: 0 | Status: SHIPPED | OUTPATIENT
Start: 2018-08-07 | End: 2019-01-16

## 2018-08-07 RX ORDER — BUPROPION HYDROCHLORIDE 100 MG/1
TABLET, EXTENDED RELEASE ORAL
Qty: 60 TABLET | Refills: 1 | Status: SHIPPED | OUTPATIENT
Start: 2018-08-07 | End: 2018-10-15

## 2018-08-07 ASSESSMENT — PATIENT HEALTH QUESTIONNAIRE - PHQ9: 5. POOR APPETITE OR OVEREATING: SEVERAL DAYS

## 2018-08-07 ASSESSMENT — ANXIETY QUESTIONNAIRES
IF YOU CHECKED OFF ANY PROBLEMS ON THIS QUESTIONNAIRE, HOW DIFFICULT HAVE THESE PROBLEMS MADE IT FOR YOU TO DO YOUR WORK, TAKE CARE OF THINGS AT HOME, OR GET ALONG WITH OTHER PEOPLE: SOMEWHAT DIFFICULT
3. WORRYING TOO MUCH ABOUT DIFFERENT THINGS: SEVERAL DAYS
5. BEING SO RESTLESS THAT IT IS HARD TO SIT STILL: SEVERAL DAYS
6. BECOMING EASILY ANNOYED OR IRRITABLE: MORE THAN HALF THE DAYS
7. FEELING AFRAID AS IF SOMETHING AWFUL MIGHT HAPPEN: MORE THAN HALF THE DAYS
2. NOT BEING ABLE TO STOP OR CONTROL WORRYING: NOT AT ALL
1. FEELING NERVOUS, ANXIOUS, OR ON EDGE: MORE THAN HALF THE DAYS
GAD7 TOTAL SCORE: 9

## 2018-08-07 NOTE — MR AVS SNAPSHOT
After Visit Summary   8/7/2018    Yoselyn Mcgill    MRN: [id number]           Patient Information     Date Of Birth          1975        Visit Information        Provider Department      8/7/2018 9:30 AM Elisabeth Powell DO [facility]        Today's Diagnoses     Encounter for routine adult health examination without abnormal findings    -  1    YANIRA (generalized anxiety disorder)        Generalized anxiety disorder        Other insomnia        Hereditary hemochromatosis (H)          Care Instructions    PLEASE CALL TO SCHEDULE YOUR MAMMOGRAM  [facility] [phone]  [facility] [phone]  [facility]   [phone]      Preventive Health Recommendations  Female Ages 40 to 49    Yearly exam:     See your health care provider every year in order to  1. Review health changes.   2. Discuss preventive care.    3. Review your medicines if your doctor prescribed any.      Get a Pap test every three years (unless you have an abnormal result and your provider advises testing more often).      If you get Pap tests with HPV test, you only need to test every 5 years, unless you have an abnormal result. You do not need a Pap test if your uterus was removed (hysterectomy) and you have not had cancer.      You should be tested each year for STDs (sexually transmitted diseases), if you're at risk.     Ask your doctor if you should have a mammogram.      Have a colonoscopy (test for colon cancer) if someone in your family has had colon cancer or polyps before age 50.       Have a cholesterol test every 5 years.       Have a diabetes test (fasting glucose) after age 45. If you are at risk for diabetes, you should have this test every 3 years.    Shots: Get a flu shot each year. Get a tetanus shot every 10 years.     Nutrition:     Eat at least 5 servings of fruits and vegetables each day.    Eat whole-grain bread, whole-wheat  "pasta and brown rice instead of white grains and rice.    Get adequate Calcium and Vitamin D.      Lifestyle    Exercise at least 150 minutes a week (an average of 30 minutes a day, 5 days a week). This will help you control your weight and prevent disease.    Limit alcohol to one drink per day.    No smoking.     Wear sunscreen to prevent skin cancer.    See your dentist every six months for an exam and cleaning.          Follow-ups after your visit        Who to contact     If you have questions or need follow up information about today's clinic visit or your schedule please contact [facility] directly at [phone].  Normal or non-critical lab and imaging results will be communicated to you by [employer]hart, letter or phone within 4 business days after the clinic has received the results. If you do not hear from us within 7 days, please contact the clinic through [employer] or phone. If you have a critical or abnormal lab result, we will notify you by phone as soon as possible.  Submit refill requests through [employer] or call your pharmacy and they will forward the refill request to us. Please allow 3 business days for your refill to be completed.          Additional Information About Your Visit        [employer] Medical Information     [employer] gives you secure access to your electronic health record. If you see a primary care provider, you can also send messages to your care team and make appointments. If you have questions, please call your primary care clinic.  If you do not have a primary care provider, please call [phone] and they will assist you.        Care EveryWhere ID     This is your Care EveryWhere ID. This could be used by other organizations to access your [place] medical records  [id number]        Your Vitals Were     Pulse Temperature Height Last Period Pulse Oximetry BMI (Body Mass Index)    58 98.3  F (36.8  C) (Oral) 5' 3\" (1.6 m) 07/29/2018 100% 25.65 kg/m2       Blood Pressure from Last 3 " Encounters:   08/07/18 109/68   02/26/18 120/81   10/16/17 114/68    Weight from Last 3 Encounters:   08/07/18 144 lb 12.8 oz (65.7 kg)   10/16/17 147 lb 8 oz (66.9 kg)   07/19/17 144 lb (65.3 kg)              We Performed the Following     CBC with platelets     Ferritin     HPV High Risk Types DNA Cervical     Iron and iron binding capacity     Lipid panel reflex to direct LDL Non-fasting     Pap imaged thin layer screen with HPV - recommended age 30 - 65 years (select HPV order below)          Today's Medication Changes          These changes are accurate as of 8/7/18 10:09 AM.  If you have any questions, ask your nurse or doctor.               Start taking these medicines.        Dose/Directions    buPROPion 100 MG 12 hr tablet   Commonly known as:  WELLBUTRIN SR   Used for:  YANIRA (generalized anxiety disorder), Generalized anxiety disorder   Started by:  Elisabeth Powell DO        Start one tablet daily and increase to twice a day   Quantity:  60 tablet   Refills:  1         These medicines have changed or have updated prescriptions.        Dose/Directions    sertraline 50 MG tablet   Commonly known as:  ZOLOFT   This may have changed:  See the new instructions.   Used for:  Generalized anxiety disorder   Changed by:  Elisabeth Powell DO        Dose:  25 mg   Take 0.5 tablets (25 mg) by mouth daily TAKE 1 TABLET(50 MG) BY MOUTH DAILY   Quantity:  45 tablet   Refills:  1       traZODone 50 MG tablet   Commonly known as:  DESYREL   This may have changed:  See the new instructions.   Used for:  Other insomnia   Changed by:  Elisabeth Powell DO        Dose:  100 mg   Take 2 tablets (100 mg) by mouth At Bedtime   Quantity:  60 tablet   Refills:  5            Where to get your medicines      These medications were sent to [facility] [address] [place] [address] [address] [place] [address] AT [address] [address]    Hours:  24-hours Phone:  [phone]     buPROPion 100 MG 12 hr tablet     sertraline 50 MG tablet    traZODone 50 MG tablet         Some of these will need a paper prescription and others can be bought over the counter.  Ask your nurse if you have questions.     Bring a paper prescription for each of these medications     LORazepam 0.5 MG tablet                Primary Care Provider Office Phone # Fax #    Elisabeth Powell [phone] [address] [address]        Equal Access to Services     JASMINA KAISER : Hadii aad ku hadasho Soomaali, waaxda luqadaha, qaybta kaalmada adeegyada, waxay idiin hayaan adeeg kharash laelsa . So [facility] [phone].    ATENCIÓN: Si simeon jackson, tiene a matthew disposición servicios gratuitos de asistencia lingüística. [facility] [phone].    We comply with applicable federal civil rights laws and [place] laws. We do not discriminate on the basis of race, color, national origin, age, disability, sex, sexual orientation, or gender identity.            Thank you!     Thank you for choosing [facility]  for your care. Our goal is always to provide you with excellent care. Hearing back from our patients is one way we can continue to improve our services. Please take a few minutes to complete the written survey that you may receive in the mail after your visit with us. Thank you!             Your Updated Medication List - Protect others around you: Learn how to safely use, store and throw away your medicines at www.disposemymeds.org.          This list is accurate as of 8/7/18 10:09 AM.  Always use your most recent med list.                   Brand Name Dispense Instructions for use Diagnosis    buPROPion 100 MG 12 hr tablet    WELLBUTRIN SR    60 tablet    Start one tablet daily and increase to twice a day    YANIRA (generalized anxiety disorder), Generalized anxiety disorder       clindamycin 1 % lotion    CLINDAMAX    60 mL    Apply topically 2 times daily    Acne vulgaris       LORazepam 0.5 MG tablet     ATIVAN    20 tablet    Take 1 tablet (0.5 mg) by mouth every 8 hours as needed for anxiety    Generalized anxiety disorder       OMEGA-3 FISH OIL PO           PRENATAL VITAMINS PO           PRO-BIOTIC BLEND PO           sertraline 50 MG tablet    ZOLOFT    45 tablet    Take 0.5 tablets (25 mg) by mouth daily TAKE 1 TABLET(50 MG) BY MOUTH DAILY    Generalized anxiety disorder       traZODone 50 MG tablet    DESYREL    60 tablet    Take 2 tablets (100 mg) by mouth At Bedtime    Other insomnia       VITAMIN D (CHOLECALCIFEROL) PO      Take by mouth daily

## 2018-08-07 NOTE — PATIENT INSTRUCTIONS
PLEASE CALL TO SCHEDULE YOUR MAMMOGRAM  [facility] [phone]  [facility] [phone]  [facility]   [phone]      Preventive Health Recommendations  Female Ages 40 to 49    Yearly exam:     See your health care provider every year in order to  1. Review health changes.   2. Discuss preventive care.    3. Review your medicines if your doctor prescribed any.      Get a Pap test every three years (unless you have an abnormal result and your provider advises testing more often).      If you get Pap tests with HPV test, you only need to test every 5 years, unless you have an abnormal result. You do not need a Pap test if your uterus was removed (hysterectomy) and you have not had cancer.      You should be tested each year for STDs (sexually transmitted diseases), if you're at risk.     Ask your doctor if you should have a mammogram.      Have a colonoscopy (test for colon cancer) if someone in your family has had colon cancer or polyps before age 50.       Have a cholesterol test every 5 years.       Have a diabetes test (fasting glucose) after age 45. If you are at risk for diabetes, you should have this test every 3 years.    Shots: Get a flu shot each year. Get a tetanus shot every 10 years.     Nutrition:     Eat at least 5 servings of fruits and vegetables each day.    Eat whole-grain bread, whole-wheat pasta and brown rice instead of white grains and rice.    Get adequate Calcium and Vitamin D.      Lifestyle    Exercise at least 150 minutes a week (an average of 30 minutes a day, 5 days a week). This will help you control your weight and prevent disease.    Limit alcohol to one drink per day.    No smoking.     Wear sunscreen to prevent skin cancer.    See your dentist every six months for an exam and cleaning.

## 2018-08-07 NOTE — PROGRESS NOTES
Dear Yoselyn,   Your test results are all back -   -LDL(bad) cholesterol level is elevated, HDL(good) cholesterol level is low and your triglycerides are elevated which can increase your heart disease risk.  A diet high in fat and simple carbohydrates, genetics and being overweight can contribute to this. ADVISE: Exercise, a low fat, low carbohydrate diet, weight control, and omega-3 fatty acids (fish oil) 5060-6584 mg daily are helpful to improve this.  Rechecking your fasting cholesterol panel in 12 months is recommended (Lipid w/ LDL reflex, DX: hyperlipidemia)  -Normal red blood cell (hgb) levels, normal white blood cell count and normal platelet levels.  Normal iron.  Let us know if you have any questions.  -Elisabeth Powell, DO

## 2018-08-07 NOTE — PROGRESS NOTES
SUBJECTIVE:   CC: Yoselyn Mcgill is an 42 year old woman who presents for preventive health visit.     Physical   Annual:     Getting at least 3 servings of Calcium per day:  Yes    Bi-annual eye exam:  Yes    Dental care twice a year:  Yes    Sleep apnea or symptoms of sleep apnea:  None    Diet:  Gluten-free/reduced    Frequency of exercise:  4-5 days/week    Duration of exercise:  45-60 minutes    Taking medications regularly:  Yes    Medication side effects:  Other    Additional concerns today:  YES        PROBLEMS TO ADD ON...  Anxiety is worse but only on zoloft 50mg every other day due to libido side effects  Wonders about options    Today's PHQ-2 Score:   PHQ-2 (  [employer]) 2018   Q1: Little interest or pleasure in doing things 0   Q2: Feeling down, depressed or hopeless 0   PHQ-2 Score 0   Q1: Little interest or pleasure in doing things Not at all   Q2: Feeling down, depressed or hopeless Not at all   PHQ-2 Score 0       Abuse: Current or Past(Physical, Sexual or Emotional)- NO  Do you feel safe in your environment - YES    Social History   Substance Use Topics     Smoking status: Never Smoker     Smokeless tobacco: Never Used     Alcohol use 0.0 oz/week     0 Standard drinks or equivalent per week      Comment: socially     Alcohol Use 2018   If you drink alcohol do you typically have greater than 3 drinks per day OR greater than 7 drinks per week? No   No flowsheet data found.    Reviewed orders with patient.  Reviewed health maintenance and updated orders accordingly - Yes  Patient Active Problem List   Diagnosis     Dysplasia of cervix     CARDIOVASCULAR SCREENING; LDL GOAL LESS THAN 160     Family history of malignant neoplasm of breast     YANIRA (generalized anxiety disorder)     S/P  section     Family history of hemochromatosis     Hereditary hemochromatosis (H)     Past Surgical History:   Procedure Laterality Date     ANKLE SURGERY  08/10/11      SECTION N/A  2015    Procedure:  SECTION;  Surgeon: Emma Kendall MD;  Location: [facility]     ELECTROCONVULSIVE THERAPY  -    [facility] Dr Carpenter       Social History   Substance Use Topics     Smoking status: Never Smoker     Smokeless tobacco: Never Used     Alcohol use 0.0 oz/week     0 Standard drinks or equivalent per week      Comment: socially     Family History   Problem Relation Age of Onset     C.A.D. Mother      Hypertension Mother      Breast Cancer Mother      C.A.D. Maternal Grandfather      Hypertension Maternal Grandfather      Alcohol/Drug Maternal Grandfather      C.A.D. Paternal Grandfather      Depression Paternal Grandmother            Patient under age 50, mutual decision reflected in health maintenance.      Pertinent mammograms are reviewed under the imaging tab.  History of abnormal Pap smear: YES - updated in Problem List and Health Maintenance accordingly  PAP / HPV Latest Ref Rng & Units 2015   PAP - NIL NIL   HPV 16 DNA NEG Negative -   HPV 18 DNA NEG Negative -   OTHER HR HPV NEG Negative -     Reviewed and updated as needed this visit by clinical staff  Tobacco  Allergies  Meds  Problems  Med Hx  Surg Hx  Fam Hx  Soc Hx          Reviewed and updated as needed this visit by Provider  Allergies  Meds  Problems            Review of Systems  CONSTITUTIONAL: NEGATIVE for fever, chills, change in weight  INTEGUMENTARU/SKIN: NEGATIVE for worrisome rashes, moles or lesions  EYES: NEGATIVE for vision changes or irritation  ENT: NEGATIVE for ear, mouth and throat problems  RESP: NEGATIVE for significant cough or SOB  BREAST: NEGATIVE for masses, tenderness or discharge  CV: NEGATIVE for chest pain, palpitations or peripheral edema  GI: NEGATIVE for nausea, abdominal pain, heartburn, or change in bowel habits  : NEGATIVE for unusual urinary or vaginal symptoms. Periods are regular.  MUSCULOSKELETAL: NEGATIVE for significant arthralgias or myalgia  NEURO:  "NEGATIVE for weakness, dizziness or paresthesias  PSYCHIATRIC: as above     OBJECTIVE:   /68  Pulse 58  Temp 98.3  F (36.8  C) (Oral)  Ht 5' 3\" (1.6 m)  Wt 144 lb 12.8 oz (65.7 kg)  LMP 07/29/2018  SpO2 100%  BMI 25.65 kg/m2  Physical Exam  GENERAL: healthy, alert and no distress  EYES: Eyes grossly normal to inspection, PERRL and conjunctivae and sclerae normal  HENT: ear canals and TM's normal, nose and mouth without ulcers or lesions  NECK: no adenopathy, no asymmetry, masses, or scars and thyroid normal to palpation  RESP: lungs clear to auscultation - no rales, rhonchi or wheezes  BREAST: normal without masses, tenderness or nipple discharge and no palpable axillary masses or adenopathy  CV: regular rate and rhythm, normal S1 S2, no S3 or S4, no murmur, click or rub, no peripheral edema and peripheral pulses strong  ABDOMEN: soft, nontender, no hepatosplenomegaly, no masses and bowel sounds normal   (female): normal female external genitalia, normal urethral meatus, vaginal mucosa pink, moist, well rugated, and normal cervix/adnexa/uterus without masses or discharge  MS: no gross musculoskeletal defects noted, no edema  SKIN: no suspicious lesions or rashes  NEURO: Normal strength and tone, mentation intact and speech normal  PSYCH: mentation appears normal, affect normal/bright    Diagnostic Test Results:  none     ASSESSMENT/PLAN:   1. Encounter for routine adult health examination without abnormal findings     - Pap imaged thin layer screen with HPV - recommended age 30 - 65 years (select HPV order below)  - HPV High Risk Types DNA Cervical  - Lipid panel reflex to direct LDL Non-fasting    2. YANIRA (generalized anxiety disorder)  Will add wellbutrin to her zoloft -   Patient taking zoloft 25mg daily   Having some libido side effects that are trouble some  Discussed trial of wellbutin but only take 12hr SR ONCE A DAY  - buPROPion (WELLBUTRIN SR) 100 MG 12 hr tablet; Start one tablet daily and " "increase to twice a day  Dispense: 60 tablet; Refill: 1    3. Generalized anxiety disorder   as above  - buPROPion (WELLBUTRIN SR) 100 MG 12 hr tablet; Start one tablet daily and increase to twice a day  Dispense: 60 tablet; Refill: 1  - LORazepam (ATIVAN) 0.5 MG tablet; Take 1 tablet (0.5 mg) by mouth every 8 hours as needed for anxiety  Dispense: 20 tablet; Refill: 0    4. Other insomnia   refilled  - traZODone (DESYREL) 50 MG tablet; Take 2 tablets (100 mg) by mouth At Bedtime  Dispense: 60 tablet; Refill: 5    5. Hereditary hemochromatosis (H)  pending  - Ferritin  - Iron and iron binding capacity  - CBC with platelets    COUNSELING:  Reviewed preventive health counseling, as reflected in patient instructions    BP Readings from Last 1 Encounters:   08/07/18 109/68     Estimated body mass index is 25.65 kg/(m^2) as calculated from the following:    Height as of this encounter: 5' 3\" (1.6 m).    Weight as of this encounter: 144 lb 12.8 oz (65.7 kg).      Weight management plan: Discussed healthy diet and exercise guidelines and patient will follow up in 12 months in clinic to re-evaluate.     reports that she has never smoked. She has never used smokeless tobacco.      Counseling Resources:  ATP IV Guidelines  Pooled Cohorts Equation Calculator  Breast Cancer Risk Calculator  FRAX Risk Assessment  ICSI Preventive Guidelines  Dietary Guidelines for Americans, 2010  USDA's MyPlate  ASA Prophylaxis  Lung CA Screening    Elisabeth Powell, DO  [facility]  "

## 2018-08-07 NOTE — TELEPHONE ENCOUNTER
"Rx sent today for 30 day with 1 refill - new med  Pharmacy requesting 90 day  Denied  New med - 90 day not appropriate  Isabella PANCHAL RN    Requested Prescriptions   Pending Prescriptions Disp Refills     buPROPion (WELLBUTRIN SR) 100 MG 12 hr tablet [Pharmacy Med Name: BUPROPION SR 100MG TABLETS (12H)] 180 tablet 1     Sig: TAKE 1 TABLET BY MOUTH TWICE DAILY    SSRIs Protocol Passed    8/7/2018 10:23 AM       Passed - Recent (12 mo) or future (30 days) visit within the authorizing provider's specialty    Patient had office visit in the last 12 months or has a visit in the next 30 days with authorizing provider or within the authorizing provider's specialty.  See \"Patient Info\" tab in inbasket, or \"Choose Columns\" in Meds & Orders section of the refill encounter.           Passed - Medication is Bupropion    If the medication is Bupropion (Wellbutrin), and the patient is taking for smoking cessation; OK to refill.         Passed - Patient is age 18 or older       Passed - No active pregnancy on record       Passed - No positive pregnancy test in last 12 months            "

## 2018-08-07 NOTE — TELEPHONE ENCOUNTER
PN  Pharmacy called.  Sertraline Rx has 2 different directions.  Take 0.5 tablets daily then says take 1 tablet daily.    Please clarify directions.  Thanks, Elizabeth Elder RN

## 2018-08-07 NOTE — NURSING NOTE
"Chief Complaint   Patient presents with     Physical     discuss zoloft-possible alternative, with pap-discuss mammo, family hx of breast ca     Medication Reconciliation     pt is currently taking zoloft every other day, with improvement in lipido, but feels like sleep and anxiety aren't improving like they should     /68  Pulse 58  Temp 98.3  F (36.8  C) (Oral)  Ht 5' 3\" (1.6 m)  Wt 144 lb 12.8 oz (65.7 kg)  LMP 07/29/2018  SpO2 100%  BMI 25.65 kg/m2 Estimated body mass index is 25.65 kg/(m^2) as calculated from the following:    Height as of this encounter: 5' 3\" (1.6 m).    Weight as of this encounter: 144 lb 12.8 oz (65.7 kg).        Health Maintenance due pending provider review:  Pap Smear    PAP--Possibly completing today, per Provider review    Lupe Kay CMA  "

## 2018-08-07 NOTE — TELEPHONE ENCOUNTER
Reason for Call:  Other Med Clarification    Detailed comments: sertraline (ZOLOFT) 50 MG tablet  Take 0.5 tablets (25 mg) by mouth daily TAKE 1 TABLET(50 MG) BY MOUTH DAILY - Oral  2 different directions, which directions does Dr Powell want?  Please call with a verbal or send new order     Phone Number Patient can be reached at:   Dimitri Zurita Pharmacist [phone]    Best Time: anytime    Can we leave a detailed message on this number? YES    Call taken on 8/7/2018 at 10:15 AM by Gregoria Merino

## 2018-08-08 ASSESSMENT — PATIENT HEALTH QUESTIONNAIRE - PHQ9: SUM OF ALL RESPONSES TO PHQ QUESTIONS 1-9: 5

## 2018-08-08 ASSESSMENT — ANXIETY QUESTIONNAIRES: GAD7 TOTAL SCORE: 9

## 2018-08-09 LAB
COPATH REPORT: NORMAL
PAP: NORMAL

## 2018-08-10 LAB
FINAL DIAGNOSIS: NORMAL
HPV HR 12 DNA CVX QL NAA+PROBE: NEGATIVE
HPV16 DNA SPEC QL NAA+PROBE: NEGATIVE
HPV18 DNA SPEC QL NAA+PROBE: NEGATIVE
SPECIMEN DESCRIPTION: NORMAL
SPECIMEN SOURCE CVX/VAG CYTO: NORMAL

## 2018-08-11 ENCOUNTER — NURSE TRIAGE (OUTPATIENT)
Dept: NURSING | Facility: CLINIC | Age: 43
End: 2018-08-11

## 2018-08-11 NOTE — TELEPHONE ENCOUNTER
"Trying to obtain refill of Ativan from [facility] pharmacy.  [facility] is telling the patient that Ativan is not available within the [facility] system due to a  delay.  [facility] told patient to contact her care provider to see if they can order an alternate medication instead of the Ativan.    Patient is putting her dog to sleep within a few hours and feels she needs to have the Ativan 'in her tool box.'    Called [facility] [phone], they have Ativan 0.5 mg available.      Called [facility] [phone], to request that they transfer the prescription.  However, as it is a \"new prescription,\" they cannot transfer it.  Since it came in as a fax, they cannot give the patient the paper prescription.  They are willing to give the patient Ativan 1 mg tablets (they will dispense 10 tablets) that she will need to break in half.  Requested that they fill this precipition for the patient.    Phoned patient back, reviewed that she will break the Ativan in half and take 1/2 tablet (0.5 mg) every 8 hours as needed.  Patient is to  this prescription at [facility] on [address].  Patient agrees to this plan and is grateful for this assistance.    Protocol and care advice reviewed  Caller states understanding of the recommended disposition  Advised to call back if further questions or concerns      Additional Information    Caller has medication question only, adult not sick, and triager answers question    Protocols used: MEDICATION QUESTION CALL-ADULT-      "

## 2018-08-31 DIAGNOSIS — L70.0 ACNE VULGARIS: ICD-10-CM

## 2018-08-31 RX ORDER — CLINDAMYCIN PHOSPHATE 10 UG/ML
LOTION TOPICAL
Qty: 60 ML | Refills: 2 | Status: SHIPPED | OUTPATIENT
Start: 2018-08-31 | End: 2019-12-18

## 2018-08-31 NOTE — TELEPHONE ENCOUNTER
"Prescription approved per [facility] Refill Protocol.  Isabella PANCHAL RN    Requested Prescriptions   Pending Prescriptions Disp Refills     clindamycin (CLEOCIN T) 1 % lotion [Pharmacy Med Name: CLINDAMYCIN 1% LOTION 60ML]  0     Sig: APPLY EXTERNALLY TO THE AFFECTED AREA TWICE DAILY    Topical Acne Medications Protocol Passed    8/31/2018  8:50 AM       Passed - Patient is 12 years of age or older       Passed - Recent (12 mo) or future (30 days) visit within the authorizing provider's specialty    Patient had office visit in the last 12 months or has a visit in the next 30 days with authorizing provider or within the authorizing provider's specialty.  See \"Patient Info\" tab in inbasket, or \"Choose Columns\" in Meds & Orders section of the refill encounter.                "

## 2018-10-15 DIAGNOSIS — F41.1 GAD (GENERALIZED ANXIETY DISORDER): ICD-10-CM

## 2018-10-15 DIAGNOSIS — F41.1 GENERALIZED ANXIETY DISORDER: ICD-10-CM

## 2018-10-15 NOTE — TELEPHONE ENCOUNTER
"Med started at 8/7/2018 OV  Sent MyChart to pt  Isabella PANCHAL RN    Requested Prescriptions   Pending Prescriptions Disp Refills     buPROPion (WELLBUTRIN SR) 100 MG 12 hr tablet [Pharmacy Med Name: BUPROPION SR 100MG TABLETS (12H)] 60 tablet 0     Sig: TAKE 1 TABLET BY MOUTH TWICE DAILY    SSRIs Protocol Passed    10/15/2018 11:04 AM       Passed - Recent (12 mo) or future (30 days) visit within the authorizing provider's specialty    Patient had office visit in the last 12 months or has a visit in the next 30 days with authorizing provider or within the authorizing provider's specialty.  See \"Patient Info\" tab in inbasket, or \"Choose Columns\" in Meds & Orders section of the refill encounter.           Passed - Medication is Bupropion    If the medication is Bupropion (Wellbutrin), and the patient is taking for smoking cessation; OK to refill.         Passed - Patient is age 18 or older       Passed - No active pregnancy on record       Passed - No positive pregnancy test in last 12 months            "

## 2018-10-16 RX ORDER — BUPROPION HYDROCHLORIDE 100 MG/1
100 TABLET, EXTENDED RELEASE ORAL DAILY
Qty: 90 TABLET | Refills: 1 | Status: SHIPPED | OUTPATIENT
Start: 2018-10-16 | End: 2019-02-22

## 2018-10-16 NOTE — TELEPHONE ENCOUNTER
PN,   Patient states she's taking just one tab daily  Do you want phone visit to f/u with pt?  Please authorize if appropriate.  Thanks,  Isabella PANCHAL RN

## 2018-11-13 ENCOUNTER — TRANSFERRED RECORDS (OUTPATIENT)
Dept: HEALTH INFORMATION MANAGEMENT | Facility: CLINIC | Age: 43
End: 2018-11-13

## 2018-12-26 ENCOUNTER — TELEPHONE (OUTPATIENT)
Dept: FAMILY MEDICINE | Facility: CLINIC | Age: 43
End: 2018-12-26

## 2018-12-26 NOTE — TELEPHONE ENCOUNTER
Reason for call:  Symptom   Symptom or request: Stabbing pain, intermittent sharp to dull ache for 2 wks.    Duration (how long have symptoms been present): 2 wks  Have you been treated for this before? No    Additional comments:Pain feels better while she's eating, but as stomach empties her stomach begins to hurt again. She's been gassy and has had loose stools.     Phone number to reach patient:  Cell number on file:    Telephone Information:   Mobile [phone]       Best Time:  Anytime    Can we leave a detailed message on this number?  YES

## 2018-12-26 NOTE — TELEPHONE ENCOUNTER
Couple of weeks of abdominal discomfort primarily epigastric area. Happens a few hours after eating. Feels like if she could belch there would be relief. No recent travel no real change in diet but has been eating out more.    Hasn't had any recent abx use  Has tried probiotics and fermented foods, but no changes. Hasn't tried any OTC medications.  Brought up symptoms of h. Pylori as possibility. Scheduled with PN with first available. Will call back if symptoms worsen.    Nicole Cardenas RN

## 2019-01-15 DIAGNOSIS — F41.1 GENERALIZED ANXIETY DISORDER: ICD-10-CM

## 2019-01-15 NOTE — TELEPHONE ENCOUNTER
FW: Appointment Request   ZavaletaMarla   Sent: Tue January 15, 2019  2:47 PM   To: P Up [place] Triage    Yoselyn Mcgill   MRN: [id number] : 1975   Pt Home: [phone]     Entered: [phone]           Message     Triage, please advise regarding her refill request, patient is scheduled on 19 with Dr. Powell.      Thank You,   Terri Zavaleta   Care Unit Coordinator      ----- Message -----   From: Yoselynashley Mcgill   Sent: 2019   9:17 PM   To: Up [place]   Subject: Appointment Request                                ----- Message from [facility] sent at 2019  9:17 PM CST -----      Appointment Request From: Yoselyn Mcgill      With Provider: Elisabeth Powell DO [[facility]]      Preferred Date Range: 2019 - 2/15/2019      Preferred Times: Any time      Reason for visit: Request an Appointment      Comments:   I would like to see Dr Powell for my annual physical, I think I am due. Also, I called in a refill for my lorazepam because I was accidentally taking a whole pill instead of a half when I was anxious. I have two halves left at this time.

## 2019-01-16 RX ORDER — LORAZEPAM 0.5 MG/1
0.5 TABLET ORAL EVERY 8 HOURS PRN
Qty: 20 TABLET | Refills: 0 | Status: SHIPPED | OUTPATIENT
Start: 2019-01-16 | End: 2020-10-22

## 2019-01-16 NOTE — TELEPHONE ENCOUNTER
PN  Controlled Substance Refill Request for Lorazepam 0.5 mg    Last refill: 8/11/2018 - #10 [place].    Last clinic visit: 8/7/2018    Clinic visit frequency required: Not documented  Next appt: Future OV with you 1/25/19 - anxiety follow up    Controlled substance agreement on file: No.    Documentation in problem list reviewed:  No    Processing:  Fax Rx [facility]    [place] reviewed:  reviewed- no concerns  [place]  [email or web address]  Thanks, Elizabeth Elder RN

## 2019-01-17 RX ORDER — LORAZEPAM 1 MG/1
TABLET ORAL
Start: 2019-01-17

## 2019-01-17 NOTE — TELEPHONE ENCOUNTER
Faxed Rx to [facility] [address], lvm informing patient Rx Faxed  Terri [facility] Unit Coordinator

## 2019-01-17 NOTE — TELEPHONE ENCOUNTER
Denied  Duplicate; Rx written and faxed 1/16/2019  Isabella PANCHAL RN    Requested Prescriptions   Refused Prescriptions Disp Refills     LORazepam (ATIVAN) 1 MG tablet [Pharmacy Med Name: LORAZEPAM 1MG TABLETS]       Sig: TAKE 1/2 TABLET BY MOUTH EVERY 8 HOURS AS NEEDED FOR ANXIETY    There is no refill protocol information for this order

## 2019-01-25 ENCOUNTER — OFFICE VISIT (OUTPATIENT)
Dept: FAMILY MEDICINE | Facility: CLINIC | Age: 44
End: 2019-01-25
Payer: COMMERCIAL

## 2019-01-25 VITALS
SYSTOLIC BLOOD PRESSURE: 126 MMHG | OXYGEN SATURATION: 100 % | BODY MASS INDEX: 24.68 KG/M2 | WEIGHT: 139.3 LBS | HEART RATE: 75 BPM | HEIGHT: 63 IN | DIASTOLIC BLOOD PRESSURE: 80 MMHG | TEMPERATURE: 99.4 F

## 2019-01-25 DIAGNOSIS — E78.2 MIXED HYPERLIPIDEMIA: ICD-10-CM

## 2019-01-25 DIAGNOSIS — F41.1 GAD (GENERALIZED ANXIETY DISORDER): Primary | ICD-10-CM

## 2019-01-25 PROCEDURE — 99214 OFFICE O/P EST MOD 30 MIN: CPT | Performed by: FAMILY MEDICINE

## 2019-01-25 ASSESSMENT — ANXIETY QUESTIONNAIRES
GAD7 TOTAL SCORE: 8
6. BECOMING EASILY ANNOYED OR IRRITABLE: MORE THAN HALF THE DAYS
2. NOT BEING ABLE TO STOP OR CONTROL WORRYING: NOT AT ALL
7. FEELING AFRAID AS IF SOMETHING AWFUL MIGHT HAPPEN: SEVERAL DAYS
5. BEING SO RESTLESS THAT IT IS HARD TO SIT STILL: SEVERAL DAYS
1. FEELING NERVOUS, ANXIOUS, OR ON EDGE: MORE THAN HALF THE DAYS
IF YOU CHECKED OFF ANY PROBLEMS ON THIS QUESTIONNAIRE, HOW DIFFICULT HAVE THESE PROBLEMS MADE IT FOR YOU TO DO YOUR WORK, TAKE CARE OF THINGS AT HOME, OR GET ALONG WITH OTHER PEOPLE: SOMEWHAT DIFFICULT
3. WORRYING TOO MUCH ABOUT DIFFERENT THINGS: SEVERAL DAYS

## 2019-01-25 ASSESSMENT — PATIENT HEALTH QUESTIONNAIRE - PHQ9
SUM OF ALL RESPONSES TO PHQ QUESTIONS 1-9: 1
5. POOR APPETITE OR OVEREATING: SEVERAL DAYS

## 2019-01-25 ASSESSMENT — MIFFLIN-ST. JEOR: SCORE: 1255.99

## 2019-01-25 NOTE — NURSING NOTE
"Chief Complaint   Patient presents with     Anxiety     wellbutrin and sertraline, taking      /80   Pulse 75   Temp 99.4  F (37.4  C) (Oral)   Ht 1.6 m (5' 3\")   Wt 63.2 kg (139 lb 4.8 oz)   SpO2 100%   BMI 24.68 kg/m   Estimated body mass index is 24.68 kg/m  as calculated from the following:    Height as of this encounter: 1.6 m (5' 3\").    Weight as of this encounter: 63.2 kg (139 lb 4.8 oz).        Health Maintenance due pending provider review:  NONE    n/a    Lupe Kay CMA  "

## 2019-01-25 NOTE — PROGRESS NOTES
SUBJECTIVE:   Yoselyn Mcgill is a 43 year old female who presents to clinic today for the following health issues:      Anxiety Follow-Up    Status since last visit: pretty stable, but agitation more significant-quickly to be annoyed    Other associated symptoms:None    Complicating factors:   Significant life event: Yes-  Recent move and loss of pet   Current substance abuse: None  Depression symptoms: No  YANIRA-7 SCORE 2017   Total Score 2 9 8       YANIRA-7    Amount of exercise or physical activity: 3-4 x week    Problems taking medications regularly: No    Medication side effects: none    Diet: has removed with gluten      Anxiety   At last visit added wellbutrin - 2018  Taking sertraline and wellbutrin 3-4 times per week  Last 6 months more stressful - notices more irritability which is different   Family dog of 18 years passed away and recently moved.    In august was only taking the zoloft 25mg few days a week   Now Taking every other day - both at same time - wellbutrin and zoloft  No correlation with irritability days you take vs not take    Feels like the wellbutrin counters the sexual side effects     Lorazepam -   Got refill 3 weeks ago - had to request early due to medication error  Last refill in  was for 1mg tablets due to pharmacy shortage - pt not aware or forgot and was taking 1 tablet instead of 1/2 tablet.  So went through med faster.  Last refill prior to that   2/10/17  1/2/18  8/7/18 - got Rx from pharmacy 1mg - was suppose to be taking 0.5mg or 1/2 tablet  19      -------------------------------------    Problem list and histories reviewed & adjusted, as indicated.  Additional history: as documented    Patient Active Problem List   Diagnosis     Dysplasia of cervix     CARDIOVASCULAR SCREENING; LDL GOAL LESS THAN 160     Family history of malignant neoplasm of breast     YANIRA (generalized anxiety disorder)     S/P  section     Family history of  "hemochromatosis     Hereditary hemochromatosis (H)     Past Surgical History:   Procedure Laterality Date     ANKLE SURGERY  08/10/11      SECTION N/A 2015    Procedure:  SECTION;  Surgeon: Emma Kendall MD;  Location:  L+D     ELECTROCONVULSIVE THERAPY  -    [facility]       Social History     Tobacco Use     Smoking status: Never Smoker     Smokeless tobacco: Never Used   Substance Use Topics     Alcohol use: Yes     Alcohol/week: 0.0 oz     Comment: socially     Family History   Problem Relation Age of Onset     C.A.D. Mother      Hypertension Mother      Breast Cancer Mother      C.A.D. Maternal Grandfather      Hypertension Maternal Grandfather      Alcohol/Drug Maternal Grandfather      C.A.D. Paternal Grandfather      Depression Paternal Grandmother            Reviewed and updated as needed this visit by clinical staff  Tobacco  Allergies  Meds  Problems  Med Hx  Surg Hx  Fam Hx  Soc Hx        Reviewed and updated as needed this visit by Provider  Tobacco  Allergies  Meds  Problems  Med Hx  Surg Hx  Fam Hx         ROS:  Constitutional, HEENT, cardiovascular, pulmonary, GI, , musculoskeletal, neuro, skin, endocrine and psych systems are negative, except as otherwise noted.    OBJECTIVE:     /80   Pulse 75   Temp 99.4  F (37.4  C) (Oral)   Ht 1.6 m (5' 3\")   Wt 63.2 kg (139 lb 4.8 oz)   SpO2 100%   BMI 24.68 kg/m    Body mass index is 24.68 kg/m .  GENERAL: healthy, alert and no distress  NECK: no adenopathy, no asymmetry, masses, or scars and thyroid normal to palpation  RESP: lungs clear to auscultation - no rales, rhonchi or wheezes  CV: regular rate and rhythm, normal S1 S2, no S3 or S4, no murmur, click or rub, no peripheral edema and peripheral pulses strong  PSYCH: mentation appears normal, affect normal/bright    Diagnostic Test Results:  none     ASSESSMENT/PLAN:       1. YANIRA (generalized anxiety disorder)  Anxiety is still an " issue according to her GAD7 - discussed in the past was 2 at best, 9 back in August and 8 today.   Will have her try to keep taking both meds daily instead of every other day.  Will see her back in 3-4 weeks for skin tag removal and see how she is doing.  - Comprehensive metabolic panel (BMP + Alb, Alk Phos, ALT, AST, Total. Bili, TP); Future    2. Mixed hyperlipidemia  Working on healthier diet -   Recheck fasting labs  - Lipid panel reflex to direct LDL Fasting; Future    I spent over 25 minutes with patient and over 50% time in counseling regarding above issues.     Pt will call or RTC if symptoms worsen or do not improve.     Elisabeth Powell, DO  [facility]

## 2019-01-25 NOTE — PATIENT INSTRUCTIONS
PLEASE CALL TO SCHEDULE YOUR MAMMOGRAM  [facility] [phone]  [facility] [place] [phone]

## 2019-01-26 ASSESSMENT — ANXIETY QUESTIONNAIRES: GAD7 TOTAL SCORE: 8

## 2019-02-01 DIAGNOSIS — E78.2 MIXED HYPERLIPIDEMIA: ICD-10-CM

## 2019-02-01 DIAGNOSIS — F41.1 GAD (GENERALIZED ANXIETY DISORDER): ICD-10-CM

## 2019-02-01 LAB
ALBUMIN SERPL-MCNC: 3.7 G/DL (ref 3.4–5)
ALP SERPL-CCNC: 50 U/L (ref 40–150)
ALT SERPL W P-5'-P-CCNC: 16 U/L (ref 0–50)
ANION GAP SERPL CALCULATED.3IONS-SCNC: 4 MMOL/L (ref 3–14)
AST SERPL W P-5'-P-CCNC: 16 U/L (ref 0–45)
BILIRUB SERPL-MCNC: 0.3 MG/DL (ref 0.2–1.3)
BUN SERPL-MCNC: 12 MG/DL (ref 7–30)
CALCIUM SERPL-MCNC: 9 MG/DL (ref 8.5–10.1)
CHLORIDE SERPL-SCNC: 105 MMOL/L (ref 94–109)
CHOLEST SERPL-MCNC: 215 MG/DL
CO2 SERPL-SCNC: 28 MMOL/L (ref 20–32)
CREAT SERPL-MCNC: 0.93 MG/DL (ref 0.52–1.04)
GFR SERPL CREATININE-BSD FRML MDRD: 75 ML/MIN/{1.73_M2}
GLUCOSE SERPL-MCNC: 90 MG/DL (ref 70–99)
HDLC SERPL-MCNC: 45 MG/DL
LDLC SERPL CALC-MCNC: 152 MG/DL
NONHDLC SERPL-MCNC: 170 MG/DL
POTASSIUM SERPL-SCNC: 3.8 MMOL/L (ref 3.4–5.3)
PROT SERPL-MCNC: 7.3 G/DL (ref 6.8–8.8)
SODIUM SERPL-SCNC: 137 MMOL/L (ref 133–144)
TRIGL SERPL-MCNC: 88 MG/DL

## 2019-02-01 PROCEDURE — 80053 COMPREHEN METABOLIC PANEL: CPT | Performed by: FAMILY MEDICINE

## 2019-02-01 PROCEDURE — 80061 LIPID PANEL: CPT | Performed by: FAMILY MEDICINE

## 2019-02-01 PROCEDURE — 36415 COLL VENOUS BLD VENIPUNCTURE: CPT | Performed by: FAMILY MEDICINE

## 2019-02-18 ENCOUNTER — HOSPITAL ENCOUNTER (OUTPATIENT)
Dept: MAMMOGRAPHY | Facility: CLINIC | Age: 44
Discharge: HOME | End: 2019-02-18
Attending: FAMILY MEDICINE | Admitting: FAMILY MEDICINE
Payer: COMMERCIAL

## 2019-02-18 DIAGNOSIS — Z12.31 VISIT FOR SCREENING MAMMOGRAM: ICD-10-CM

## 2019-02-18 PROCEDURE — 77063 BREAST TOMOSYNTHESIS BI: CPT

## 2019-02-22 ENCOUNTER — OFFICE VISIT (OUTPATIENT)
Dept: FAMILY MEDICINE | Facility: CLINIC | Age: 44
End: 2019-02-22
Payer: COMMERCIAL

## 2019-02-22 VITALS
DIASTOLIC BLOOD PRESSURE: 76 MMHG | SYSTOLIC BLOOD PRESSURE: 122 MMHG | BODY MASS INDEX: 24.63 KG/M2 | TEMPERATURE: 99.1 F | WEIGHT: 139 LBS | HEIGHT: 63 IN | OXYGEN SATURATION: 98 % | HEART RATE: 67 BPM

## 2019-02-22 DIAGNOSIS — L91.8 SKIN TAG: Primary | ICD-10-CM

## 2019-02-22 DIAGNOSIS — F41.1 GAD (GENERALIZED ANXIETY DISORDER): ICD-10-CM

## 2019-02-22 DIAGNOSIS — F41.1 GENERALIZED ANXIETY DISORDER: ICD-10-CM

## 2019-02-22 DIAGNOSIS — G47.09 OTHER INSOMNIA: ICD-10-CM

## 2019-02-22 PROCEDURE — 11200 RMVL SKIN TAGS UP TO&INC 15: CPT | Performed by: FAMILY MEDICINE

## 2019-02-22 PROCEDURE — 99213 OFFICE O/P EST LOW 20 MIN: CPT | Mod: 25 | Performed by: FAMILY MEDICINE

## 2019-02-22 RX ORDER — BUPROPION HYDROCHLORIDE 150 MG/1
150 TABLET ORAL EVERY MORNING
Qty: 30 TABLET | Refills: 1 | Status: SHIPPED | OUTPATIENT
Start: 2019-02-22 | End: 2019-05-01

## 2019-02-22 RX ORDER — TRAZODONE HYDROCHLORIDE 50 MG/1
100 TABLET, FILM COATED ORAL AT BEDTIME
Qty: 180 TABLET | Refills: 1 | Status: SHIPPED | OUTPATIENT
Start: 2019-02-22 | End: 2019-11-18

## 2019-02-22 ASSESSMENT — MIFFLIN-ST. JEOR: SCORE: 1254.63

## 2019-02-22 ASSESSMENT — ANXIETY QUESTIONNAIRES
2. NOT BEING ABLE TO STOP OR CONTROL WORRYING: NOT AT ALL
6. BECOMING EASILY ANNOYED OR IRRITABLE: SEVERAL DAYS
1. FEELING NERVOUS, ANXIOUS, OR ON EDGE: SEVERAL DAYS
IF YOU CHECKED OFF ANY PROBLEMS ON THIS QUESTIONNAIRE, HOW DIFFICULT HAVE THESE PROBLEMS MADE IT FOR YOU TO DO YOUR WORK, TAKE CARE OF THINGS AT HOME, OR GET ALONG WITH OTHER PEOPLE: SOMEWHAT DIFFICULT
7. FEELING AFRAID AS IF SOMETHING AWFUL MIGHT HAPPEN: NOT AT ALL
3. WORRYING TOO MUCH ABOUT DIFFERENT THINGS: NOT AT ALL
5. BEING SO RESTLESS THAT IT IS HARD TO SIT STILL: NOT AT ALL
GAD7 TOTAL SCORE: 3

## 2019-02-22 ASSESSMENT — PATIENT HEALTH QUESTIONNAIRE - PHQ9
5. POOR APPETITE OR OVEREATING: SEVERAL DAYS
SUM OF ALL RESPONSES TO PHQ QUESTIONS 1-9: 1

## 2019-02-22 NOTE — NURSING NOTE
"Chief Complaint   Patient presents with     Derm Problem     skin tag removal on neck and under arms     /76   Pulse 67   Temp 99.1  F (37.3  C) (Oral)   Ht 1.6 m (5' 3\")   Wt 63 kg (139 lb)   LMP 02/01/2019   SpO2 98%   BMI 24.62 kg/m   Estimated body mass index is 24.62 kg/m  as calculated from the following:    Height as of this encounter: 1.6 m (5' 3\").    Weight as of this encounter: 63 kg (139 lb).        Health Maintenance due pending provider review:  NONE    n/a    Lupe Kay CMA  "

## 2019-02-22 NOTE — TELEPHONE ENCOUNTER
"Requested Prescriptions   Pending Prescriptions Disp Refills     buPROPion (WELLBUTRIN XL) 150 MG 24 hr tablet [Pharmacy Med Name: BUPROPION XL 150MG TABLETS (24 H)]  Last Written Prescription Date:  2/22/2019  Last Fill Quantity: 30 tablet,  # refills: 1   Last Office Visit:2/22/2019 Andre  Future Office Visit:      90 tablet 1     Sig: TAKE 1 TABLET(150 MG) BY MOUTH EVERY MORNING    SSRIs Protocol Passed - 2/22/2019  9:58 AM       Passed - Recent (12 mo) or future (30 days) visit within the authorizing provider's specialty    Patient had office visit in the last 12 months or has a visit in the next 30 days with authorizing provider or within the authorizing provider's specialty.  See \"Patient Info\" tab in inbasket, or \"Choose Columns\" in Meds & Orders section of the refill encounter.             Passed - Medication is Bupropion    If the medication is Bupropion (Wellbutrin), and the patient is taking for smoking cessation; OK to refill.         Passed - Medication is active on med list       Passed - Patient is age 18 or older       Passed - No active pregnancy on record       Passed - No positive pregnancy test in last 12 months          "

## 2019-02-22 NOTE — PROGRESS NOTES
"  SUBJECTIVE:   Yoselyn Mcgill is a 43 year old female who presents to clinic today for the following health issues:      Anxiety Follow-Up    Status since last visit: improved    Other associated symptoms:decreased libido \"none\"    Complicating factors:   Significant life event: No   Current substance abuse: None  Depression symptoms: No  YANIRA-7 SCORE 2017   Total Score 2 9 8       YANIRA-7    Amount of exercise or physical activity: 4 x week    Problems taking medications regularly: No    Medication side effects: decreased libido    Diet: regular (no restrictions)-mostly gluten free    Taking the zoloft 25mg daily and wellbutrin SR 100mg    Skin tags -   B/l axilla and on neck -   Would like them removed as they get caught on clothing and necklaces   Also irritated from shaving.    -------------------------------------    Problem list and histories reviewed & adjusted, as indicated.  Additional history: as documented    Patient Active Problem List   Diagnosis     Dysplasia of cervix     CARDIOVASCULAR SCREENING; LDL GOAL LESS THAN 160     Family history of malignant neoplasm of breast     YANIRA (generalized anxiety disorder)     S/P  section     Family history of hemochromatosis     Hereditary hemochromatosis (H)     Past Surgical History:   Procedure Laterality Date     ANKLE SURGERY  08/10/11      SECTION N/A 2015    Procedure:  SECTION;  Surgeon: Emma Kendall MD;  Location:  L+D     ELECTROCONVULSIVE THERAPY  -    [facility]       Social History     Tobacco Use     Smoking status: Never Smoker     Smokeless tobacco: Never Used   Substance Use Topics     Alcohol use: Yes     Alcohol/week: 0.0 oz     Comment: socially     Family History   Problem Relation Age of Onset     C.A.D. Mother      Hypertension Mother      Breast Cancer Mother      C.A.D. Maternal Grandfather      Hypertension Maternal Grandfather      Alcohol/Drug Maternal " "Grandfather      C.A.D. Paternal Grandfather      Depression Paternal Grandmother            Reviewed and updated as needed this visit by clinical staff  Tobacco  Allergies  Meds  Med Hx  Surg Hx  Fam Hx  Soc Hx      Reviewed and updated as needed this visit by Provider         ROS:  Constitutional, HEENT, cardiovascular, pulmonary, GI, , musculoskeletal, neuro, skin, endocrine and psych systems are negative, except as otherwise noted.    OBJECTIVE:     /76   Pulse 67   Temp 99.1  F (37.3  C) (Oral)   Ht 1.6 m (5' 3\")   Wt 63 kg (139 lb)   LMP 02/01/2019   SpO2 98%   BMI 24.62 kg/m    Body mass index is 24.62 kg/m .  GENERAL: healthy, alert and no distress  SKIN: multiple skin tags in the b/l axilla and neck  The raised lesions were cleaned with alcohol wipe and using curves scissor they were lifted and removed  - approx 8 lesions.  She had a few flatter smaller lesions and a 7 were treated with 3 freeze thaw cycles of liquid nitrogen  PSYCH: mentation appears normal, affect normal/bright    Diagnostic Test Results:  none     ASSESSMENT/PLAN:     1. YANIRA (generalized anxiety disorder)  Anxiety is significantly improved  Still having libido issues -   Will switch to the long acting wellbutrin XL 150mg and see how she does  If stable can call for 3 month refills  If any issues can do evisit or telephone visit.  - buPROPion (WELLBUTRIN XL) 150 MG 24 hr tablet; Take 1 tablet (150 mg) by mouth every morning  Dispense: 30 tablet; Refill: 1  - sertraline (ZOLOFT) 50 MG tablet; Take 0.5 tablets (25 mg) by mouth daily  Dispense: 45 tablet; Refill: 1    2. Other insomnia  Refilled   - traZODone (DESYREL) 50 MG tablet; Take 2 tablets (100 mg) by mouth At Bedtime  Dispense: 180 tablet; Refill: 1    3. Skin tag  As above  - REMOVAL OF SKIN TAGS, FIRST 15    Pt will call or RTC if symptoms worsen or do not improve.      Elisabeth Powell, DO  [facility]    "

## 2019-02-23 ASSESSMENT — ANXIETY QUESTIONNAIRES: GAD7 TOTAL SCORE: 3

## 2019-02-25 RX ORDER — BUPROPION HYDROCHLORIDE 150 MG/1
TABLET ORAL
Start: 2019-02-25

## 2019-02-25 NOTE — TELEPHONE ENCOUNTER
Rx sent 2/22/2019 #30 with 1 refill  Pharmacy requesting 90 day  Denied  90 day not appropriate - new medication   Isabella PANCHAL RN

## 2019-03-25 ENCOUNTER — MYC MEDICAL ADVICE (OUTPATIENT)
Dept: FAMILY MEDICINE | Facility: CLINIC | Age: 44
End: 2019-03-25

## 2019-03-25 NOTE — TELEPHONE ENCOUNTER
Apt advised.  Not mention of OCP discussion in recent visit and last PE was 8/2018.  Tori Edwards RN

## 2019-05-01 ENCOUNTER — OFFICE VISIT (OUTPATIENT)
Dept: FAMILY MEDICINE | Facility: CLINIC | Age: 44
End: 2019-05-01
Payer: COMMERCIAL

## 2019-05-01 VITALS
HEART RATE: 67 BPM | WEIGHT: 137.1 LBS | HEIGHT: 63 IN | DIASTOLIC BLOOD PRESSURE: 81 MMHG | OXYGEN SATURATION: 98 % | SYSTOLIC BLOOD PRESSURE: 127 MMHG | TEMPERATURE: 98.9 F | BODY MASS INDEX: 24.29 KG/M2

## 2019-05-01 DIAGNOSIS — F41.1 GAD (GENERALIZED ANXIETY DISORDER): ICD-10-CM

## 2019-05-01 DIAGNOSIS — N93.8 DUB (DYSFUNCTIONAL UTERINE BLEEDING): Primary | ICD-10-CM

## 2019-05-01 LAB
ERYTHROCYTE [DISTWIDTH] IN BLOOD BY AUTOMATED COUNT: 12.2 % (ref 10–15)
HCT VFR BLD AUTO: 39.9 % (ref 35–47)
HGB BLD-MCNC: 13.3 G/DL (ref 11.7–15.7)
MCH RBC QN AUTO: 31.4 PG (ref 26.5–33)
MCHC RBC AUTO-ENTMCNC: 33.3 G/DL (ref 31.5–36.5)
MCV RBC AUTO: 94 FL (ref 78–100)
PLATELET # BLD AUTO: 270 10E9/L (ref 150–450)
RBC # BLD AUTO: 4.24 10E12/L (ref 3.8–5.2)
WBC # BLD AUTO: 7.7 10E9/L (ref 4–11)

## 2019-05-01 PROCEDURE — 84443 ASSAY THYROID STIM HORMONE: CPT | Performed by: FAMILY MEDICINE

## 2019-05-01 PROCEDURE — 99214 OFFICE O/P EST MOD 30 MIN: CPT | Performed by: FAMILY MEDICINE

## 2019-05-01 PROCEDURE — 85027 COMPLETE CBC AUTOMATED: CPT | Performed by: FAMILY MEDICINE

## 2019-05-01 PROCEDURE — 36415 COLL VENOUS BLD VENIPUNCTURE: CPT | Performed by: FAMILY MEDICINE

## 2019-05-01 RX ORDER — NORGESTIMATE AND ETHINYL ESTRADIOL 7DAYSX3 28
1 KIT ORAL DAILY
Qty: 84 TABLET | Refills: 1 | Status: SHIPPED | OUTPATIENT
Start: 2019-05-01 | End: 2019-12-18 | Stop reason: SINTOL

## 2019-05-01 RX ORDER — BUPROPION HYDROCHLORIDE 100 MG/1
100 TABLET, EXTENDED RELEASE ORAL DAILY
Qty: 90 TABLET | Refills: 1 | Status: SHIPPED | OUTPATIENT
Start: 2019-05-01 | End: 2019-10-12

## 2019-05-01 ASSESSMENT — ANXIETY QUESTIONNAIRES
5. BEING SO RESTLESS THAT IT IS HARD TO SIT STILL: NOT AT ALL
6. BECOMING EASILY ANNOYED OR IRRITABLE: NOT AT ALL
7. FEELING AFRAID AS IF SOMETHING AWFUL MIGHT HAPPEN: NOT AT ALL
IF YOU CHECKED OFF ANY PROBLEMS ON THIS QUESTIONNAIRE, HOW DIFFICULT HAVE THESE PROBLEMS MADE IT FOR YOU TO DO YOUR WORK, TAKE CARE OF THINGS AT HOME, OR GET ALONG WITH OTHER PEOPLE: NOT DIFFICULT AT ALL
1. FEELING NERVOUS, ANXIOUS, OR ON EDGE: SEVERAL DAYS
3. WORRYING TOO MUCH ABOUT DIFFERENT THINGS: NOT AT ALL
GAD7 TOTAL SCORE: 1
2. NOT BEING ABLE TO STOP OR CONTROL WORRYING: NOT AT ALL

## 2019-05-01 ASSESSMENT — PATIENT HEALTH QUESTIONNAIRE - PHQ9
SUM OF ALL RESPONSES TO PHQ QUESTIONS 1-9: 0
5. POOR APPETITE OR OVEREATING: NOT AT ALL

## 2019-05-01 ASSESSMENT — MIFFLIN-ST. JEOR: SCORE: 1246.01

## 2019-05-01 NOTE — PROGRESS NOTES
SUBJECTIVE:   Yoselyn Mcgill is a 43 year old female who presents to clinic today for the following   health issues:      Chief Complaint   Patient presents with     Anxiety     Contraception     would like to discuss going back on oral contraceptives due to heavy periods and cramping       Anxiety Follow-Up    Status since last visit: Improved , prefers 100mg instead of 150mg     Other associated symptoms:None    Complicating factors:   Significant life event: No   Current substance abuse: None  Depression symptoms: No  YANIRA-7 SCORE 2019   Total Score 8 3 1       YANIRA-7    Amount of exercise or physical activity: 4-5 days/week for an average of 30-45 minutes    Problems taking medications regularly: No    Medication side effects: none    Diet: regular (no restrictions)    Has been off OCP for 2 years  May 2015 - went on progesterone only after birth of daughter  Cycles are shorter -   Feels like loosing one week to the menses per month -over the last 6-8 months  With bleeding - will have to change super tampons every 1.5-2 hours      -------------------------------------    Additional history: as documented    Reviewed  and updated as needed this visit by clinical staff  Tobacco  Allergies  Meds  Problems  Med Hx  Surg Hx  Fam Hx         Reviewed and updated as needed this visit by Provider  Tobacco  Allergies  Meds  Problems  Med Hx  Surg Hx  Fam Hx         Patient Active Problem List   Diagnosis     Dysplasia of cervix     CARDIOVASCULAR SCREENING; LDL GOAL LESS THAN 160     Family history of malignant neoplasm of breast     YANIRA (generalized anxiety disorder)     Family history of hemochromatosis     Hereditary hemochromatosis (H)     Past Surgical History:   Procedure Laterality Date     ANKLE SURGERY  08/10/11      SECTION N/A 2015    Procedure:  SECTION;  Surgeon: Emma Kendall MD;  [facility]     ELECTROCONVULSIVE THERAPY   "01/08-06/09    [facility]       Social History     Tobacco Use     Smoking status: Never Smoker     Smokeless tobacco: Never Used   Substance Use Topics     Alcohol use: Yes     Alcohol/week: 0.0 oz     Comment: socially     Family History   Problem Relation Age of Onset     C.A.D. Mother      Hypertension Mother      Breast Cancer Mother      C.A.D. Maternal Grandfather      Hypertension Maternal Grandfather      Alcohol/Drug Maternal Grandfather      C.A.D. Paternal Grandfather      Depression Paternal Grandmother            ROS:  Constitutional, HEENT, cardiovascular, pulmonary, GI, , musculoskeletal, neuro, skin, endocrine and psych systems are negative, except as otherwise noted.    OBJECTIVE:     /81   Pulse 67   Temp 98.9  F (37.2  C) (Oral)   Ht 1.6 m (5' 3\")   Wt 62.2 kg (137 lb 1.6 oz)   LMP 04/22/2019 (Approximate)   SpO2 98%   BMI 24.29 kg/m    Body mass index is 24.29 kg/m .  GENERAL: healthy, alert and no distress    Diagnostic Test Results:  Results for orders placed or performed in visit on 05/01/19 (from the past 24 hour(s))   CBC with platelets   Result Value Ref Range    WBC 7.7 4.0 - 11.0 10e9/L    RBC Count 4.24 3.8 - 5.2 10e12/L    Hemoglobin 13.3 11.7 - 15.7 g/dL    Hematocrit 39.9 35.0 - 47.0 %    MCV 94 78 - 100 fl    MCH 31.4 26.5 - 33.0 pg    MCHC 33.3 31.5 - 36.5 g/dL    RDW 12.2 10.0 - 15.0 %    Platelet Count 270 150 - 450 10e9/L     Additional lab pending    ASSESSMENT/PLAN:     1. DUB (dysfunctional uterine bleeding)  Heavy bleeding with more symptoms monthly  Will check some labs and pelvic ultrasound to r/o fibroid or other like a polyp  Plan to try OCP -   - CBC with platelets  - TSH with free T4 reflex  - US Pelvic Complete w Transvaginal; Future  - norgestim-eth estrad triphasic (ORTHO TRI-CYCLEN/TRI-SPRINTEC) 0.18/0.215/0.25 MG-35 MCG tablet; Take 1 tablet by mouth daily  Dispense: 84 tablet; Refill: 1    2. YANIRA (generalized anxiety disorder)     - buPROPion " (WELLBUTRIN SR) 100 MG 12 hr tablet; Take 1 tablet (100 mg) by mouth daily  Dispense: 90 tablet; Refill: 1    Pt will call or RTC if symptoms worsen or do not improve.      Elisabeth Powell, [facility]

## 2019-05-01 NOTE — NURSING NOTE
"Chief Complaint   Patient presents with     Anxiety     Contraception     would like to discuss going back on oral contraceptives due to heavy periods and cramping      /81   Pulse 67   Temp 98.9  F (37.2  C) (Oral)   Ht 1.6 m (5' 3\")   Wt 62.2 kg (137 lb 1.6 oz)   LMP 04/22/2019 (Approximate)   SpO2 98%   BMI 24.29 kg/m   Estimated body mass index is 24.29 kg/m  as calculated from the following:    Height as of this encounter: 1.6 m (5' 3\").    Weight as of this encounter: 62.2 kg (137 lb 1.6 oz).  Medication Reconciliation: complete      Health Maintenance that is potentially due pending provider review:  NONE    n/a    ALVIN Milian  "

## 2019-05-02 LAB — TSH SERPL DL<=0.005 MIU/L-ACNC: 0.76 MU/L (ref 0.4–4)

## 2019-05-02 ASSESSMENT — ANXIETY QUESTIONNAIRES: GAD7 TOTAL SCORE: 1

## 2019-05-07 NOTE — RESULT ENCOUNTER NOTE
Dear Yoselyn,   Your test results are all back -   -All of your labs are normal.  Let us know if you have any questions.  -Elisabeth Powell, DO

## 2019-05-29 ENCOUNTER — ANCILLARY PROCEDURE (OUTPATIENT)
Dept: ULTRASOUND IMAGING | Facility: CLINIC | Age: 44
End: 2019-05-29
Attending: FAMILY MEDICINE
Payer: COMMERCIAL

## 2019-05-29 DIAGNOSIS — N93.8 DUB (DYSFUNCTIONAL UTERINE BLEEDING): ICD-10-CM

## 2019-05-29 PROCEDURE — 76830 TRANSVAGINAL US NON-OB: CPT | Performed by: OBSTETRICS & GYNECOLOGY

## 2019-05-31 ENCOUNTER — TELEPHONE (OUTPATIENT)
Dept: FAMILY MEDICINE | Facility: CLINIC | Age: 44
End: 2019-05-31

## 2019-05-31 DIAGNOSIS — N93.8 DUB (DYSFUNCTIONAL UTERINE BLEEDING): Primary | ICD-10-CM

## 2019-05-31 NOTE — RESULT ENCOUNTER NOTE
Dear Yoselyn,   Your test results are all back -   Here is the ultrasound per our discussion at your daughter's visit.  We will see what gynecology recommends.  Let us know if you have any questions.  -Elisabeth Powell, DO

## 2019-06-04 ENCOUNTER — OFFICE VISIT (OUTPATIENT)
Dept: OBGYN | Facility: CLINIC | Age: 44
End: 2019-06-04
Payer: COMMERCIAL

## 2019-06-04 VITALS
HEIGHT: 63 IN | BODY MASS INDEX: 23.57 KG/M2 | SYSTOLIC BLOOD PRESSURE: 138 MMHG | WEIGHT: 133 LBS | TEMPERATURE: 99.3 F | DIASTOLIC BLOOD PRESSURE: 70 MMHG

## 2019-06-04 DIAGNOSIS — N93.9 ABNORMAL UTERINE BLEEDING: Primary | ICD-10-CM

## 2019-06-04 LAB — HCG UR QL: NEGATIVE

## 2019-06-04 PROCEDURE — 99203 OFFICE O/P NEW LOW 30 MIN: CPT | Performed by: OBSTETRICS & GYNECOLOGY

## 2019-06-04 PROCEDURE — 81025 URINE PREGNANCY TEST: CPT | Performed by: OBSTETRICS & GYNECOLOGY

## 2019-06-04 RX ORDER — NORGESTIMATE AND ETHINYL ESTRADIOL 0.25-0.035
1 KIT ORAL DAILY
Qty: 112 TABLET | Refills: 3 | Status: SHIPPED | OUTPATIENT
Start: 2019-06-04 | End: 2019-12-18 | Stop reason: SINTOL

## 2019-06-04 SDOH — HEALTH STABILITY: MENTAL HEALTH: HOW MANY STANDARD DRINKS CONTAINING ALCOHOL DO YOU HAVE ON A TYPICAL DAY?: 1 OR 2

## 2019-06-04 SDOH — HEALTH STABILITY: MENTAL HEALTH: HOW OFTEN DO YOU HAVE A DRINK CONTAINING ALCOHOL?: MONTHLY OR LESS

## 2019-06-04 SDOH — HEALTH STABILITY: MENTAL HEALTH: HOW OFTEN DO YOU HAVE 6 OR MORE DRINKS ON ONE OCCASION?: NEVER

## 2019-06-04 ASSESSMENT — MIFFLIN-ST. JEOR: SCORE: 1227.41

## 2019-06-04 NOTE — PATIENT INSTRUCTIONS
Untangled book for you--daughter    Take rest of this pack as prescribed and if doing well, can start active tablets of next pack (skip white pills)    The first year you do something continuous (pill, patch or ring) you will have the same number of bleeding days as if you had taken a week off each month, but the bleeding days will be at random times.   As long as you are okay with that, you can start trying continuous therapy.    There are 2 ways to take the pill/patch/ring continuously:  1) take active tablets for 3 weeks, or patches for 3 weeks or ring for 3 weeks, then instead of one week off do another 1-2 months of active pill/patch or ring.   (ie you will be on active hormones for 6 or 9 weeks and then do one week off so skipping periods)     2) you can take active tablets/patch or ring until you have red bleeding, then take 4-7 days off of hormone and restart.   Bleeding will happen at random times.    You never want to go more than 7 days off a hormonal therapy or you can get pregnant, so just make sure NO MORE THAN 7 DAYS OFF.  :)

## 2019-06-04 NOTE — Clinical Note
Salo Branch,  Sorry I didn't close her chart before now. ;) Thanks for referral and I think her ultrasound is probably nothing.  Agree with your OCP start.  I just switched her to a monophasic so that she can pill stack if desires. Elizabeth

## 2019-06-04 NOTE — NURSING NOTE
"Chief Complaint   Patient presents with     Ultrasound     f/u       Initial /70   Temp 99.3  F (37.4  C) (Oral)   Ht 1.6 m (5' 3\")   Wt 60.3 kg (133 lb)   LMP 2019   BMI 23.56 kg/m   Estimated body mass index is 23.56 kg/m  as calculated from the following:    Height as of this encounter: 1.6 m (5' 3\").    Weight as of this encounter: 60.3 kg (133 lb).  BP completed using cuff size: regular    Questioned patient about current smoking habits.  Pt. has never smoked.          The following HM Due: NONE      The following patient reported/Care Every where data was sent to:  P ABSTRACT QUALITY INITIATIVES [[id number]]  na      patient has appointment for today              "

## 2019-06-09 NOTE — PROGRESS NOTES
I was asked to see patient Yoselyn Mcgill is a 43 year old female regarding ultrasound findings by Elisabeth Powell      HPI:  Yoselyn Mcgill is a 43 year old female is a   .  Patient's last menstrual period was 2019.  Menses are regular q 23-24 days.  Cycles are heavier changing super tampon every 2 hours.  Went to an acupuncturist which help extend cycles 28 days.    States for approximately 1 week/month she is very tired and cranky.  Feels like she loses this week each month and is been this way for approximately half a year.    Patients records are available and reviewed at today's visit. Still images of ultrasound were reviewed with the patient.  Uterus: anteverted  Size: 7.5 x 4.0 x 3.3cm  Findings: irregular area in anterior myometrium with blood flow on color Doppler, focal adenomyosis appearance   Endometrium: Thickness total 8.1mm  Right Ovary: 2.6 x 1.5 x 1.3cm   Left Ovary: 2.5 x 2.1 x 1.7cm  Cul de Sac/Pouch of Aayush: no free fluid    PCP started her on a triphasic OCP and has taken 2 weeks and feeling well.    Last TSH: , normal?  Yes    Past Medical History:   Diagnosis Date     Anxiety state, unspecified     ECT [id number], multiple meds, off for 2 yrs.       HPV (human papilloma virus) infection        Past Surgical History:   Procedure Laterality Date     ANKLE SURGERY  08/10/11      SECTION N/A 2015    Procedure:  SECTION;  Surgeon: Emma Kendall MD;  Location:  [facility]     ELECTROCONVULS[facility]ERAPY  -    [facility]       Family History   Problem Relation Age of Onset     C.A.D. Mother      Hypertension Mother      Breast Cancer Mother      C.A.D. Maternal Grandfather      Hypertension Maternal Grandfather      Alcohol/Drug Maternal Grandfather      C.A.D. Paternal Grandfather      Depression Paternal Grandmother        Social History     Socioeconomic History     Marital status: Single     Spouse name: None     Number of children:  0     Years of education: None     Highest education level: None   Occupational History     Employer: UNEMPLOYED   Social Needs     Financial resource strain: None     Food insecurity:     Worry: None     Inability: None     Transportation needs:     Medical: None     Non-medical: None   Tobacco Use     Smoking status: Never Smoker     Smokeless tobacco: Never Used   Substance and Sexual Activity     Alcohol use: Yes     Alcohol/week: 0.0 oz     Frequency: Monthly or less     Drinks per session: 1 or 2     Binge frequency: Never     Comment: socially     Drug use: No     Sexual activity: Yes     Partners: Male     Birth control/protection: Pill   Lifestyle     Physical activity:     Days per week: None     Minutes per session: None     Stress: None   Relationships     Social connections:     Talks on phone: None     Gets together: None     Attends Restoration service: None     Active member of club or organization: None     Attends meetings of clubs or organizations: None     Relationship status: None     Intimate partner violence:     Fear of current or ex partner: None     Emotionally abused: None     Physically abused: None     Forced sexual activity: None   Other Topics Concern     Parent/sibling w/ CABG, MI or angioplasty before 65F 55M? Not Asked   Social History Narrative     None       Allergies: Amoxicillin    Current Outpatient Medications   Medication Sig Dispense Refill     norgestimate-ethinyl estradiol (SPRINTEC 28) 0.25-35 MG-MCG tablet Take 1 tablet by mouth daily Active tablets only for prevention of menses 112 tablet 3     buPROPion (WELLBUTRIN SR) 100 MG 12 hr tablet Take 1 tablet (100 mg) by mouth daily 90 tablet 1     clindamycin (CLEOCIN T) 1 % lotion APPLY EXTERNALLY TO THE AFFECTED AREA TWICE DAILY 60 mL 2     LORazepam (ATIVAN) 0.5 MG tablet Take 1 tablet (0.5 mg) by mouth every 8 hours as needed for anxiety 20 tablet 0     norgestim-eth estrad triphasic (ORTHO TRI-CYCLEN/TRI-SPRINTEC)  "0.18/0.215/0.25 MG-35 MCG tablet Take 1 tablet by mouth daily 84 tablet 1     Omega-3 Fatty Acids (OMEGA-3 FISH OIL PO)        Probiotic Product (PRO-BIOTIC BLEND PO)        sertraline (ZOLOFT) 50 MG tablet Take 0.5 tablets (25 mg) by mouth daily 45 tablet 1     traZODone (DESYREL) 50 MG tablet Take 2 tablets (100 mg) by mouth At Bedtime 180 tablet 1     VITAMIN D, CHOLECALCIFEROL, PO Take by mouth daily         ROS:  C: NEGATIVE for fever, chills, change in weight  R: NEGATIVE for significant cough or SOB  CV: NEGATIVE for chest pain, palpitations or peripheral edema  GI: NEGATIVE for nausea, abdominal pain, heartburn, or change in bowel habits  : NEGATIVE for frequency, dysuria, hematuria, vaginal discharge  P: NEGATIVE for changes in mood or affect    EXAM:  Blood pressure 138/70, temperature 99.3  F (37.4  C), temperature source Oral, height 1.6 m (5' 3\"), weight 60.3 kg (133 lb), last menstrual period 05/19/2019, not currently breastfeeding.  BMI= Body mass index is 23.56 kg/m .  Patient's last menstrual period was 05/19/2019.  General - pleasant female in no acute distress.  Neurological - alert and oriented X 3  Psychiatric - normal mood and affect    No other physical examination was performed today as we spent over 50% of today's 30 minute visit in face-to-face discussion and counseling about perimenopause and menorrhagia.    ASSESSMENT/PLAN:  (N93.9) Abnormal uterine bleeding  (primary encounter diagnosis)  Comment: on trisprintec  Plan: HCG qualitative urine, norgestimate-ethinyl         estradiol (SPRINTEC 28) 0.25-35 MG-MCG tablet        Discussed ultrasound is very nonspecific and she could have adenomyosis.  We could do a sonohysterogram to see if this area is in the endometrium versus myometrium but since she is not having any midcycle bleeding, discussed we do not need to pursue that route.  Reviewed all dysmenorrhea and abnormal bleeding can be treated with OCP or IUD typically.  Discussed " perimenopause symptoms and that OCP usually alleviate those.    Recommend changing to a monophasic OCP so that she can do extended cycles and new prescription was done.  Questions were answered and she will contact me if having any issues.  We also discussed possibility of Mirena IUD if needed.        A copy of the chart will be routed to the referring provider.    ELIZABETH HUDSON

## 2019-10-03 ENCOUNTER — NURSE TRIAGE (OUTPATIENT)
Dept: NURSING | Facility: CLINIC | Age: 44
End: 2019-10-03

## 2019-10-03 NOTE — TELEPHONE ENCOUNTER
Caller states she is having diarrhea stools 3-4 within the last 24 hours. Caller states her daughter was exposed to a bacteria at the [email or web address] about 5 days ago, and had the diarrhea symptoms as well. Caller denies any vomiting or fever. Triage guidelines recommend to home care. Caller verbalized and understands directives.    Additional Information    Negative: Shock suspected (e.g., cold/pale/clammy skin, too weak to stand, low BP, rapid pulse)    Negative: Difficult to awaken or acting confused (e.g., disoriented, slurred speech)    Negative: Sounds like a life-threatening emergency to the triager    Negative: Vomiting also present and worse than the diarrhea    Negative: [1] Blood in stool AND [2] without diarrhea    Negative: Diarrhea in a cancer patient who is currently (or recently) receiving chemotherapy or radiation therapy, or cancer patient who has metastatic or end-stage cancer and is receiving palliative care    Negative: [1] SEVERE abdominal pain (e.g., excruciating) AND [2] present > 1 hour    Negative: [1] SEVERE abdominal pain AND [2] age > 60    Negative: [1] Blood in the stool AND [2] moderate or large amount of blood    Negative: Black or tarry bowel movements  (Exception: chronic-unchanged  black-grey bowel movements AND is taking iron pills or Pepto-bismol)    Negative: [1] Drinking very little AND [2] dehydration suspected (e.g., no urine > 12 hours, very dry mouth, very lightheaded)    Negative: Patient sounds very sick or weak to the triager    Negative: [1] SEVERE diarrhea (e.g., 7 or more times / day more than normal) AND [2]  age > 60 years    Negative: [1] Constant abdominal pain AND [2] present > 2 hours    Negative: [1] Fever > 103 F (39.4 C) AND [2] not able to get the fever down using Fever Care Advice    Negative: [1] SEVERE diarrhea (e.g., 7 or more times / day more than normal) AND [2] present > 24 hours (1 day)    Negative: [1] MODERATE diarrhea (e.g., 4-6 times / day more  than normal) AND [2] present > 48 hours (2 days)    Negative: [1] MODERATE diarrhea (e.g., 4-6 times / day more than normal) AND [2] age > 70 years    Negative: Fever > 101 F (38.3 C)    Negative: Fever present > 3 days (72 hours)    Negative: Abdominal pain  (Exception: Pain clears with each passage of diarrhea stool)    Negative: [1] Blood in the stool AND [2] small amount of blood   (Exception: only on toilet paper. Reason: diarrhea can cause rectal irritation with blood on wiping)    Negative: [1] Mucus or pus in stool AND [2] present > 2 days AND [3] diarrhea is more than mild    Negative: [1] Recent antibiotic therapy (i.e., within last 2 months) AND [2] diarrhea present > 3 days since antibiotic was stopped    Negative: [1] Recent hospitalization AND [2] diarrhea present > 3 days    Negative: Weak immune system (e.g., HIV positive, cancer chemo, splenectomy, organ transplant, chronic steroids)    Negative: Tube feedings (e.g., nasogastric, g-tube, j-tube)    Negative: Travel to a foreign country in past month    Negative: [1] MILD diarrhea (e.g., 1-3 or more stools than normal in past 24 hours) without known cause AND [2] present >  7 days    Negative: Diarrhea is a chronic symptom (recurrent or ongoing AND present > 4 weeks)    Negative: SEVERE diarrhea (e.g., 7 or more times / day more than normal)    MILD-MODERATE diarrhea (e.g., 1-6 times / day more than normal)    Protocols used: DIARRHEA-A-AH

## 2019-10-04 ENCOUNTER — TELEPHONE (OUTPATIENT)
Dept: FAMILY MEDICINE | Facility: CLINIC | Age: 44
End: 2019-10-04

## 2019-10-04 NOTE — TELEPHONE ENCOUNTER
PN,    Pt thinks she was exposed to cryptosporidium from local pool in [facility]. Daughter she believes had it and passed it to her.    Pt had diarrhea 4-5 times yesterday and took imodium 4-5 times.   Had an episode of  explosive diarrhea this am per pt.  Had temps up to 103 tympanic, then 101 and currently 99.  Took ibuprofen this am and tylenol a few min ago.  Stomach is upset, has HA and body aches.    Pt just checking in.  Advised lots of hydration and bland starch diet.    Ok to continue using imodium and ok to monitor as long as temps not increasing or not going down with antipyretics?   Jassi back on Monday if temps continue ing to be high?    Please advise.  Thanks,  Tori Edwards RN

## 2019-10-04 NOTE — TELEPHONE ENCOUNTER
PN,  Informed pt ok to monitor and as long as no high temps or worsening  Symptoms.   Advised being seen if worsening or high temps.  Advised calling over the weekend if  questions/concerns.  Informed her she can take imodium if she wants and recommended probiotics.  She says upper GI hurting now and wanting to know if she can tale peptobismaul?  Please advise.  Thanks,  Tori Edwards RN

## 2019-10-04 NOTE — TELEPHONE ENCOUNTER
Reason for call:  Patient reporting a symptom    Symptom or request: Pt called spoke with nurse yesterday regarding being exposed to bacteria at [employer]. Pt now has a fever of 103.0    Duration (how long have symptoms been present): about a day     Have you been treated for this before? Triaged     Additional comments: Pt would like to speak with nurse     Phone Number patient can be reached at:  Home number on file [phone] (home)    Best Time:  Anytime     Can we leave a detailed message on this number:  YES    Call taken on 10/4/2019 at 1:26 PM by Sanket Contreras

## 2019-10-04 NOTE — TELEPHONE ENCOUNTER
She can take pepto bismal   If worsening she should go in over the weekend -   Not convinced this is the exact cause - would hate to miss something else  Thanks  PN

## 2019-10-07 NOTE — TELEPHONE ENCOUNTER
"Informed pt below.  Pt says she is feeling better today and thinks it is \"subsiding.\"  Last episode of diarrhea was on Thursday but felt \"pretty miserable over the weekend, \"had very very low energy.\"  Last evening ate a normal meal and today ate breakfast and heading to work.    Encouraged calling if not continuing to get better or and questions/concerns.    Tori Edwards RN    "

## 2019-10-12 DIAGNOSIS — F41.1 GAD (GENERALIZED ANXIETY DISORDER): ICD-10-CM

## 2019-10-12 NOTE — TELEPHONE ENCOUNTER
"Bupropion 100MG   Last Written Prescription Date:  05/01/2019  Last Fill Quantity: 90,  # refills: 1   Last office visit: 5/1/2019 with prescribing provider:  Yes    Future Office Visit:      Requested Prescriptions   Pending Prescriptions Disp Refills     buPROPion (WELLBUTRIN SR) 100 MG 12 hr tablet [Pharmacy Med Name: BUPROPION SR 100MG TABLETS (12H)] 90 tablet 0     Sig: TAKE 1 TABLET(100 MG) BY MOUTH DAILY       SSRIs Protocol Passed - 10/12/2019  9:45 AM        Passed - Recent (12 mo) or future (30 days) visit within the authorizing provider's specialty     Patient has had an office visit with the authorizing provider or a provider within the authorizing providers department within the previous 12 mos or has a future within next 30 days. See \"Patient Info\" tab in inbasket, or \"Choose Columns\" in Meds & Orders section of the refill encounter.              Passed - Medication is Bupropion     If the medication is Bupropion (Wellbutrin), and the patient is taking for smoking cessation; OK to refill.          Passed - Medication is active on med list        Passed - Patient is age 18 or older        Passed - No active pregnancy on record        Passed - No positive pregnancy test in last 12 months          "

## 2019-10-14 DIAGNOSIS — F41.1 GAD (GENERALIZED ANXIETY DISORDER): ICD-10-CM

## 2019-10-14 RX ORDER — BUPROPION HYDROCHLORIDE 100 MG/1
TABLET, EXTENDED RELEASE ORAL
Start: 2019-10-14

## 2019-10-14 RX ORDER — BUPROPION HYDROCHLORIDE 100 MG/1
TABLET, EXTENDED RELEASE ORAL
Qty: 30 TABLET | Refills: 0 | Status: SHIPPED | OUTPATIENT
Start: 2019-10-14 | End: 2019-12-18

## 2019-10-14 NOTE — TELEPHONE ENCOUNTER
"BUPROPION  Last Written Prescription Date:  10/14/19  Last Fill Quantity: 30,  # refills: 0   Last office visit: 5/1/2019 with prescribing provider:  YES   Future Office Visit:      Requested Prescriptions   Pending Prescriptions Disp Refills     buPROPion (WELLBUTRIN SR) 100 MG 12 hr tablet [Pharmacy Med Name: BUPROPION SR 100MG TABLETS (12 H)] 90 tablet 0     Sig: TAKE 1 TABLET BY MOUTH DAILY       SSRIs Protocol Passed - 10/14/2019 10:28 AM        Passed - Recent (12 mo) or future (30 days) visit within the authorizing provider's specialty     Patient has had an office visit with the authorizing provider or a provider within the authorizing providers department within the previous 12 mos or has a future within next 30 days. See \"Patient Info\" tab in inbasket, or \"Choose Columns\" in Meds & Orders section of the refill encounter.              Passed - Medication is Bupropion     If the medication is Bupropion (Wellbutrin), and the patient is taking for smoking cessation; OK to refill.          Passed - Medication is active on med list        Passed - Patient is age 18 or older        Passed - No active pregnancy on record        Passed - No positive pregnancy test in last 12 months        "

## 2019-10-14 NOTE — TELEPHONE ENCOUNTER
Medication is being filled for 1 time refill only due to:  Patient needs to be seen because due for physical.   Isabella PANCHAL RN

## 2019-10-14 NOTE — TELEPHONE ENCOUNTER
Pharmacy requesting 90 day  Denied  90 day not appropriate - due for physical  Isabella PANCHAL RN

## 2019-11-08 ENCOUNTER — HEALTH MAINTENANCE LETTER (OUTPATIENT)
Age: 44
End: 2019-11-08

## 2019-11-10 DIAGNOSIS — F41.1 GAD (GENERALIZED ANXIETY DISORDER): ICD-10-CM

## 2019-11-11 DIAGNOSIS — F41.1 GAD (GENERALIZED ANXIETY DISORDER): ICD-10-CM

## 2019-11-11 NOTE — TELEPHONE ENCOUNTER
"Medication is being filled for 1 time refill only due to:  Patient needs to be seen because due for physical.   Isabella PANCHAL RN    Last Written Prescription Date:  2/22/2019  Last Fill Quantity: 45,  # refills: 1   Last office visit: 5/1/2019 with prescribing provider:     Future Office Visit:    Requested Prescriptions   Pending Prescriptions Disp Refills     sertraline (ZOLOFT) 50 MG tablet [Pharmacy Med Name: SERTRALINE 50MG TABLETS] 45 tablet 0     Sig: TAKE 1/2 TABLET(25 MG) BY MOUTH DAILY       SSRIs Protocol Passed - 11/10/2019  3:43 PM        Passed - Recent (12 mo) or future (30 days) visit within the authorizing provider's specialty     Patient has had an office visit with the authorizing provider or a provider within the authorizing providers department within the previous 12 mos or has a future within next 30 days. See \"Patient Info\" tab in inbasket, or \"Choose Columns\" in Meds & Orders section of the refill encounter.              Passed - Medication is active on med list        Passed - Patient is age 18 or older        Passed - No active pregnancy on record        Passed - No positive pregnancy test in last 12 months        "

## 2019-11-11 NOTE — TELEPHONE ENCOUNTER
"Denied  90 day not appropriate - due for physical  Isabella PACNHAL RN    Last Written Prescription Date:  11/11/2019  Last Fill Quantity: 15,  # refills: 0   Last office visit: 5/1/2019 with prescribing provider:     Future Office Visit:    Requested Prescriptions   Pending Prescriptions Disp Refills     sertraline (ZOLOFT) 50 MG tablet [Pharmacy Med Name: SERTRALINE 50MG TABLETS] 45 tablet 0     Sig: TAKE 1/2 TABLET BY MOUTH DAILY       SSRIs Protocol Passed - 11/11/2019 12:28 PM        Passed - Recent (12 mo) or future (30 days) visit within the authorizing provider's specialty     Patient has had an office visit with the authorizing provider or a provider within the authorizing providers department within the previous 12 mos or has a future within next 30 days. See \"Patient Info\" tab in inbasket, or \"Choose Columns\" in Meds & Orders section of the refill encounter.              Passed - Medication is active on med list        Passed - Patient is age 18 or older        Passed - No active pregnancy on record        Passed - No positive pregnancy test in last 12 months        "

## 2019-11-18 DIAGNOSIS — G47.09 OTHER INSOMNIA: ICD-10-CM

## 2019-11-19 RX ORDER — TRAZODONE HYDROCHLORIDE 50 MG/1
TABLET, FILM COATED ORAL
Qty: 60 TABLET | Refills: 0 | Status: SHIPPED | OUTPATIENT
Start: 2019-11-19 | End: 2019-12-18

## 2019-11-19 NOTE — TELEPHONE ENCOUNTER
Medication is being filled for 1 time refill only due to:  Patient needs to be seen because due for physical. Last 8/7/18.     Mlog message sent to patient asking her to schedule an appointment.  Elizabeth Elder RN

## 2019-11-19 NOTE — TELEPHONE ENCOUNTER
"Last Written Prescription Date:  2/22/19  Last Fill Quantity: 180,  # refills: 1   Last office visit: 5/1/2019 with prescribing provider:  5/1/19   Future Office Visit:    Requested Prescriptions   Pending Prescriptions Disp Refills     traZODone (DESYREL) 50 MG tablet [Pharmacy Med Name: TRAZODONE 50MG TABLETS] 180 tablet 0     Sig: TAKE 2 TABLETS(100 MG) BY MOUTH AT BEDTIME       Serotonin Modulators Passed - 11/18/2019 10:46 AM        Passed - Recent (12 mo) or future (30 days) visit within the authorizing provider's specialty     Patient has had an office visit with the authorizing provider or a provider within the authorizing providers department within the previous 12 mos or has a future within next 30 days. See \"Patient Info\" tab in inbasket, or \"Choose Columns\" in Meds & Orders section of the refill encounter.              Passed - Medication is active on med list        Passed - Patient is age 18 or older        Passed - No active pregnancy on record        Passed - No positive pregnancy test in past 12 months          "

## 2019-11-24 DIAGNOSIS — F41.1 GAD (GENERALIZED ANXIETY DISORDER): ICD-10-CM

## 2019-11-24 DIAGNOSIS — F41.1 GENERALIZED ANXIETY DISORDER: ICD-10-CM

## 2019-11-25 RX ORDER — BUPROPION HYDROCHLORIDE 100 MG/1
TABLET, EXTENDED RELEASE ORAL
Qty: 30 TABLET | Refills: 0 | Status: SHIPPED | OUTPATIENT
Start: 2019-11-25 | End: 2020-11-20

## 2019-11-25 NOTE — TELEPHONE ENCOUNTER
"Prescription approved per [facility] [place].  Isabella PANCHAL RN    Last Written Prescription Date:  10/14/2019  Last Fill Quantity: 30,  # refills: 0   Last office visit: 5/1/2019 with prescribing provider:     Future Office Visit:   Next 5 appointments (look out 90 days)    Dec 18, 2019 10:00 AM JONAS Oliva Physical Adult with Elisabeth Powell DO  [facility] ([facility] [id number] [id number] [phone]         Requested Prescriptions   Pending Prescriptions Disp Refills     buPROPion (WELLBUTRIN SR) 100 MG 12 hr tablet [Pharmacy Med Name: BUPROPION SR 100MG TABLET (12 H)] 90 tablet 0     Sig: TAKE ONE TABLET BY MOUTH DAILY       SSRIs Protocol Passed - 11/24/2019  5:32 PM        Passed - Recent (12 mo) or future (30 days) visit within the authorizing provider's specialty     Patient has had an office visit with the authorizing provider or a provider within the authorizing providers department within the previous 12 mos or has a future within next 30 days. See \"Patient Info\" tab in inbasket, or \"Choose Columns\" in Meds & Orders section of the refill encounter.              Passed - Medication is Bupropion     If the medication is Bupropion (Wellbutrin), and the patient is taking for smoking cessation; OK to refill.          Passed - Medication is active on med list        Passed - Patient is age 18 or older        Passed - No active pregnancy on record        Passed - No positive pregnancy test in last 12 months        "

## 2019-12-18 ENCOUNTER — OFFICE VISIT (OUTPATIENT)
Dept: FAMILY MEDICINE | Facility: CLINIC | Age: 44
End: 2019-12-18
Payer: COMMERCIAL

## 2019-12-18 VITALS
HEART RATE: 66 BPM | HEIGHT: 63 IN | SYSTOLIC BLOOD PRESSURE: 119 MMHG | OXYGEN SATURATION: 100 % | DIASTOLIC BLOOD PRESSURE: 82 MMHG | WEIGHT: 129.7 LBS | RESPIRATION RATE: 12 BRPM | BODY MASS INDEX: 22.98 KG/M2 | TEMPERATURE: 97.3 F

## 2019-12-18 DIAGNOSIS — Z71.84 COUNSELING ABOUT TRAVEL: ICD-10-CM

## 2019-12-18 DIAGNOSIS — G47.09 OTHER INSOMNIA: ICD-10-CM

## 2019-12-18 DIAGNOSIS — F41.1 GAD (GENERALIZED ANXIETY DISORDER): ICD-10-CM

## 2019-12-18 DIAGNOSIS — E83.110 HEREDITARY HEMOCHROMATOSIS (H): ICD-10-CM

## 2019-12-18 DIAGNOSIS — Z00.00 ROUTINE GENERAL MEDICAL EXAMINATION AT A HEALTH CARE FACILITY: Primary | ICD-10-CM

## 2019-12-18 LAB
ALBUMIN SERPL-MCNC: 3.7 G/DL (ref 3.4–5)
ALP SERPL-CCNC: 43 U/L (ref 40–150)
ALT SERPL W P-5'-P-CCNC: 23 U/L (ref 0–50)
ANION GAP SERPL CALCULATED.3IONS-SCNC: 4 MMOL/L (ref 3–14)
AST SERPL W P-5'-P-CCNC: 16 U/L (ref 0–45)
BILIRUB SERPL-MCNC: 0.3 MG/DL (ref 0.2–1.3)
BUN SERPL-MCNC: 15 MG/DL (ref 7–30)
CALCIUM SERPL-MCNC: 8.8 MG/DL (ref 8.5–10.1)
CHLORIDE SERPL-SCNC: 104 MMOL/L (ref 94–109)
CHOLEST SERPL-MCNC: 240 MG/DL
CO2 SERPL-SCNC: 28 MMOL/L (ref 20–32)
CREAT SERPL-MCNC: 0.76 MG/DL (ref 0.52–1.04)
FERRITIN SERPL-MCNC: 54 NG/ML (ref 12–150)
GFR SERPL CREATININE-BSD FRML MDRD: >90 ML/MIN/{1.73_M2}
GLUCOSE SERPL-MCNC: 89 MG/DL (ref 70–99)
HDLC SERPL-MCNC: 61 MG/DL
LDLC SERPL CALC-MCNC: 140 MG/DL
NONHDLC SERPL-MCNC: 179 MG/DL
POTASSIUM SERPL-SCNC: 3.8 MMOL/L (ref 3.4–5.3)
PROT SERPL-MCNC: 7.3 G/DL (ref 6.8–8.8)
SODIUM SERPL-SCNC: 136 MMOL/L (ref 133–144)
TRIGL SERPL-MCNC: 196 MG/DL

## 2019-12-18 PROCEDURE — 90632 HEPA VACCINE ADULT IM: CPT | Performed by: FAMILY MEDICINE

## 2019-12-18 PROCEDURE — 80061 LIPID PANEL: CPT | Performed by: FAMILY MEDICINE

## 2019-12-18 PROCEDURE — 80053 COMPREHEN METABOLIC PANEL: CPT | Performed by: FAMILY MEDICINE

## 2019-12-18 PROCEDURE — 82728 ASSAY OF FERRITIN: CPT | Performed by: FAMILY MEDICINE

## 2019-12-18 PROCEDURE — 90471 IMMUNIZATION ADMIN: CPT | Performed by: FAMILY MEDICINE

## 2019-12-18 PROCEDURE — 99396 PREV VISIT EST AGE 40-64: CPT | Mod: 25 | Performed by: FAMILY MEDICINE

## 2019-12-18 PROCEDURE — 36415 COLL VENOUS BLD VENIPUNCTURE: CPT | Performed by: FAMILY MEDICINE

## 2019-12-18 RX ORDER — AZITHROMYCIN 500 MG/1
TABLET, FILM COATED ORAL
Qty: 3 TABLET | Refills: 0 | Status: SHIPPED | OUTPATIENT
Start: 2019-12-18 | End: 2021-01-20

## 2019-12-18 RX ORDER — TRAZODONE HYDROCHLORIDE 50 MG/1
50 TABLET, FILM COATED ORAL AT BEDTIME
Qty: 90 TABLET | Refills: 1 | Status: SHIPPED | OUTPATIENT
Start: 2019-12-18 | End: 2020-09-21

## 2019-12-18 RX ORDER — BUPROPION HYDROCHLORIDE 100 MG/1
100 TABLET, EXTENDED RELEASE ORAL DAILY
Qty: 90 TABLET | Refills: 1 | Status: SHIPPED | OUTPATIENT
Start: 2019-12-18 | End: 2020-07-24

## 2019-12-18 ASSESSMENT — ACTIVITIES OF DAILY LIVING (ADL): CURRENT_FUNCTION: NO ASSISTANCE NEEDED

## 2019-12-18 ASSESSMENT — MIFFLIN-ST. JEOR: SCORE: 1207.45

## 2019-12-18 ASSESSMENT — PATIENT HEALTH QUESTIONNAIRE - PHQ9: SUM OF ALL RESPONSES TO PHQ QUESTIONS 1-9: 0

## 2019-12-18 NOTE — PROGRESS NOTES
SUBJECTIVE:   CC: Yoselyn Mcgill is an 44 year old woman who presents for preventive health visit.     Healthy Habits:     Getting at least 3 servings of Calcium per day:  Yes    Bi-annual eye exam:  Yes    Dental care twice a year:  Yes    Sleep apnea or symptoms of sleep apnea:  None    Diet:  Regular (no restrictions)    Frequency of exercise:  4-5 days/week    Duration of exercise:  45-60 minutes    Taking medications regularly:  Yes    Medication side effects:  None    PHQ-2 Total Score: 0    Additional concerns today:  No            Today's PHQ-2 Score:   PHQ-2 ( 1999 [employer]) 12/18/2019   Q1: Little interest or pleasure in doing things 0   Q2: Feeling down, depressed or hopeless 0   PHQ-2 Score 0   Q1: Little interest or pleasure in doing things Not at all   Q2: Feeling down, depressed or hopeless Not at all   PHQ-2 Score 0       Abuse: Current or Past(Physical, Sexual or Emotional)- No  Do you feel safe in your environment? Yes        Social History     Tobacco Use     Smoking status: Never Smoker     Smokeless tobacco: Never Used   Substance Use Topics     Alcohol use: Yes     Alcohol/week: 0.0 standard drinks     Frequency: Monthly or less     Drinks per session: 1 or 2     Binge frequency: Never     Comment: socially     If you drink alcohol do you typically have >3 drinks per day or >7 drinks per week? No    Alcohol Use 12/18/2019   Prescreen: >3 drinks/day or >7 drinks/week? No   Prescreen: >3 drinks/day or >7 drinks/week? -   No flowsheet data found.    Reviewed orders with patient.  Reviewed health maintenance and updated orders accordingly - Yes  Patient Active Problem List   Diagnosis     Dysplasia of cervix     CARDIOVASCULAR SCREENING; LDL GOAL LESS THAN 160     Family history of malignant neoplasm of breast     YANIRA (generalized anxiety disorder)     Family history of hemochromatosis     Hereditary hemochromatosis (H)     Past Surgical History:   Procedure Laterality Date     ANKLE SURGERY   08/10/11      SECTION N/A 2015    Procedure:  SECTION;  Surgeon: Emma Kendall MD;  Location:  L+D     ELECTROCONVULSIVE THERAPY  -    [facility] Dr [facility]       Social History     Tobacco Use     Smoking status: Never Smoker     Smokeless tobacco: Never Used   Substance Use Topics     Alcohol use: Yes     Alcohol/week: 0.0 standard drinks     Frequency: Monthly or less     Drinks per session: 1 or 2     Binge frequency: Never     Comment: socially     Family History   Problem Relation Age of Onset     C.A.D. Mother      Hypertension Mother      Breast Cancer Mother      C.A.D. Maternal Grandfather      Hypertension Maternal Grandfather      Alcohol/Drug Maternal Grandfather      C.A.D. Paternal Grandfather      Depression Paternal Grandmother      Other - See Comments Father         hereditary hemochromotosis           Mammogram Screening: Patient under age 50, mutual decision reflected in health maintenance.      Pertinent mammograms are reviewed under the imaging tab.  History of abnormal Pap smear: YES - updated in Problem List and Health Maintenance accordingly  PAP / HPV Latest Ref Rng & Units 2018   PAP - NIL NIL NIL   HPV 16 DNA NEG:Negative Negative Negative -   HPV 18 DNA NEG:Negative Negative Negative -   OTHER HR HPV NEG:Negative Negative Negative -     Reviewed and updated as needed this visit by clinical staff  Tobacco  Allergies  Meds  Problems  Med Hx  Surg Hx  Fam Hx         Reviewed and updated as needed this visit by Provider  Tobacco  Allergies  Meds  Problems  Med Hx  Surg Hx  Fam Hx            Review of Systems  CONSTITUTIONAL: NEGATIVE for fever, chills, change in weight  INTEGUMENTARU/SKIN: NEGATIVE for worrisome rashes, moles or lesions  EYES: NEGATIVE for vision changes or irritation  ENT: NEGATIVE for ear, mouth and throat problems  RESP: NEGATIVE for significant cough or SOB  BREAST: NEGATIVE for masses, tenderness  "or discharge  CV: NEGATIVE for chest pain, palpitations or peripheral edema  GI: NEGATIVE for nausea, abdominal pain, heartburn, or change in bowel habits  : NEGATIVE for unusual urinary or vaginal symptoms. Periods are regular.  MUSCULOSKELETAL: NEGATIVE for significant arthralgias or myalgia  NEURO: NEGATIVE for weakness, dizziness or paresthesias  PSYCHIATRIC: NEGATIVE for changes in mood or affect     OBJECTIVE:   /82   Pulse 66   Temp 97.3  F (36.3  C) (Oral)   Resp 12   Ht 1.6 m (5' 3\")   Wt 58.8 kg (129 lb 11.2 oz)   LMP 12/08/2019 (Approximate)   SpO2 100%   BMI 22.98 kg/m    Physical Exam  GENERAL: healthy, alert and no distress  EYES: Eyes grossly normal to inspection, PERRL and conjunctivae and sclerae normal  HENT: ear canals and TM's normal, nose and mouth without ulcers or lesions  NECK: no adenopathy, no asymmetry, masses, or scars and thyroid normal to palpation  RESP: lungs clear to auscultation - no rales, rhonchi or wheezes  BREAST: normal without masses, tenderness or nipple discharge and no palpable axillary masses or adenopathy  CV: regular rate and rhythm, normal S1 S2, no S3 or S4, no murmur, click or rub, no peripheral edema and peripheral pulses strong  ABDOMEN: soft, nontender, no hepatosplenomegaly, no masses and bowel sounds normal  MS: no gross musculoskeletal defects noted, no edema  SKIN: no suspicious lesions or rashes  NEURO: Normal strength and tone, mentation intact and speech normal  PSYCH: mentation appears normal, affect normal/bright    Diagnostic Test Results:  Labs reviewed in Epic    ASSESSMENT/PLAN:   1. Routine general medical examination at a health care facility     - Lipid panel reflex to direct LDL Non-fasting    2. YANIRA (generalized anxiety disorder)   refilled - stable   - sertraline (ZOLOFT) 50 MG tablet; TAKE 1/2 TABLET(25 MG) BY MOUTH DAILY  Dispense: 45 tablet; Refill: 1  - buPROPion (WELLBUTRIN SR) 100 MG 12 hr tablet; Take 1 tablet (100 mg) by " "mouth daily  Dispense: 90 tablet; Refill: 1  - Comprehensive metabolic panel (BMP + Alb, Alk Phos, ALT, AST, Total. Bili, TP)    3. Other insomnia   refilled   F/u 6 months   - traZODone (DESYREL) 50 MG tablet; Take 1 tablet (50 mg) by mouth At Bedtime  Dispense: 90 tablet; Refill: 1  - Comprehensive metabolic panel (BMP + Alb, Alk Phos, ALT, AST, Total. Bili, TP)    4. Hereditary hemochromatosis (H)   pending lab  - Ferritin    5. Counseling about travel    - azithromycin (ZITHROMAX) 500 MG tablet; Take one tablet daily for up to 3 days as needed for traveler's diarrhea  Dispense: 3 tablet; Refill: 0    COUNSELING:  Reviewed preventive health counseling, as reflected in patient instructions    Estimated body mass index is 22.98 kg/m  as calculated from the following:    Height as of this encounter: 1.6 m (5' 3\").    Weight as of this encounter: 58.8 kg (129 lb 11.2 oz).         reports that she has never smoked. She has never used smokeless tobacco.      Counseling Resources:  ATP IV Guidelines  Pooled Cohorts Equation Calculator  Breast Cancer Risk Calculator  FRAX Risk Assessment  ICSI Preventive Guidelines  Dietary Guidelines for Americans, 2010  USDA's MyPlate  ASA Prophylaxis  Lung CA Screening    Elisabeth Powell, DO  [facility]  "

## 2019-12-18 NOTE — NURSING NOTE
"Chief Complaint   Patient presents with     Physical     /82   Pulse 66   Temp 97.3  F (36.3  C) (Oral)   Resp 12   Ht 1.6 m (5' 3\")   Wt 58.8 kg (129 lb 11.2 oz)   LMP 12/08/2019 (Approximate)   SpO2 100%   BMI 22.98 kg/m   Estimated body mass index is 22.98 kg/m  as calculated from the following:    Height as of this encounter: 1.6 m (5' 3\").    Weight as of this encounter: 58.8 kg (129 lb 11.2 oz).  bp completed using cuff size: regular      Health Maintenance addressed:  PHQ9    Possibly completing today per provider review.    Letty Joy MA    "

## 2019-12-19 ENCOUNTER — MYC MEDICAL ADVICE (OUTPATIENT)
Dept: FAMILY MEDICINE | Facility: CLINIC | Age: 44
End: 2019-12-19

## 2019-12-20 NOTE — RESULT ENCOUNTER NOTE
Dear Yoselyn,   Your test results are all back -   -Cholesterol levels (LDL,HDL, Triglycerides) are borderline - keep working on healthy diet and exercise.  ADVISE: rechecking in 1 year.   -Liver and gallbladder tests (ALT,AST, Alk phos,bilirubin) are normal.  -Kidney function (GFR) is normal.  -Sodium is normal.  -Potassium is normal.  -Calcium is normal.  -Glucose (diabetic screening test) is normal.  -Ferritin (iron) level is normal.  Let us know if you have any questions.  -Elisabeth Powell, DO

## 2019-12-26 DIAGNOSIS — G47.09 OTHER INSOMNIA: ICD-10-CM

## 2019-12-26 RX ORDER — TRAZODONE HYDROCHLORIDE 50 MG/1
TABLET, FILM COATED ORAL
Start: 2019-12-26

## 2019-12-30 ENCOUNTER — TELEPHONE (OUTPATIENT)
Dept: FAMILY MEDICINE | Facility: CLINIC | Age: 44
End: 2019-12-30

## 2019-12-30 NOTE — TELEPHONE ENCOUNTER
Generic OCP can sometimes do that (slight variations)-   If she got a different generic she should discuss with the pharmacist and see if they can get her the one that was working.  I know she was working with Dr. Hutchison on this issue.    PN

## 2019-12-30 NOTE — TELEPHONE ENCOUNTER
Reason for Call:  Other prescription    Detailed comments: Patient is calling about her birth control because she is having very heavy flow and she is having extreme cramping. Patient is wondering whether this one would have different side affects from the one she was taking before since its the generic     Phone Number Patient can be reached at: Home number on file [phone] (home)    Best Time: anytime     Can we leave a detailed message on this number? YES    Call taken on 12/30/2019 at 11:16 AM by Lexie Clark

## 2019-12-30 NOTE — TELEPHONE ENCOUNTER
PN,  Pt says she picked up a new generic OCP and started having cramping and spotting on day 15 (this past saturday) yesterday and today cramping and bleeding is worse.  Today is like a full period per pt.   Last period was 12/8 per pt.  Asked if she took a home pregnancy test and pt said she has not.  She says she is just wanting to know if the generic from pharmacy is the same formula as the previous one she was on even though they gave her generic.  Please advise.  Thanks,  Tori Edwards RN    I advised being seen if bleeding increasing or going through pad/tampon/hr or severe abd cramping. Pt says she sent a [employer] message to her OB which is also in chart.

## 2020-02-14 ENCOUNTER — TELEPHONE (OUTPATIENT)
Dept: FAMILY MEDICINE | Facility: CLINIC | Age: 45
End: 2020-02-14

## 2020-02-14 ENCOUNTER — OFFICE VISIT (OUTPATIENT)
Dept: FAMILY MEDICINE | Facility: CLINIC | Age: 45
End: 2020-02-14
Payer: COMMERCIAL

## 2020-02-14 VITALS
TEMPERATURE: 98.4 F | BODY MASS INDEX: 22.27 KG/M2 | SYSTOLIC BLOOD PRESSURE: 144 MMHG | HEIGHT: 63 IN | WEIGHT: 125.7 LBS | OXYGEN SATURATION: 98 % | HEART RATE: 78 BPM | DIASTOLIC BLOOD PRESSURE: 91 MMHG

## 2020-02-14 DIAGNOSIS — R30.0 DYSURIA: Primary | ICD-10-CM

## 2020-02-14 DIAGNOSIS — R10.2 PELVIC PAIN IN FEMALE: ICD-10-CM

## 2020-02-14 LAB
ALBUMIN UR-MCNC: NEGATIVE MG/DL
APPEARANCE UR: CLEAR
BILIRUB UR QL STRIP: NEGATIVE
COLOR UR AUTO: YELLOW
GLUCOSE UR STRIP-MCNC: NEGATIVE MG/DL
HGB UR QL STRIP: NEGATIVE
KETONES UR STRIP-MCNC: NEGATIVE MG/DL
LEUKOCYTE ESTERASE UR QL STRIP: ABNORMAL
NITRATE UR QL: NEGATIVE
NON-SQ EPI CELLS #/AREA URNS LPF: ABNORMAL /LPF
PH UR STRIP: 6.5 PH (ref 5–7)
RBC #/AREA URNS AUTO: ABNORMAL /HPF
SOURCE: ABNORMAL
SP GR UR STRIP: 1.01 (ref 1–1.03)
SPECIMEN SOURCE: NORMAL
UROBILINOGEN UR STRIP-ACNC: 0.2 EU/DL (ref 0.2–1)
WBC #/AREA URNS AUTO: ABNORMAL /HPF
WET PREP SPEC: NORMAL

## 2020-02-14 PROCEDURE — 87086 URINE CULTURE/COLONY COUNT: CPT | Performed by: FAMILY MEDICINE

## 2020-02-14 PROCEDURE — 99213 OFFICE O/P EST LOW 20 MIN: CPT | Performed by: FAMILY MEDICINE

## 2020-02-14 PROCEDURE — 81001 URINALYSIS AUTO W/SCOPE: CPT | Performed by: FAMILY MEDICINE

## 2020-02-14 PROCEDURE — 87210 SMEAR WET MOUNT SALINE/INK: CPT | Performed by: FAMILY MEDICINE

## 2020-02-14 ASSESSMENT — MIFFLIN-ST. JEOR: SCORE: 1189.3

## 2020-02-14 NOTE — TELEPHONE ENCOUNTER
PN,   Pt has had intermittent urinary symptoms for a couple weeks  3 days ago symptoms worsened  Pt believes she has UTI now  Has never had UTI before    Patient has pressure to lower abd  Discomfort when urinates too   Urine much more yellow   Frequently urinating   Currently [facility] the co-op getting cranberry stuff to take   Advised appt - pt prefers to see y ou  Can you DB her?  Please advise  Thanks,  Isabella PANCHAL RN

## 2020-02-14 NOTE — PROGRESS NOTES
Subjective     Yoselyn Mcgill is a 44 year old female who presents to clinic today for the following health issues:    HPI   URINARY TRACT SYMPTOMS      Duration: on and off 6 weeks    Description  frequency and abdominal pain    Intensity:  mild, moderate    Accompanying signs and symptoms:  Fever/chills: no   Flank pain no   Nausea and vomiting: no   Vaginal symptoms: urine smells different  Abdominal/Pelvic Pain: YES    History  History of frequent UTI's: no   History of kidney stones: no   Sexually Active: YES  Possibility of pregnancy: No    Precipitating or alleviating factors: None    Therapies tried and outcome: cran actin, UTokay and water  Outcome: patient felt better     Used some homeopathic med - Dmannose  UTOK     LMP -   On good Rx for OCP - day 6 of her OCP   LMP    Vaginal discharge - no symptoms      -------------------------------------    Patient Active Problem List   Diagnosis     Dysplasia of cervix     CARDIOVASCULAR SCREENING; LDL GOAL LESS THAN 160     Family history of malignant neoplasm of breast     YANIRA (generalized anxiety disorder)     Family history of hemochromatosis     Hereditary hemochromatosis (H)     Past Surgical History:   Procedure Laterality Date     ANKLE SURGERY  08/10/11      SECTION N/A 2015    Procedure:  SECTION;  Surgeon: Emma Kendall MD;  [facility]: [facility]     ELECTROCONVULSIVE THERAPY  -    [facility]       Social History     Tobacco Use     Smoking status: Never Smoker     Smokeless tobacco: Never Used   Substance Use Topics     Alcohol use: Yes     Alcohol/week: 0.0 standard drinks     Frequency: Monthly or less     Drinks per session: 1 or 2     Binge frequency: Never     Comment: socially     Family History   Problem Relation Age of Onset     C.A.D. Mother      Hypertension Mother      Breast Cancer Mother      C.A.D. Maternal Grandfather      Hypertension Maternal Grandfather      Alcohol/Drug  "Maternal Grandfather      C.A.D. Paternal Grandfather      Depression Paternal Grandmother      Other - See Comments Father         hereditary hemochromotosis           Reviewed and updated as needed this visit by Provider  Tobacco  Allergies  Meds  Problems  Med Hx  Surg Hx  Fam Hx         Review of Systems   ROS COMP: Constitutional, HEENT, cardiovascular, pulmonary, GI, , musculoskeletal, neuro, skin, endocrine and psych systems are negative, except as otherwise noted.      Objective    BP (!) 144/91   Pulse 78   Temp 98.4  F (36.9  C) (Oral)   Ht 1.6 m (5' 3\")   Wt 57 kg (125 lb 11.2 oz)   SpO2 98%   BMI 22.27 kg/m    Body mass index is 22.27 kg/m .  Physical Exam   GENERAL: healthy, alert and no distress   (female): normal female external genitalia, normal urethral meatus , vaginal mucosa pink, moist, well rugated and uterus enlarged -     Diagnostic Test Results:  Labs reviewed in Epic  Results for orders placed or performed in visit on 02/14/20 (from the past 24 hour(s))   UA with Microscopic reflex to Culture   Result Value Ref Range    Color Urine Yellow     Appearance Urine Clear     Glucose Urine Negative NEG^Negative mg/dL    Bilirubin Urine Negative NEG^Negative    Ketones Urine Negative NEG^Negative mg/dL    Specific Gravity Urine 1.010 1.003 - 1.035    pH Urine 6.5 5.0 - 7.0 pH    Protein Albumin Urine Negative NEG^Negative mg/dL    Urobilinogen Urine 0.2 0.2 - 1.0 EU/dL    Nitrite Urine Negative NEG^Negative    Blood Urine Negative NEG^Negative    Leukocyte Esterase Urine Trace (A) NEG^Negative    Source Midstream Urine     WBC Urine 0 - 5 OTO5^0 - 5 /HPF    RBC Urine O - 2 OTO2^O - 2 /HPF    Squamous Epithelial /LPF Urine Many (A) FEW^Few /LPF           Assessment & Plan     1. Dysuria   see below  - UA with Microscopic reflex to Culture  - Wet prep  - Urine Culture Aerobic Bacterial    2. Pelvic pain in female  Pelvic symptoms for 6 weeks - causing urinary symptoms  UA is negative " - suspect this is more pressure on the bladder from her uterus  Will check pelvic ultrasound - has hx of focal lesion in the uterus and want to make sure it has not changed in size  Pt will call or RTC if symptoms worsen or do not improve.   - Wet prep  - US Pelvic Complete w Transvaginal; Future         Return in about 2 weeks (around 2/28/2020) for If symptoms worsen or do not improve.    Elisabeth Powell, DO  [facility]

## 2020-02-14 NOTE — NURSING NOTE
"Chief Complaint   Patient presents with     UTI     BP (!) 144/91   Pulse 78   Temp 98.4  F (36.9  C) (Oral)   Ht 1.6 m (5' 3\")   Wt 57 kg (125 lb 11.2 oz)   SpO2 98%   BMI 22.27 kg/m   Estimated body mass index is 22.27 kg/m  as calculated from the following:    Height as of this encounter: 1.6 m (5' 3\").    Weight as of this encounter: 57 kg (125 lb 11.2 oz).  bp completed using cuff size: regular      Health Maintenance addressed:  NONE    n/a    Letty Joy MA    "

## 2020-02-14 NOTE — TELEPHONE ENCOUNTER
Reason for call:  Patient reporting a symptom    Symptom or request: Pt called and thinks she has a UTI, but has never had one before and doesn't know for sure. She reports lower abdominal discomfort and frequent urination. Pt has leftover antibiotics from her recent  trip to [place] that is not open.    Duration (how long have symptoms been present):   On and off for 3 days     Have you been treated for this before? No    Additional comments: N/A    Phone Number patient can be reached at:  Cell number on file:    Telephone Information:   Mobile [phone]       Best Time:  Any    Can we leave a detailed message on this number:  YES    Call taken on 2/14/2020 at 9:00 AM by Joann Dias

## 2020-02-15 LAB
BACTERIA SPEC CULT: NORMAL
SPECIMEN SOURCE: NORMAL

## 2020-03-06 ENCOUNTER — HOSPITAL ENCOUNTER (OUTPATIENT)
Dept: MAMMOGRAPHY | Facility: CLINIC | Age: 45
Discharge: HOME | End: 2020-03-06
Attending: FAMILY MEDICINE | Admitting: FAMILY MEDICINE
Payer: COMMERCIAL

## 2020-03-06 DIAGNOSIS — Z12.31 VISIT FOR SCREENING MAMMOGRAM: ICD-10-CM

## 2020-03-06 PROCEDURE — 77067 SCR MAMMO BI INCL CAD: CPT

## 2020-06-28 DIAGNOSIS — F41.1 GAD (GENERALIZED ANXIETY DISORDER): ICD-10-CM

## 2020-06-30 NOTE — TELEPHONE ENCOUNTER
Routing refill request to provider for review/approval because:  Drug interaction warning:  Drug-Drug: sertraline and traZODone  Additive serotonergic effects may occur during coadministration of selective serotonin reuptake inhibitors (SSRIs) and Serotonergic Non-Opioid CNS Depressants, and the risk of developing serotonin syndrome may be increased.  Details    Thanks,  Elizabeth Elder RN    Last Written Prescription Date:  12/18/2019  Last Fill Quantity: 45,  # refills: 1   Last office visit: 2/14/2020 with prescribing provider:  Yes, PN   Physical 12/18/19  Future Office Visit:    Takes for anxiety.

## 2020-07-23 DIAGNOSIS — F41.1 GAD (GENERALIZED ANXIETY DISORDER): ICD-10-CM

## 2020-07-24 RX ORDER — BUPROPION HYDROCHLORIDE 100 MG/1
TABLET, EXTENDED RELEASE ORAL
Qty: 90 TABLET | Refills: 0 | Status: SHIPPED | OUTPATIENT
Start: 2020-07-24 | End: 2020-10-21

## 2020-07-24 NOTE — TELEPHONE ENCOUNTER
"Last Written Prescription Date:  12/18/2020  Last Fill Quantity: 90,  # refills: 1   Last office visit: 2/14/2020 with prescribing provider:  Yes, PN - Dysuria.    2/18/19 - physical  Takes for anxiety    Prescription approved per [facility] Refill Protocol.  Elizabeth Elder RN    Requested Prescriptions   Pending Prescriptions Disp Refills     buPROPion (WELLBUTRIN SR) 100 MG 12 hr tablet [Pharmacy Med Name: BUPROPION SR 100MG TABLETS (12 H)] 90 tablet 0     Sig: TAKE 1 TABLET BY MOUTH DAILY       SSRIs Protocol Passed - 7/23/2020  5:28 AM        Passed - Recent (12 mo) or future (30 days) visit within the authorizing provider's specialty     Patient has had an office visit with the authorizing provider or a provider within the authorizing providers department within the previous 12 mos or has a future within next 30 days. See \"Patient Info\" tab in inbasket, or \"Choose Columns\" in Meds & Orders section of the refill encounter.              Passed - Medication is Bupropion     If the medication is Bupropion (Wellbutrin), and the patient is taking for smoking cessation; OK to refill.          Passed - Medication is active on med list        Passed - Patient is age 18 or older        Passed - No active pregnancy on record        Passed - No positive pregnancy test in last 12 months             "

## 2020-09-12 NOTE — TELEPHONE ENCOUNTER
Both Rx's sent yesterday 7/19/2017    Sertraline:   Duplicate; Rx sent 7/19/2017    Trazodone:   Pharmacy requesting 90 day supply instead  Rx sent for 30 day supply with 5 refills  Resent for 90 day with 1 refill instead    Pending Prescriptions:                       Disp   Refills    traZODone (DESYREL) 50 MG tablet [Pharmacy*180 ta*5        Sig: TAKE 2 TABLETS(100 MG) BY MOUTH AT BEDTIME    sertraline (ZOLOFT) 50 MG tablet [Pharmacy*30 tab*0        Sig: TAKE ONE-HALF TABLET BY MOUTH DAILY FOR 1 TO 2 WEEKS,           THEN INCREASE TO 1 TABLET BY MOUTH DAILY         
The patient is a 63y Male complaining of multiple medical complaints.

## 2020-10-22 ENCOUNTER — MYC REFILL (OUTPATIENT)
Dept: FAMILY MEDICINE | Facility: CLINIC | Age: 45
End: 2020-10-22

## 2020-10-22 DIAGNOSIS — F41.1 GAD (GENERALIZED ANXIETY DISORDER): ICD-10-CM

## 2020-10-22 DIAGNOSIS — F41.1 GENERALIZED ANXIETY DISORDER: ICD-10-CM

## 2020-10-23 RX ORDER — LORAZEPAM 0.5 MG/1
0.5 TABLET ORAL EVERY 8 HOURS PRN
Qty: 20 TABLET | Refills: 0 | Status: SHIPPED | OUTPATIENT
Start: 2020-10-23 | End: 2021-07-14

## 2020-10-23 NOTE — TELEPHONE ENCOUNTER
PN,     Controlled Substance Refill Request for Ativan    Last refill: no fills in past year - last Rx 1/2019    Last clinic visit: 12/18/2019     Clinic visit frequency required: not documented  Next appt: none    Controlled substance agreement on file: No.    Documentation in problem list reviewed:  started, needs to be completed    Processing:  Rx to be electronically transmitted to pharmacy by provider     [place] reviewed:  reviewed- no concerns  [place]  [email or web address]    Please authorize if appropriate.  Thanks,  Isabella PANCHAL RN

## 2020-10-26 ENCOUNTER — TELEPHONE (OUTPATIENT)
Dept: FAMILY MEDICINE | Facility: CLINIC | Age: 45
End: 2020-10-26

## 2020-10-26 NOTE — TELEPHONE ENCOUNTER
Patient sent [place] also requesting appointment.  Message sent back to patient.  Elizabeth Elder RN

## 2020-10-26 NOTE — TELEPHONE ENCOUNTER
Reason for Call:  Other call back    Detailed comments: patient req a call back regarding her Intermittent severe abdominal discomfort since last week  Is going to schedule a [employer] appt with Rodriguez Costa tomorrow but would like a call back from Triage for advise    Phone Number Patient can be reached at: Home number on file [phone] (home)    Best Time: after 3:30 pm    Can we leave a detailed message on this number? Not Applicable    Call taken on 10/26/2020 at 1:54 PM by Marla Zavaleta

## 2020-10-27 ENCOUNTER — TELEPHONE (OUTPATIENT)
Dept: FAMILY MEDICINE | Facility: CLINIC | Age: 45
End: 2020-10-27

## 2020-10-27 ENCOUNTER — OFFICE VISIT (OUTPATIENT)
Dept: FAMILY MEDICINE | Facility: CLINIC | Age: 45
End: 2020-10-27
Payer: COMMERCIAL

## 2020-10-27 ENCOUNTER — NURSE TRIAGE (OUTPATIENT)
Dept: NURSING | Facility: CLINIC | Age: 45
End: 2020-10-27

## 2020-10-27 VITALS
TEMPERATURE: 99.1 F | WEIGHT: 133.7 LBS | RESPIRATION RATE: 18 BRPM | HEART RATE: 67 BPM | HEIGHT: 62 IN | BODY MASS INDEX: 24.61 KG/M2 | OXYGEN SATURATION: 100 % | SYSTOLIC BLOOD PRESSURE: 133 MMHG | DIASTOLIC BLOOD PRESSURE: 94 MMHG

## 2020-10-27 DIAGNOSIS — R10.31 RLQ ABDOMINAL PAIN: ICD-10-CM

## 2020-10-27 DIAGNOSIS — R10.32 ABDOMINAL PAIN, LEFT LOWER QUADRANT: Primary | ICD-10-CM

## 2020-10-27 DIAGNOSIS — R10.32 LLQ ABDOMINAL PAIN: Primary | ICD-10-CM

## 2020-10-27 DIAGNOSIS — R10.31 ABDOMINAL PAIN, RIGHT LOWER QUADRANT: ICD-10-CM

## 2020-10-27 LAB
BASOPHILS # BLD AUTO: 0 10E9/L (ref 0–0.2)
BASOPHILS NFR BLD AUTO: 0.2 %
DIFFERENTIAL METHOD BLD: NORMAL
EOSINOPHIL # BLD AUTO: 0.1 10E9/L (ref 0–0.7)
EOSINOPHIL NFR BLD AUTO: 1.9 %
ERYTHROCYTE [DISTWIDTH] IN BLOOD BY AUTOMATED COUNT: 12.2 % (ref 10–15)
HCT VFR BLD AUTO: 39.3 % (ref 35–47)
HGB BLD-MCNC: 12.9 G/DL (ref 11.7–15.7)
LYMPHOCYTES # BLD AUTO: 2.4 10E9/L (ref 0.8–5.3)
LYMPHOCYTES NFR BLD AUTO: 37 %
MCH RBC QN AUTO: 31.2 PG (ref 26.5–33)
MCHC RBC AUTO-ENTMCNC: 32.8 G/DL (ref 31.5–36.5)
MCV RBC AUTO: 95 FL (ref 78–100)
MONOCYTES # BLD AUTO: 0.5 10E9/L (ref 0–1.3)
MONOCYTES NFR BLD AUTO: 7.7 %
NEUTROPHILS # BLD AUTO: 3.5 10E9/L (ref 1.6–8.3)
NEUTROPHILS NFR BLD AUTO: 53.2 %
PLATELET # BLD AUTO: 233 10E9/L (ref 150–450)
RBC # BLD AUTO: 4.13 10E12/L (ref 3.8–5.2)
WBC # BLD AUTO: 6.5 10E9/L (ref 4–11)

## 2020-10-27 PROCEDURE — 80053 COMPREHEN METABOLIC PANEL: CPT | Performed by: PHYSICIAN ASSISTANT

## 2020-10-27 PROCEDURE — 36415 COLL VENOUS BLD VENIPUNCTURE: CPT | Performed by: PHYSICIAN ASSISTANT

## 2020-10-27 PROCEDURE — 85025 COMPLETE CBC W/AUTO DIFF WBC: CPT | Performed by: PHYSICIAN ASSISTANT

## 2020-10-27 PROCEDURE — 99214 OFFICE O/P EST MOD 30 MIN: CPT | Performed by: PHYSICIAN ASSISTANT

## 2020-10-27 ASSESSMENT — MIFFLIN-ST. JEOR: SCORE: 1208.68

## 2020-10-27 ASSESSMENT — PATIENT HEALTH QUESTIONNAIRE - PHQ9: SUM OF ALL RESPONSES TO PHQ QUESTIONS 1-9: 3

## 2020-10-27 NOTE — TELEPHONE ENCOUNTER
JS,    [facility]  Please see message below.    Order T'd up    Thanks,  Elizabeth Elder RN

## 2020-10-27 NOTE — PROGRESS NOTES
"Subjective     Yoselyn Mcgill is a 45 year old female who presents to clinic today for the following health issues:    HPI         Abdominal/Flank Pain  Onset/Duration: x5 days   Description:   Character: Cramping, fullness  Location: right lower quadrant, left lower quadrant  Radiation: to upper abdominal quadrants  Intensity: mild  Progression of Symptoms:  Same, present more than half of the time   Accompanying Signs & Symptoms: more irritable due to discomfort  Fever/Chills: no  Gas/Bloating: YES- bloating   Nausea: no  Vomitting: no  Diarrhea: no  Constipation: YES  Dysuria or Hematuria: no  History:   Trauma: no  Previous similar pain: no  Previous tests done: none  Precipitating factors:   Does the pain change with:     Food: no    Bowel Movement: no    Urination: no   Other factors:  no  Therapies tried and outcome: castor oil, more fluids, magnesium supplement - not helpful     Patient's last menstrual period was 10/12/2020 (exact date).    46 y/o female with the above symptoms for the last 5 days.  Seems to be getting worse.    She has has some GYN issues in the past, but it did not feel like this, and since she was started on birth control pills, has been feeling pretty good.        Review of Systems   Constitutional, HEENT, cardiovascular, pulmonary, gi and gu systems are negative, except as otherwise noted.      Objective    BP (!) 133/94 (Patient Position: Sitting, Cuff Size: Adult Regular)   Pulse 67   Temp 99.1  F (37.3  C) (Oral)   Resp 18   Ht 1.581 m (5' 2.25\")   Wt 60.6 kg (133 lb 11.2 oz)   LMP 10/12/2020 (Exact Date)   SpO2 100%   BMI 24.26 kg/m    Body mass index is 24.26 kg/m .  Physical Exam   GENERAL: alert and no distress  EYES: Eyes grossly normal to inspection  NECK: no adenopathy, no asymmetry, masses, or scars and thyroid normal to palpation  RESP: lungs clear to auscultation - no rales, rhonchi or wheezes  CV: regular rate and rhythm, normal S1 S2, no S3 or S4, no murmur, " click or rub, no peripheral edema and peripheral pulses strong  ABDOMEN: normal bowel sounds, no masses palpated.  Tender both RLQ and LLQ without rebound or guarding.  PSYCH: mentation appears normal, affect normal/bright    No results found for this or any previous visit (from the past 24 hour(s)).        Assessment & Plan     LLQ abdominal pain  Will start some work up, does not seem like acute abdomen based on exam, but need further investigation with labs and image.  - CBC with platelets differential  - Comprehensive metabolic panel  - CT Abdomen Pelvis w/o & w Contrast; Future    RLQ abdominal pain    - CBC with platelets differential  - Comprehensive metabolic panel  - CT Abdomen Pelvis w/o & w Contrast; Future            No follow-ups on file.    Jama Costa PA-C  [facility]

## 2020-10-27 NOTE — TELEPHONE ENCOUNTER
Reason for Call: Request for an order or referral:    Order or referral being requested: CT    Date needed: as soon as possible    Has the patient been seen by the PCP for this problem? YES    Additional comments: order was sent for with and without contrast and [facility] wants it for just with contrast per radiologist protocol at their clinic  Phone: [phone]  Fax: [phone]  Pt appt is tomorrow. Would like a response by 5pm today  Phone number Patient can be reached at:      Best Time:  any    Can we leave a detailed message on this number?  Not Applicable    Call taken on 10/27/2020 at 2:33 PM by Azar Alcocer

## 2020-10-27 NOTE — RESULT ENCOUNTER NOTE
Dear Yoselyn    Your test results are attached, feel free to contact me via [employer]     We do have the blood count back, and overall this does look good.   Usually with things like infection or bowel obstructions, we usually see a bump in the WBC.  Obviously this is not always the case, which is why I am glad you getting your scan tomorrow.      Rodriguez Costa PA-C

## 2020-10-28 ENCOUNTER — TRANSFERRED RECORDS (OUTPATIENT)
Dept: HEALTH INFORMATION MANAGEMENT | Facility: CLINIC | Age: 45
End: 2020-10-28

## 2020-10-28 LAB
ALBUMIN SERPL-MCNC: 3.6 G/DL (ref 3.4–5)
ALP SERPL-CCNC: 49 U/L (ref 40–150)
ALT SERPL W P-5'-P-CCNC: 22 U/L (ref 0–50)
ANION GAP SERPL CALCULATED.3IONS-SCNC: 2 MMOL/L (ref 3–14)
AST SERPL W P-5'-P-CCNC: 19 U/L (ref 0–45)
BILIRUB SERPL-MCNC: 0.3 MG/DL (ref 0.2–1.3)
BUN SERPL-MCNC: 13 MG/DL (ref 7–30)
CALCIUM SERPL-MCNC: 9.2 MG/DL (ref 8.5–10.1)
CHLORIDE SERPL-SCNC: 106 MMOL/L (ref 94–109)
CO2 SERPL-SCNC: 28 MMOL/L (ref 20–32)
CREAT SERPL-MCNC: 0.79 MG/DL (ref 0.52–1.04)
GFR SERPL CREATININE-BSD FRML MDRD: >90 ML/MIN/{1.73_M2}
GLUCOSE SERPL-MCNC: 90 MG/DL (ref 70–99)
POTASSIUM SERPL-SCNC: 4.2 MMOL/L (ref 3.4–5.3)
PROT SERPL-MCNC: 7.8 G/DL (ref 6.8–8.8)
SODIUM SERPL-SCNC: 136 MMOL/L (ref 133–144)

## 2020-10-28 NOTE — TELEPHONE ENCOUNTER
"Patient calling with questions about type of CT scan tomorrow was ordered.  She started having some minor spotting this evening only when wiping and wanted to make sure that all gyn organs will be seen, as she has a history of \"uterus lining growing outside of uterus\" although not endometriosis.      Reviewed chart and reassured patient that CT was an abdominal pelvis and that would see everything.  Also encouraged patient to notify CT staff of spotting and GYN history, as it will aid the radiologist in reading her scan.    No further questions.  Encouraged patient to follow-up with clinic if has not heard from them by mid day Friday.    Suellen Packer RN on 10/27/2020 at 8:55 PM      Additional Information    [1] Follow-up call to recent contact AND [2] information only call, no triage required    Protocols used: INFORMATION ONLY CALL-A-AH      "

## 2020-10-30 NOTE — RESULT ENCOUNTER NOTE
Deajonathan Topete    Your test results are attached, feel free to contact me via [employer]     I am just checking in to see how you are feeling.  I have never received any results from your CT scan.  Did you get this done?  Any difference in how you are feeling?      Rodriguez Costa PA-C

## 2020-11-04 ENCOUNTER — MYC MEDICAL ADVICE (OUTPATIENT)
Dept: FAMILY MEDICINE | Facility: CLINIC | Age: 45
End: 2020-11-04

## 2020-11-04 NOTE — TELEPHONE ENCOUNTER
PN  Please see [employer] message  You have plenty of openings on 12/8 & 12/9. Triage can help her schedule whatever your preference is for f/u.    Thank you,  LG

## 2020-11-20 DIAGNOSIS — F41.1 GAD (GENERALIZED ANXIETY DISORDER): ICD-10-CM

## 2020-11-20 RX ORDER — BUPROPION HYDROCHLORIDE 100 MG/1
TABLET, EXTENDED RELEASE ORAL
Qty: 30 TABLET | Refills: 0 | Status: SHIPPED | OUTPATIENT
Start: 2020-11-20 | End: 2020-12-09

## 2020-11-20 NOTE — TELEPHONE ENCOUNTER
Bupropion:    One month supply sent 10/21  Medication is being filled for 1 time refill only due to:  Patient needs to be seen because due for physical in December. Last 12/18/2019.     Elizabeth Elder RN

## 2020-12-02 ENCOUNTER — MYC MEDICAL ADVICE (OUTPATIENT)
Dept: FAMILY MEDICINE | Facility: CLINIC | Age: 45
End: 2020-12-02

## 2020-12-02 DIAGNOSIS — F41.1 GAD (GENERALIZED ANXIETY DISORDER): ICD-10-CM

## 2020-12-02 DIAGNOSIS — G47.09 OTHER INSOMNIA: ICD-10-CM

## 2020-12-03 NOTE — TELEPHONE ENCOUNTER
PN,  Please see below MyChart message and advise.    Sertraline:  Prescription approved per [facility] [place] [place] Refill Protocol.    Trazodone:  Last Rx for 50mg   Patient noted at times she takes 100mg  Do you want to adjust directions?  Pended for 1-2 at HS PRN    Thanks,  Isabella PANCHAL RN

## 2020-12-04 RX ORDER — TRAZODONE HYDROCHLORIDE 50 MG/1
TABLET, FILM COATED ORAL
Qty: 180 TABLET | Refills: 0 | Status: SHIPPED | OUTPATIENT
Start: 2020-12-04 | End: 2020-12-09

## 2020-12-07 ENCOUNTER — ANCILLARY PROCEDURE (OUTPATIENT)
Dept: ULTRASOUND IMAGING | Facility: CLINIC | Age: 45
End: 2020-12-07
Attending: PHYSICIAN ASSISTANT
Payer: COMMERCIAL

## 2020-12-07 DIAGNOSIS — N83.202 CYST OF LEFT OVARY: ICD-10-CM

## 2020-12-07 PROCEDURE — 76856 US EXAM PELVIC COMPLETE: CPT | Performed by: OBSTETRICS & GYNECOLOGY

## 2020-12-07 PROCEDURE — 76830 TRANSVAGINAL US NON-OB: CPT | Performed by: OBSTETRICS & GYNECOLOGY

## 2020-12-09 ENCOUNTER — OFFICE VISIT (OUTPATIENT)
Dept: FAMILY MEDICINE | Facility: CLINIC | Age: 45
End: 2020-12-09
Payer: COMMERCIAL

## 2020-12-09 VITALS
RESPIRATION RATE: 14 BRPM | OXYGEN SATURATION: 100 % | BODY MASS INDEX: 24.48 KG/M2 | HEART RATE: 72 BPM | SYSTOLIC BLOOD PRESSURE: 119 MMHG | DIASTOLIC BLOOD PRESSURE: 74 MMHG | WEIGHT: 133 LBS | HEIGHT: 62 IN

## 2020-12-09 DIAGNOSIS — G47.09 OTHER INSOMNIA: ICD-10-CM

## 2020-12-09 DIAGNOSIS — F41.1 GAD (GENERALIZED ANXIETY DISORDER): ICD-10-CM

## 2020-12-09 DIAGNOSIS — N84.0 ENDOMETRIAL POLYP: ICD-10-CM

## 2020-12-09 DIAGNOSIS — R93.89 ABNORMAL PELVIC ULTRASOUND: Primary | ICD-10-CM

## 2020-12-09 PROCEDURE — 99213 OFFICE O/P EST LOW 20 MIN: CPT | Performed by: FAMILY MEDICINE

## 2020-12-09 RX ORDER — TRAZODONE HYDROCHLORIDE 50 MG/1
TABLET, FILM COATED ORAL
Qty: 180 TABLET | Refills: 1 | Status: SHIPPED | OUTPATIENT
Start: 2020-12-09 | End: 2021-08-23

## 2020-12-09 RX ORDER — BUPROPION HYDROCHLORIDE 100 MG/1
100 TABLET, EXTENDED RELEASE ORAL DAILY
Qty: 90 TABLET | Refills: 1 | Status: SHIPPED | OUTPATIENT
Start: 2020-12-09 | End: 2021-05-24

## 2020-12-09 ASSESSMENT — PAIN SCALES - GENERAL: PAINLEVEL: MODERATE PAIN (4)

## 2020-12-09 ASSESSMENT — MIFFLIN-ST. JEOR: SCORE: 1205.5

## 2020-12-09 ASSESSMENT — PATIENT HEALTH QUESTIONNAIRE - PHQ9: SUM OF ALL RESPONSES TO PHQ QUESTIONS 1-9: 4

## 2020-12-09 NOTE — PROGRESS NOTES
"Subjective     Yoselyn Mcgill is a 45 year old female who presents to clinic today for the following health issues:    HPI         Discuss ultrasound results from Monday at [facility], still having mild lower abdominal discomfort      Had acute episode of abdominal pain - see notes dated 10/27/20  Had breakthrough bleeding mid cycle  Came in to see JS - had CT ab/pelvis that showed a left ovarian cyst.     Menses - q28 days on the OCP   Lasting 4-5 bleeding - with low flow --   Has some dark blood/bleeding  Was evaluated by Gyn - Dr. Hutchison in the past and thought possibly adenomyosis based on symptoms.  Started on OCP and was doing better.     After the CT found cyst - a pelvic ultrasound was ordered and pt is here to f/u on results.   Ultrasound done on day 3 of cycle     LMP  - 12/5/20        Had appendix out in early 20's  Long hx of lower right pelvic pain  Connection to the back and abdominal musculature  Has eliminated coffee, gluten and other therapeutics        Review of Systems   Constitutional, HEENT, cardiovascular, pulmonary, GI, , musculoskeletal, neuro, skin, endocrine and psych systems are negative, except as otherwise noted.      Objective    /74 (BP Location: Left arm, Patient Position: Sitting, Cuff Size: Adult Regular)   Pulse 72   Resp 14   Ht 1.581 m (5' 2.25\")   Wt 60.3 kg (133 lb)   LMP 12/04/2020 (Approximate)   SpO2 100%   BMI 24.13 kg/m    Body mass index is 24.13 kg/m .  Physical Exam   GENERAL: healthy, alert and no distress  ABDOMEN: soft, nontender, no hepatosplenomegaly, no masses and bowel sounds normal    Impression: The uterus contains a small fibroid measuring less than 1 cm.  The endometrium measures 11.4 mm, appears heterogenous, with a small echogenic area measuring 0.4 x 0.6 x 0.4 cm.  The ovaries are normal.  No free fluid is seen in the pelvis.        Assessment & Plan     Abnormal pelvic ultrasound  Pt has endometrial echogenic area - <1cm - "   Discussed this may not be contributing to her chronic lower pelvic pain -   I will have her see gyn to evaluate and recommend further steps.  - OB/GYN REFERRAL    Endometrial polyp  Wonder if the echogenic focus is a polyp vs other?  - OB/GYN REFERRAL    YANIRA (generalized anxiety disorder)  Pt stable -   Refilled meds for 6 months   - buPROPion (WELLBUTRIN SR) 100 MG 12 hr tablet; Take 1 tablet (100 mg) by mouth daily  - sertraline (ZOLOFT) 50 MG tablet; TAKE 1/2 TABLET(25 MG) BY MOUTH DAILY    Other insomnia  Refill for 6 months  - traZODone (DESYREL) 50 MG tablet; TAKE 50MG - 100MG (1-2 TABLETS) BY MOUTH AT BEDTIME            Return in about 6 months (around 6/9/2021) for Physical Exam.    Elisabeth Powell, [facility]

## 2021-01-08 ENCOUNTER — OFFICE VISIT (OUTPATIENT)
Dept: OBGYN | Facility: CLINIC | Age: 46
End: 2021-01-08
Payer: COMMERCIAL

## 2021-01-08 ENCOUNTER — TELEPHONE (OUTPATIENT)
Dept: OBGYN | Facility: CLINIC | Age: 46
End: 2021-01-08

## 2021-01-08 VITALS
DIASTOLIC BLOOD PRESSURE: 74 MMHG | OXYGEN SATURATION: 97 % | WEIGHT: 132.8 LBS | BODY MASS INDEX: 24.44 KG/M2 | HEIGHT: 62 IN | SYSTOLIC BLOOD PRESSURE: 132 MMHG | HEART RATE: 98 BPM

## 2021-01-08 DIAGNOSIS — N84.0 ENDOMETRIUM, POLYP: ICD-10-CM

## 2021-01-08 DIAGNOSIS — Z80.3 FAMILY HISTORY OF MALIGNANT NEOPLASM OF BREAST: Primary | ICD-10-CM

## 2021-01-08 DIAGNOSIS — N83.202 CYST OF LEFT OVARY: ICD-10-CM

## 2021-01-08 PROCEDURE — 99214 OFFICE O/P EST MOD 30 MIN: CPT | Performed by: OBSTETRICS & GYNECOLOGY

## 2021-01-08 ASSESSMENT — MIFFLIN-ST. JEOR: SCORE: 1204.6

## 2021-01-08 NOTE — PROGRESS NOTES
I was asked to see patient Yoselyn Mcgill is a 45 year old female regarding possible endometrial polyp and ovarian cyst by Elisabeth Powell      HPI:  Yoselyn Mcgill is a 45 year old female is a   .  No LMP recorded.  Approximately 2 weeks ago patient had lower abdominal discomfort that was fairly severe so CT scan was ordered.  This showed a left ovarian cyst measuring 4 cm and so follow-up ultrasound was ordered for 2 weeks later.  Patient does note that she had some spotting on her OCP prior to the CT scan.    She has been on the same OCP dispense as written for adenomyosis and has been doing much better.  The pelvic ultrasound revealed resolution of the ovarian cyst but small endometrial polyp was seen.  Would like to know next steps.    Patients records are available and reviewed at today's visit.    Mother was diagnosed with breast cancer at age 50 so patient is wondering if she should be undergoing genetic testing?  Also complains of no libido at her age and is interested in ways to possibly help this.      Past Medical History:   Diagnosis Date     Anxiety state, unspecified     ECT [id number], multiple meds, off for 2 yrs.       HPV (human papilloma virus) infection        Past Surgical History:   Procedure Laterality Date     ANKLE SURGERY  08/10/11      SECTION N/A 2015    Procedure:  SECTION;  Surgeon: Emma Kendall MD;  Location:  L+D     ELECTROCONVULSIVE THERAPY  -    [facility] Dr Carpenter       Family History   Problem Relation Age of Onset     C.A.D. Mother      Hypertension Mother      Breast Cancer Mother 50     C.A.D. Maternal Grandfather      Hypertension Maternal Grandfather      Alcohol/Drug Maternal Grandfather      C.A.D. Paternal Grandfather      Depression Paternal Grandmother      Other - See Comments Father         hereditary hemochromotosis     Colon Cancer Maternal Aunt        Social History     Socioeconomic History     Marital status:       Spouse name: None     Number of children: 0     Years of education: None     Highest education level: None   Occupational History     Employer: UNEMPLOYED   Social Needs     Financial resource strain: None     Food insecurity     Worry: None     Inability: None     Transportation needs     Medical: None     Non-medical: None   Tobacco Use     Smoking status: Never Smoker     Smokeless tobacco: Never Used   Substance and Sexual Activity     Alcohol use: Yes     Alcohol/week: 0.0 standard drinks     Frequency: Monthly or less     Drinks per session: 1 or 2     Binge frequency: Never     Comment: socially     Drug use: No     Sexual activity: Yes     Partners: Male     Birth control/protection: Pill   Lifestyle     Physical activity     Days per week: None     Minutes per session: None     Stress: None   Relationships     Social connections     Talks on phone: None     Gets together: None     Attends [employer] service: None     Active member of club or organization: None     Attends meetings of clubs or organizations: None     Relationship status: None     Intimate partner violence     Fear of current or ex partner: None     Emotionally abused: None     Physically abused: None     Forced sexual activity: None   Other Topics Concern     Parent/sibling w/ CABG, MI or angioplasty before 65F 55M? Not Asked   Social History Narrative     None       Allergies: Amoxicillin    Current Outpatient Medications   Medication Sig Dispense Refill     azithromycin (ZITHROMAX) 500 MG tablet Take one tablet daily for up to 3 days as needed for traveler's diarrhea (Patient not taking: Reported on 2/14/2020) 3 tablet 0     BALZIVA 0.4-35 MG-MCG tablet Take 1 tablet by mouth daily Active tablets only for prevention of menses 112 tablet 3     buPROPion (WELLBUTRIN SR) 100 MG 12 hr tablet Take 1 tablet (100 mg) by mouth daily 90 tablet 1     LORazepam (ATIVAN) 0.5 MG tablet Take 1 tablet (0.5 mg) by mouth every 8 hours as needed  "for anxiety 20 tablet 0     Omega-3 Fatty Acids (OMEGA-3 FISH OIL PO)        Probiotic Product (PRO-BIOTIC BLEND PO)        sertraline (ZOLOFT) 50 MG tablet TAKE 1/2 TABLET(25 MG) BY MOUTH DAILY 45 tablet 1     traZODone (DESYREL) 50 MG tablet TAKE 50MG - 100MG (1-2 TABLETS) BY MOUTH AT BEDTIME 180 tablet 1     VITAMIN D, CHOLECALCIFEROL, PO Take by mouth daily         EXAM:  Blood pressure 132/74, pulse 98, height 1.581 m (5' 2.25\"), weight 60.2 kg (132 lb 12.8 oz), SpO2 97 %, not currently breastfeeding.  BMI= Body mass index is 24.09 kg/m .  No LMP recorded.  General - pleasant female in no acute distress.  Neurological - alert and oriented X 3  Psychiatric - normal mood and affect    No other physical examination was performed today as we spent over 50% of today's 30 minute visit in face-to-face discussion and counseling about ovarian cysts and endometrial polyps.  We spent approximately 5 minutes reviewing ultrasound still images in the room together showing a very small endometrial polyp and normal ovaries.    ASSESSMENT/PLAN:  (Z80.3) Family history of malignant neoplasm of breast  (primary encounter diagnosis)  Comment: Mother  Plan: CANCER RISK MGMT/CANCER GENETIC COUNSELING         REFERRAL        Referral to genetics was done so she can determine whether she wants BRCA testing or not.    Libido was also discussed and given a few suggestions to try.  Reassured she is not abnormal    (N84.0) Endometrium, polyp  Comment: Small  Plan: Case Request: HYSTEROSCOPY, WITH DILATION AND         CURETTAGE OF UTERUS USING MORCELLATOR        We specifically reviewed risk of bleeding, infection, possible uterine perferation and need to terminate surgery for possible fluid imbalance and need to repeat the procedure. Pt understands and agrees to proceed.    (N83.202) Cyst of left ovary  Comment: 4 cm, resolved  Plan: Discussed taking OCP continuously without placebo week to help prevent ovarian cyst formation.  Questions " answered.      A copy of the chart will be routed to the referring provider.    ELIZABETH HUDSON MD

## 2021-01-08 NOTE — Clinical Note
Hi Sarina! Thanks for sending your patient back to me. We are going to do continuous OCP to prevent ovarian cysts and hysteroscopy for the polyp. She will see you preop. :)  lenka

## 2021-01-08 NOTE — TELEPHONE ENCOUNTER
Type of surgery: gyn  [place] [facility] [place]  Date and time of surgery: 2/12/21 730a  Surgeon: Foster  Pre-Op Appt Date: 1/20/21  Post-Op Appt Date: n/a   Packet sent out: Yes  Pre-cert/Authorization completed:  No  Date: 01/08/21  Marylin Barroso, Surgery Coordinator

## 2021-01-08 NOTE — PATIENT INSTRUCTIONS
There are 2 ways to take the pill/patch/ring continuously:  1) take active tablets for 3 weeks,  then instead of one week off do another 1-2 months of active pill.   (ie you will be on active hormones for 6 or 9 weeks and then do 4-5 days off off so skipping periods)       2) you can take active tablets until you have red bleeding, then take 4-5 days off of hormone and restart.   Bleeding will happen at random times.      Marylin is surgery scheduler [phone]    For endometrial polyps we take them out with a water camera called a hysteroscopy.   It fills the uterine cavity with water and then we use a grasper while we can watch with the camera to remove the polyp.   It's outpatient--come in and go home same day.   You take Ibuprofen for cramping afterwards, but it's not a very painful procedure.  You are under IV anesthesia when I do it so will not remember anything, but breathing on your own.  You will need a  home and someone to stay with you for 24 hours.     The risks are risk of bleeding, infection, possible uterine perforation (or making a hole in the uterus--rare but can happen when dilating the cervix to make room for the loomis) and need to stop surgery for possible fluid imbalance and need to repeat the procedure.   The fluid imbalance should not happen during this type of water surgery--since the polyp is so small.   The fallopian tubes can drain into your abdomen, so we have a machine that measures fluid in and fluid out.  We want to make sure you don't get too much fluid in your belly.   The machine also removes the polyp material and then we send that to pathology for them to look under the microscope and make sure there is not a cancer or pre-cancer.  The results are back 3-5 working days after surgery and just get released on my chart. You are usually able to work the following day after surgery but will need to wear a pad for the drainage and bleeding for up to 7 days.

## 2021-01-20 ENCOUNTER — OFFICE VISIT (OUTPATIENT)
Dept: FAMILY MEDICINE | Facility: CLINIC | Age: 46
End: 2021-01-20
Payer: COMMERCIAL

## 2021-01-20 VITALS
WEIGHT: 133 LBS | SYSTOLIC BLOOD PRESSURE: 137 MMHG | BODY MASS INDEX: 24.48 KG/M2 | OXYGEN SATURATION: 98 % | HEART RATE: 80 BPM | RESPIRATION RATE: 16 BRPM | DIASTOLIC BLOOD PRESSURE: 88 MMHG | HEIGHT: 62 IN

## 2021-01-20 DIAGNOSIS — N84.0 ENDOMETRIAL POLYP: ICD-10-CM

## 2021-01-20 DIAGNOSIS — B35.4 TINEA CORPORIS: ICD-10-CM

## 2021-01-20 DIAGNOSIS — E83.110 HEREDITARY HEMOCHROMATOSIS (H): ICD-10-CM

## 2021-01-20 DIAGNOSIS — Z01.818 PREOP GENERAL PHYSICAL EXAM: Primary | ICD-10-CM

## 2021-01-20 LAB
ERYTHROCYTE [DISTWIDTH] IN BLOOD BY AUTOMATED COUNT: 12.3 % (ref 10–15)
FERRITIN SERPL-MCNC: 94 NG/ML (ref 8–252)
HCT VFR BLD AUTO: 39.5 % (ref 35–47)
HGB BLD-MCNC: 12.9 G/DL (ref 11.7–15.7)
IRON SATN MFR SERPL: 54 % (ref 15–46)
IRON SERPL-MCNC: 173 UG/DL (ref 35–180)
MCH RBC QN AUTO: 31.2 PG (ref 26.5–33)
MCHC RBC AUTO-ENTMCNC: 32.7 G/DL (ref 31.5–36.5)
MCV RBC AUTO: 95 FL (ref 78–100)
PLATELET # BLD AUTO: 221 10E9/L (ref 150–450)
RBC # BLD AUTO: 4.14 10E12/L (ref 3.8–5.2)
TIBC SERPL-MCNC: 322 UG/DL (ref 240–430)
WBC # BLD AUTO: 7 10E9/L (ref 4–11)

## 2021-01-20 PROCEDURE — 82728 ASSAY OF FERRITIN: CPT | Performed by: FAMILY MEDICINE

## 2021-01-20 PROCEDURE — 83540 ASSAY OF IRON: CPT | Performed by: FAMILY MEDICINE

## 2021-01-20 PROCEDURE — 85027 COMPLETE CBC AUTOMATED: CPT | Performed by: FAMILY MEDICINE

## 2021-01-20 PROCEDURE — 83550 IRON BINDING TEST: CPT | Performed by: FAMILY MEDICINE

## 2021-01-20 PROCEDURE — 36415 COLL VENOUS BLD VENIPUNCTURE: CPT | Performed by: FAMILY MEDICINE

## 2021-01-20 PROCEDURE — 99214 OFFICE O/P EST MOD 30 MIN: CPT | Performed by: FAMILY MEDICINE

## 2021-01-20 RX ORDER — CICLOPIROX OLAMINE 7.7 MG/G
CREAM TOPICAL 2 TIMES DAILY
Qty: 15 G | Refills: 0 | Status: SHIPPED | OUTPATIENT
Start: 2021-01-20 | End: 2021-05-10

## 2021-01-20 ASSESSMENT — MIFFLIN-ST. JEOR: SCORE: 1205.5

## 2021-01-20 NOTE — PROGRESS NOTES
[facility]  [address]  [facility] [address]  Phone: [phone]  Primary Provider: Elisabeth Powell      PREOPERATIVE EVALUATION:  Today's date: 1/20/2021    Yoselyn Mcgill is a 45 year old female who presents for a preoperative evaluation.    Surgical Information:  Surgery/Procedure: HYSTEROSCOPY, WITH DILATION AND CURETTAGE OF UTERUS USING MORCELLATOR  Surgery Location:  OR  Surgeon: Dr. Marybel Hutchison  Surgery Date: 2/12/2021  Time of Surgery: 7:30am  Where patient plans to recover: At home with family  Fax number for surgical facility: Note does not need to be faxed, will be available electronically in Epic.    Type of Anesthesia Anticipated: to be determined    Subjective     HPI related to upcoming procedure: heavy bleeding - endometrial polyp on ultrasound    Preop Questions 1/20/2021   1. Have you ever had a heart attack or stroke? No   2. Have you ever had surgery on your heart or blood vessels, such as a stent placement, a coronary artery bypass, or surgery on an artery in your head, neck, heart, or legs? No   3. Do you have chest pain with activity? No   4. Do you have a history of  heart failure? No   5. Do you currently have a cold, bronchitis or symptoms of other infection? No   6. Do you have a cough, shortness of breath, or wheezing? No   7. Do you or anyone in your family have previous history of blood clots? No   8. Do you or does anyone in your family have a serious bleeding problem such as prolonged bleeding following surgeries or cuts? No   9. Have you ever had problems with anemia or been told to take iron pills? No   10. Have you had any abnormal blood loss such as black, tarry or bloody stools, or abnormal vaginal bleeding? No   11. Have you ever had a blood transfusion? No   12. Are you willing to have a blood transfusion if it is medically needed before, during, or after your surgery? Yes   13. Have you or any of your relatives ever had  problems with anesthesia? No   14. Do you have sleep apnea, excessive snoring or daytime drowsiness? No   15. Do you have any artifical heart valves or other implanted medical devices like a pacemaker, defibrillator, or continuous glucose monitor? No   16. Do you have artificial joints? No   17. Are you allergic to latex? No   18. Is there any chance that you may be pregnant? No       Health Care Directive:  Patient does not have a Health Care Directive or Living Will:     Preoperative Review of :   reviewed - controlled substances reflected in medication list.         Review of Systems  CONSTITUTIONAL: NEGATIVE for fever, chills, change in weight  INTEGUMENTARY/SKIN: NEGATIVE for worrisome rashes, moles or lesions  EYES: NEGATIVE for vision changes or irritation  ENT/MOUTH: NEGATIVE for ear, mouth and throat problems  RESP: NEGATIVE for significant cough or SOB  BREAST: NEGATIVE for masses, tenderness or discharge  CV: NEGATIVE for chest pain, palpitations or peripheral edema  GI: NEGATIVE for nausea, abdominal pain, heartburn, or change in bowel habits  : NEGATIVE for frequency, dysuria, or hematuria  MUSCULOSKELETAL: NEGATIVE for significant arthralgias or myalgia  NEURO: NEGATIVE for weakness, dizziness or paresthesias  ENDOCRINE: NEGATIVE for temperature intolerance, skin/hair changes  HEME: NEGATIVE for bleeding problems  PSYCHIATRIC: NEGATIVE for changes in mood or affect    Patient Active Problem List    Diagnosis Date Noted     Endometrium, polyp 01/08/2021     Priority: Medium     Added automatically from request for surgery [id number]       Cyst of left ovary 01/08/2021     Priority: Medium     Added automatically from request for surgery [id number]       Hereditary hemochromatosis (H) 02/07/2017     Priority: Medium     Has one copy of the C282Y - heterozygote  Testing 2017  This patient is heterozygous for the C282Y mutation in the HFE gene. The    H63D and S65C mutations were not detected in this  sample. While C282Y   heterozygosity is associated with subtle changes in iron   metabolism(Ajith et al Gastroenterology 1998; 114:543-549), this   genotype does not appear to contribute significantly to iron   overload(Rl et al N Engl Med, 2008; 358:221-230).       Family history of hemochromatosis 09/30/2016     Priority: Medium     YANIRA (generalized anxiety disorder) 11/28/2014     Priority: Medium     Hx of anxiety with multiple medications, ECT, therapy.  Has not been on any antianxiety meds for 2 yrs and states doing well.  Restarted zoloft 2016- on  Added wellbutrin to the zoloft to counter side effects 2018   Has lorazepam - only uses once per month    Patient is followed by DAYAN LARSON for ongoing prescription of benzodiazepines.  All refills should be approved by this provider, or covering partner.    Medication(s): Ativan.   Maximum quantity per month:   Clinic visit frequency required:      Controlled substance agreement on file:   Benzodiazepine use reviewed by psychiatry:      [facility] website verification:  done on 10/23/2020  [email or web address]           Family history of malignant neoplasm of breast 04/11/2014     Priority: Medium     Mom diagnosed age 53 - treated with lumpectomy and radiation       Dysplasia of cervix 03/19/2013     Priority: Medium     2/2010 Pap LSIL  3/2010 [place] MATTY I, possible higher grade dysplasia  6/2010 [place] MATTY II-III  7/2010 LEEP MATTY II-III, endocervical margin + MATTY I                                                                                                                            11/2010 Pap NIL   PAP done 3/2011 and 6/2012 at [facility] - WNL  4/11/14: NIL pap, neg HPV Plan cotest pap & HPV in 1 year  (Hx of LEEP. Needs two consecutive cotests then a cotest in 3 years followed by routine screening for 20 years.  Repeat pap smear postpartum.  9/4/15: NIL pap, neg HPV. Plan cotest pap & HPV in 3 years.  08/07/18: NIL pap, Neg HR HPV result.  Plan routine screening.                                                                                                                                                                                                                                                                                                                                                                                                   CARDIOVASCULAR SCREENING; LDL GOAL LESS THAN 160 2013     Priority: Medium      Past Medical History:   Diagnosis Date     Anxiety state, unspecified     ECT [id number], multiple meds, off for 2 yrs.       HPV (human papilloma virus) infection      Past Surgical History:   Procedure Laterality Date     ANKLE SURGERY  08/10/11      SECTION N/A 2015    Procedure:  SECTION;  Surgeon: Emma Kendall MD;  [facility]     ELECTROCONVULSIVE THERAPY  -    [facility] Dr [facility]     Current Outpatient Medications   Medication Sig Dispense Refill     BALZIVA 0.4-35 MG-MCG tablet Take 1 tablet by mouth daily Active tablets only for prevention of menses 112 tablet 3     buPROPion (WELLBUTRIN SR) 100 MG 12 hr tablet Take 1 tablet (100 mg) by mouth daily 90 tablet 1     LORazepam (ATIVAN) 0.5 MG tablet Take 1 tablet (0.5 mg) by mouth every 8 hours as needed for anxiety 20 tablet 0     Omega-3 Fatty Acids (OMEGA-3 FISH OIL PO)        Probiotic Product (PRO-BIOTIC BLEND PO)        sertraline (ZOLOFT) 50 MG tablet TAKE 1/2 TABLET(25 MG) BY MOUTH DAILY 45 tablet 1     traZODone (DESYREL) 50 MG tablet TAKE 50MG - 100MG (1-2 TABLETS) BY MOUTH AT BEDTIME 180 tablet 1     VITAMIN D, CHOLECALCIFEROL, PO Take by mouth daily         Allergies   Allergen Reactions     Amoxicillin Rash        Social History     Tobacco Use     Smoking status: Never Smoker     Smokeless tobacco: Never Used   Substance Use Topics     Alcohol use: Yes     Alcohol/week: 0.0 standard drinks     Frequency: Monthly or  "less     Drinks per session: 1 or 2     Binge frequency: Never     Comment: socially       History   Drug Use No         Objective     /88   Pulse 80   Resp 16   Ht 1.581 m (5' 2.25\")   Wt 60.3 kg (133 lb)   SpO2 98%   BMI 24.13 kg/m      Physical Exam    GENERAL APPEARANCE: healthy, alert and no distress     EYES: EOMI, PERRL     HENT: ear canals and TM's normal and nose and mouth without ulcers or lesions     NECK: no adenopathy, no asymmetry, masses, or scars and thyroid normal to palpation     RESP: lungs clear to auscultation - no rales, rhonchi or wheezes     CV: regular rates and rhythm, normal S1 S2, no S3 or S4 and no murmur, click or rub     ABDOMEN:  soft, nontender, no HSM or masses and bowel sounds normal     MS: extremities normal- no gross deformities noted, no evidence of inflammation in joints, FROM in all extremities.     SKIN: no suspicious lesions or rashes     NEURO: Normal strength and tone, sensory exam grossly normal, mentation intact and speech normal     PSYCH: mentation appears normal. and affect normal/bright     LYMPHATICS: No cervical adenopathy    Recent Labs   Lab Test 10/27/20  1131 12/18/19  1034 05/01/19  1430   HGB 12.9  --  13.3     --  270    136  --    POTASSIUM 4.2 3.8  --    CR 0.79 0.76  --         Diagnostics:  Recent Results (from the past 24 hour(s))   CBC with platelets    Collection Time: 01/20/21  8:52 AM   Result Value Ref Range    WBC 7.0 4.0 - 11.0 10e9/L    RBC Count 4.14 3.8 - 5.2 10e12/L    Hemoglobin 12.9 11.7 - 15.7 g/dL    Hematocrit 39.5 35.0 - 47.0 %    MCV 95 78 - 100 fl    MCH 31.2 26.5 - 33.0 pg    MCHC 32.7 31.5 - 36.5 g/dL    RDW 12.3 10.0 - 15.0 %    Platelet Count 221 150 - 450 10e9/L      No EKG required, no history of coronary heart disease, significant arrhythmia, peripheral arterial disease or other structural heart disease.    Revised Cardiac Risk Index (RCRI):  The patient has the following serious cardiovascular risks " for perioperative complications:   - No serious cardiac risks = 0 points     RCRI Interpretation: 0 points: Class I (very low risk - 0.4% complication rate)            Assessment & Plan   The proposed surgical procedure is considered LOW risk.    Preop general physical exam     - CBC with platelets    Endometrial polyp     - CBC with platelets    Hereditary hemochromatosis (H)     - CBC with platelets  - Ferritin  - Iron and iron binding capacity    Tinea corporis     - ciclopirox (LOPROX) 0.77 % cream; Apply topically 2 times daily       Risks and Recommendations:  The patient has the following additional risks and recommendations for perioperative complications:   - No identified additional risk factors other than previously addressed    Medication Instructions:  Patient is to take all scheduled medications on the day of surgery    RECOMMENDATION:  APPROVAL GIVEN to proceed with proposed procedure, without further diagnostic evaluation.    Signed Electronically by: Elisabeth Powell DO    Copy of this evaluation report is provided to requesting physician.    Preop [facility] Preop Guidelines    Revised Cardiac Risk Index

## 2021-01-20 NOTE — PATIENT INSTRUCTIONS

## 2021-01-21 NOTE — RESULT ENCOUNTER NOTE
Dear Yoselyn,   Your test results are all back -   -Iron tests are mostly normal.  Lets plan to recheck in 6 months.  Let us know if you have any questions.  -Elsiabeth Powell, DO

## 2021-01-29 ENCOUNTER — TELEPHONE (OUTPATIENT)
Dept: FAMILY MEDICINE | Facility: CLINIC | Age: 46
End: 2021-01-29

## 2021-01-29 NOTE — TELEPHONE ENCOUNTER
"PN,    Patient called.  Scheduled for Hysteroscopy on 2/12    Has 2 questions:    1.  Has a headache today and asking if ok for her to take Ibuprofen or Tylenol.  \"I remember Dr. Powell saying something at my pre-op about not taking anything for 7 or 14 days prior to the surgery\"    2.  Today is day 26 of break through bleeding.  Dr. Hutchison.  informed her to skip placebo pills for birth control and only take active pills.  (See 1/8 office visit notes)    Asking is you think she should contact Dr. Hutchison to let her know this.     Elizabeth Elder RN    "

## 2021-01-29 NOTE — TELEPHONE ENCOUNTER
She can take tylenol up to the day of the surgery  Ibuprofen - hold for 3-5 days PRIOR to surgery  So she can take either at this time    Regarding bleeding -   Keep skipping placebo -   Let Dr. Hutchison know if worsens    PN

## 2021-02-01 ENCOUNTER — MYC MEDICAL ADVICE (OUTPATIENT)
Dept: OBGYN | Facility: CLINIC | Age: 46
End: 2021-02-01

## 2021-02-01 NOTE — TELEPHONE ENCOUNTER
Patient started taking active pills only to help prevent ovarian cyst formation. Has been having bleeding since last Wednesday (cycle day 25). She went straight from one pack of active pills to the next, but still bleeding. Do you want to make any adjustments or should patient continue to take active pills? Bethany Carpio RN

## 2021-02-01 NOTE — TELEPHONE ENCOUNTER
I accidentally closed this with all my clicking. :)    I would have her stop the active tablets right now and bleed, restart active tablets by Saturday and be on those for surgery.  No need to take pill day of surgery.    Let me know if that does not make sense  aMrybel Hutchison MD

## 2021-02-05 DIAGNOSIS — Z01.818 PRE-OP EXAM: Primary | ICD-10-CM

## 2021-02-05 DIAGNOSIS — N84.0 ENDOMETRIUM, POLYP: ICD-10-CM

## 2021-02-09 DIAGNOSIS — Z01.812 PRE-PROCEDURE LAB EXAM: ICD-10-CM

## 2021-02-09 DIAGNOSIS — Z01.818 PRE-OP EXAM: ICD-10-CM

## 2021-02-09 DIAGNOSIS — Z11.59 SCREENING FOR VIRAL DISEASE: ICD-10-CM

## 2021-02-09 DIAGNOSIS — Z01.812 PRE-PROCEDURE LAB EXAM: Primary | ICD-10-CM

## 2021-02-09 DIAGNOSIS — N84.0 ENDOMETRIUM, POLYP: ICD-10-CM

## 2021-02-09 LAB
ABO + RH BLD: NORMAL
ABO + RH BLD: NORMAL
BLD GP AB SCN SERPL QL: NORMAL
BLOOD BANK CMNT PATIENT-IMP: NORMAL
ERYTHROCYTE [DISTWIDTH] IN BLOOD BY AUTOMATED COUNT: 12.2 % (ref 10–15)
HCT VFR BLD AUTO: 38.6 % (ref 35–47)
HGB BLD-MCNC: 13 G/DL (ref 11.7–15.7)
MCH RBC QN AUTO: 31.9 PG (ref 26.5–33)
MCHC RBC AUTO-ENTMCNC: 33.7 G/DL (ref 31.5–36.5)
MCV RBC AUTO: 95 FL (ref 78–100)
PLATELET # BLD AUTO: 263 10E9/L (ref 150–450)
RBC # BLD AUTO: 4.07 10E12/L (ref 3.8–5.2)
SARS-COV-2 RNA RESP QL NAA+PROBE: NORMAL
SPECIMEN EXP DATE BLD: NORMAL
SPECIMEN SOURCE: NORMAL
WBC # BLD AUTO: 4.8 10E9/L (ref 4–11)

## 2021-02-09 PROCEDURE — U0005 INFEC AGEN DETEC AMPLI PROBE: HCPCS | Performed by: OBSTETRICS & GYNECOLOGY

## 2021-02-09 PROCEDURE — 86901 BLOOD TYPING SEROLOGIC RH(D): CPT | Performed by: OBSTETRICS & GYNECOLOGY

## 2021-02-09 PROCEDURE — U0003 INFECTIOUS AGENT DETECTION BY NUCLEIC ACID (DNA OR RNA); SEVERE ACUTE RESPIRATORY SYNDROME CORONAVIRUS 2 (SARS-COV-2) (CORONAVIRUS DISEASE [COVID-19]), AMPLIFIED PROBE TECHNIQUE, MAKING USE OF HIGH THROUGHPUT TECHNOLOGIES AS DESCRIBED BY CMS-2020-01-R: HCPCS | Performed by: OBSTETRICS & GYNECOLOGY

## 2021-02-09 PROCEDURE — 85027 COMPLETE CBC AUTOMATED: CPT | Performed by: OBSTETRICS & GYNECOLOGY

## 2021-02-09 PROCEDURE — 86850 RBC ANTIBODY SCREEN: CPT | Performed by: OBSTETRICS & GYNECOLOGY

## 2021-02-09 PROCEDURE — 86900 BLOOD TYPING SEROLOGIC ABO: CPT | Performed by: OBSTETRICS & GYNECOLOGY

## 2021-02-09 PROCEDURE — 36415 COLL VENOUS BLD VENIPUNCTURE: CPT | Performed by: OBSTETRICS & GYNECOLOGY

## 2021-02-10 LAB
LABORATORY COMMENT REPORT: NORMAL
SARS-COV-2 RNA RESP QL NAA+PROBE: NEGATIVE
SPECIMEN SOURCE: NORMAL

## 2021-02-11 NOTE — OP NOTE
Hysteroscopy with D&C    Preoperative diagnosis:  Endometrial polyp  Postoperative diagnosis:  Same  Procedure:  EUA, hysteroscopy, dilation and curettage  Surgeon:  ELIZABETH HUDSON MD  Assistant: Nadira Wright MD PGY1  Anesthesia:  MAC and paracervical block  Specimens: Endometrial curettings  Complications: none    EBL:  5mL  IVF:  500mL  UOP:  None  Fluid deficit:  70mL    Findings:  Exam under anesthesia revealed normal cervix, anteverted uterus, no adnexal masses. Hysteroscopy revealed fluffy polypoid tissue on anterior uterus with one small polyp in the uterine cavity, normal tubal ostia visualized bilaterally.    Indications:  Yoselyn Mcgill is a 45 year old  with pelvic pain. Pelvic ultrasound showed endometrial cavity was heterogeneous with echogenic focus, consistent with polyp.  Risks, benefits, and alternatives to the procedure were discussed with the patient who elected to proceed.  All questions were answered and an informed consent was obtained.    Procedure:  The patient was taken to the operating room where she underwent MAC anesthesia without difficulty.  She was placed in a dorsal lithotomy position using Yellow fin stirrups.  The patient was examined for the above noted findings and then prepped and draped in the usual sterile fashion.  A medium graves speculum was inserted into the vagina. 2cc 1% plain lidocain was injected into the cervix at 12 o'clock position. A tenaculum was placed on the anterior cervical lip.  A paracervical block was administered with an additional 18cc 1% plain lidocaine at the 4 and 8 o'clock positions. The endocervical canal was serially dilated to 6.5 mm using Hegar dilators. The hysteroscope was inserted without difficulty with the above findings noted. The Myosure Lite was used to remove polypoid tissue and perform endometrial sampling of all uterine surfaces. The hysteroscope was removed.  The tissue was sent to pathology. All instruments were  then removed.  The tenaculum was removed from the cervix and the puncture sites were hemostatic.  The patient was repositioned to the supine position.  The patient tolerated the procedure well and was taken to the recovery room in stable condition.  Dr. Hutchison was scrubbed and present for the entire procedure.    Emma Gibbs MD  OBGYN Resident PGY4  02/12/2021 8:26 AM     I was present and scrubbed for entirety of the surgical case and  agree with note as edited to reflect findings.      ELIZABETH HUTCHISON MD

## 2021-02-12 ENCOUNTER — ANESTHESIA (OUTPATIENT)
Dept: SURGERY | Facility: CLINIC | Age: 46
End: 2021-02-12
Payer: COMMERCIAL

## 2021-02-12 ENCOUNTER — HOSPITAL ENCOUNTER (OUTPATIENT)
Facility: CLINIC | Age: 46
Discharge: HOME | End: 2021-02-12
Attending: OBSTETRICS & GYNECOLOGY | Admitting: OBSTETRICS & GYNECOLOGY
Payer: COMMERCIAL

## 2021-02-12 ENCOUNTER — ANESTHESIA EVENT (OUTPATIENT)
Dept: SURGERY | Facility: CLINIC | Age: 46
End: 2021-02-12
Payer: COMMERCIAL

## 2021-02-12 VITALS
BODY MASS INDEX: 24.61 KG/M2 | TEMPERATURE: 98.4 F | RESPIRATION RATE: 18 BRPM | SYSTOLIC BLOOD PRESSURE: 110 MMHG | DIASTOLIC BLOOD PRESSURE: 69 MMHG | WEIGHT: 138.89 LBS | HEART RATE: 67 BPM | OXYGEN SATURATION: 99 % | HEIGHT: 63 IN

## 2021-02-12 DIAGNOSIS — N84.0 ENDOMETRIUM, POLYP: Primary | ICD-10-CM

## 2021-02-12 DIAGNOSIS — N84.0 ENDOMETRIUM, POLYP: ICD-10-CM

## 2021-02-12 DIAGNOSIS — N83.202 CYST OF LEFT OVARY: ICD-10-CM

## 2021-02-12 LAB
GLUCOSE BLDC GLUCOMTR-MCNC: 84 MG/DL (ref 70–99)
HCG UR QL: NEGATIVE

## 2021-02-12 PROCEDURE — 999N001017 HC STATISTIC GLUCOSE BY METER IP

## 2021-02-12 PROCEDURE — 88305 TISSUE EXAM BY PATHOLOGIST: CPT | Mod: 26 | Performed by: PATHOLOGY

## 2021-02-12 PROCEDURE — 710N000012 HC RECOVERY PHASE 2, PER MINUTE: Performed by: OBSTETRICS & GYNECOLOGY

## 2021-02-12 PROCEDURE — 250N000009 HC RX 250: Performed by: NURSE ANESTHETIST, CERTIFIED REGISTERED

## 2021-02-12 PROCEDURE — 250N000011 HC RX IP 250 OP 636: Performed by: NURSE ANESTHETIST, CERTIFIED REGISTERED

## 2021-02-12 PROCEDURE — 250N000009 HC RX 250: Performed by: OBSTETRICS & GYNECOLOGY

## 2021-02-12 PROCEDURE — 370N000017 HC ANESTHESIA TECHNICAL FEE, PER MIN: Performed by: OBSTETRICS & GYNECOLOGY

## 2021-02-12 PROCEDURE — 88305 TISSUE EXAM BY PATHOLOGIST: CPT | Mod: TC | Performed by: OBSTETRICS & GYNECOLOGY

## 2021-02-12 PROCEDURE — 258N000003 HC RX IP 258 OP 636: Performed by: NURSE ANESTHETIST, CERTIFIED REGISTERED

## 2021-02-12 PROCEDURE — 999N000141 HC STATISTIC PRE-PROCEDURE NURSING ASSESSMENT: Performed by: OBSTETRICS & GYNECOLOGY

## 2021-02-12 PROCEDURE — 81025 URINE PREGNANCY TEST: CPT | Performed by: ANESTHESIOLOGY

## 2021-02-12 PROCEDURE — 272N000001 HC OR GENERAL SUPPLY STERILE: Performed by: OBSTETRICS & GYNECOLOGY

## 2021-02-12 PROCEDURE — 360N000076 HC SURGERY LEVEL 3, PER MIN: Performed by: OBSTETRICS & GYNECOLOGY

## 2021-02-12 PROCEDURE — 58558 HYSTEROSCOPY BIOPSY: CPT | Mod: GC | Performed by: OBSTETRICS & GYNECOLOGY

## 2021-02-12 PROCEDURE — 250N000011 HC RX IP 250 OP 636: Performed by: ANESTHESIOLOGY

## 2021-02-12 RX ORDER — ONDANSETRON 2 MG/ML
4 INJECTION INTRAMUSCULAR; INTRAVENOUS EVERY 30 MIN PRN
Status: DISCONTINUED | OUTPATIENT
Start: 2021-02-12 | End: 2021-02-12 | Stop reason: HOSPADM

## 2021-02-12 RX ORDER — NALOXONE HYDROCHLORIDE 0.4 MG/ML
0.4 INJECTION, SOLUTION INTRAMUSCULAR; INTRAVENOUS; SUBCUTANEOUS
Status: DISCONTINUED | OUTPATIENT
Start: 2021-02-12 | End: 2021-02-12 | Stop reason: HOSPADM

## 2021-02-12 RX ORDER — IBUPROFEN 600 MG/1
600 TABLET, FILM COATED ORAL EVERY 6 HOURS PRN
Qty: 30 TABLET | Refills: 0 | Status: SHIPPED | OUTPATIENT
Start: 2021-02-12

## 2021-02-12 RX ORDER — ONDANSETRON 2 MG/ML
INJECTION INTRAMUSCULAR; INTRAVENOUS PRN
Status: DISCONTINUED | OUTPATIENT
Start: 2021-02-12 | End: 2021-02-12

## 2021-02-12 RX ORDER — FENTANYL CITRATE 50 UG/ML
25-50 INJECTION, SOLUTION INTRAMUSCULAR; INTRAVENOUS
Status: DISCONTINUED | OUTPATIENT
Start: 2021-02-12 | End: 2021-02-12 | Stop reason: HOSPADM

## 2021-02-12 RX ORDER — ONDANSETRON 4 MG/1
4-8 TABLET, ORALLY DISINTEGRATING ORAL EVERY 8 HOURS PRN
Qty: 4 TABLET | Refills: 0 | Status: SHIPPED | OUTPATIENT
Start: 2021-02-12 | End: 2021-05-10

## 2021-02-12 RX ORDER — IBUPROFEN 600 MG/1
600 TABLET, FILM COATED ORAL
Status: DISCONTINUED | OUTPATIENT
Start: 2021-02-12 | End: 2021-02-12 | Stop reason: HOSPADM

## 2021-02-12 RX ORDER — DEXAMETHASONE SODIUM PHOSPHATE 4 MG/ML
INJECTION, SOLUTION INTRA-ARTICULAR; INTRALESIONAL; INTRAMUSCULAR; INTRAVENOUS; SOFT TISSUE PRN
Status: DISCONTINUED | OUTPATIENT
Start: 2021-02-12 | End: 2021-02-12

## 2021-02-12 RX ORDER — PROPOFOL 10 MG/ML
INJECTION, EMULSION INTRAVENOUS PRN
Status: DISCONTINUED | OUTPATIENT
Start: 2021-02-12 | End: 2021-02-12

## 2021-02-12 RX ORDER — LIDOCAINE 40 MG/G
CREAM TOPICAL
Status: DISCONTINUED | OUTPATIENT
Start: 2021-02-12 | End: 2021-02-12 | Stop reason: HOSPADM

## 2021-02-12 RX ORDER — SODIUM CHLORIDE, SODIUM LACTATE, POTASSIUM CHLORIDE, CALCIUM CHLORIDE 600; 310; 30; 20 MG/100ML; MG/100ML; MG/100ML; MG/100ML
INJECTION, SOLUTION INTRAVENOUS CONTINUOUS
Status: DISCONTINUED | OUTPATIENT
Start: 2021-02-12 | End: 2021-02-12 | Stop reason: HOSPADM

## 2021-02-12 RX ORDER — KETOROLAC TROMETHAMINE 30 MG/ML
INJECTION, SOLUTION INTRAMUSCULAR; INTRAVENOUS PRN
Status: DISCONTINUED | OUTPATIENT
Start: 2021-02-12 | End: 2021-02-12

## 2021-02-12 RX ORDER — NALOXONE HYDROCHLORIDE 0.4 MG/ML
0.2 INJECTION, SOLUTION INTRAMUSCULAR; INTRAVENOUS; SUBCUTANEOUS
Status: DISCONTINUED | OUTPATIENT
Start: 2021-02-12 | End: 2021-02-12 | Stop reason: HOSPADM

## 2021-02-12 RX ORDER — PROPOFOL 10 MG/ML
INJECTION, EMULSION INTRAVENOUS CONTINUOUS PRN
Status: DISCONTINUED | OUTPATIENT
Start: 2021-02-12 | End: 2021-02-12

## 2021-02-12 RX ORDER — ONDANSETRON 4 MG/1
4 TABLET, ORALLY DISINTEGRATING ORAL EVERY 30 MIN PRN
Status: DISCONTINUED | OUTPATIENT
Start: 2021-02-12 | End: 2021-02-12 | Stop reason: HOSPADM

## 2021-02-12 RX ORDER — AMOXICILLIN 250 MG
1-2 CAPSULE ORAL 2 TIMES DAILY
Qty: 30 TABLET | Refills: 0 | Status: SHIPPED | OUTPATIENT
Start: 2021-02-12 | End: 2021-05-10

## 2021-02-12 RX ORDER — LIDOCAINE HYDROCHLORIDE 20 MG/ML
INJECTION, SOLUTION INFILTRATION; PERINEURAL PRN
Status: DISCONTINUED | OUTPATIENT
Start: 2021-02-12 | End: 2021-02-12

## 2021-02-12 RX ORDER — MEPERIDINE HYDROCHLORIDE 25 MG/ML
12.5 INJECTION INTRAMUSCULAR; INTRAVENOUS; SUBCUTANEOUS
Status: DISCONTINUED | OUTPATIENT
Start: 2021-02-12 | End: 2021-02-12 | Stop reason: HOSPADM

## 2021-02-12 RX ORDER — SODIUM CHLORIDE, SODIUM LACTATE, POTASSIUM CHLORIDE, CALCIUM CHLORIDE 600; 310; 30; 20 MG/100ML; MG/100ML; MG/100ML; MG/100ML
INJECTION, SOLUTION INTRAVENOUS CONTINUOUS PRN
Status: DISCONTINUED | OUTPATIENT
Start: 2021-02-12 | End: 2021-02-12

## 2021-02-12 RX ADMIN — LIDOCAINE HYDROCHLORIDE 100 MG: 20 INJECTION, SOLUTION INFILTRATION; PERINEURAL at 07:54

## 2021-02-12 RX ADMIN — MIDAZOLAM 2 MG: 1 INJECTION INTRAMUSCULAR; INTRAVENOUS at 07:51

## 2021-02-12 RX ADMIN — HYDROMORPHONE HYDROCHLORIDE 0.25 MG: 1 INJECTION, SOLUTION INTRAMUSCULAR; INTRAVENOUS; SUBCUTANEOUS at 08:04

## 2021-02-12 RX ADMIN — ONDANSETRON 4 MG: 2 INJECTION INTRAMUSCULAR; INTRAVENOUS at 08:19

## 2021-02-12 RX ADMIN — KETOROLAC TROMETHAMINE 30 MG: 30 INJECTION, SOLUTION INTRAMUSCULAR at 07:51

## 2021-02-12 RX ADMIN — SODIUM CHLORIDE, POTASSIUM CHLORIDE, SODIUM LACTATE AND CALCIUM CHLORIDE: 600; 310; 30; 20 INJECTION, SOLUTION INTRAVENOUS at 07:51

## 2021-02-12 RX ADMIN — PROPOFOL 30 MG: 10 INJECTION, EMULSION INTRAVENOUS at 07:59

## 2021-02-12 RX ADMIN — HYDROMORPHONE HYDROCHLORIDE 0.25 MG: 1 INJECTION, SOLUTION INTRAMUSCULAR; INTRAVENOUS; SUBCUTANEOUS at 07:57

## 2021-02-12 RX ADMIN — PROPOFOL 30 MG: 10 INJECTION, EMULSION INTRAVENOUS at 07:55

## 2021-02-12 RX ADMIN — FENTANYL CITRATE 25 MCG: 50 INJECTION, SOLUTION INTRAMUSCULAR; INTRAVENOUS at 08:44

## 2021-02-12 RX ADMIN — DEXAMETHASONE SODIUM PHOSPHATE 4 MG: 4 INJECTION, SOLUTION INTRAMUSCULAR; INTRAVENOUS at 08:19

## 2021-02-12 RX ADMIN — PROPOFOL 120 MCG/KG/MIN: 10 INJECTION, EMULSION INTRAVENOUS at 07:55

## 2021-02-12 RX ADMIN — PROPOFOL 30 MG: 10 INJECTION, EMULSION INTRAVENOUS at 08:04

## 2021-02-12 ASSESSMENT — MIFFLIN-ST. JEOR: SCORE: 1244.13

## 2021-02-12 NOTE — ANESTHESIA POSTPROCEDURE EVALUATION
Patient: Yoselyn Mcgill    Procedure(s):  HYSTEROSCOPY, WITH DILATION AND CURETTAGE OF UTERUS USING MORCELLATOR    Diagnosis:Endometrium, polyp [N84.0]  Cyst of left ovary [N83.202]  Diagnosis Additional Information: No value filed.    Anesthesia Type:  MAC    Note:  Disposition: Outpatient   Postop Pain Control: Uneventful            Sign Out: Well controlled pain   PONV: No   Neuro/Psych: Uneventful            Sign Out: Acceptable/Baseline neuro status   Airway/Respiratory: Uneventful            Sign Out: Acceptable/Baseline resp. status   CV/Hemodynamics: Uneventful            Sign Out: Acceptable CV status   Other NRE:    DID A NON-ROUTINE EVENT OCCUR?          Last vitals:  Vitals:    02/12/21 0845 02/12/21 0900 02/12/21 0915   BP: 110/68  110/69   Pulse: 67     Resp: 16 16 18   Temp: 36.4  C (97.5  F)  36.9  C (98.4  F)   SpO2: 97%  99%       Last vitals prior to Anesthesia Care Transfer:  CRNA VITALS  2/12/2021 0759 - 2/12/2021 0859      2/12/2021             NIBP:  102/69    Ht Rate:  62          Electronically Signed By: Cole Saha MD  February 12, 2021  11:00 AM

## 2021-02-12 NOTE — DISCHARGE INSTRUCTIONS
Nothing in the vagina until the bleeding stops.    Use Ibuprofen for cramping, up to 800mg (4 of the over the counter tablets) every 8 hours.    What happens after hysteroscopy?    You may have cramps and bleeding for 24 hours after the procedure. This is normal. Use pads instead of tampons.    Do not douche or use tampons until your health care provider says it s OK.    Do not use any vaginal medicines until you are told it s OK.    Ask your health care provider when it s OK to have sex again.  When should I call my health care provider?  Call your health care provider if you have:    Heavy bleeding (more than 1 pad an hour for 2 or more hours)    A fever over 100.4 F (38.0 C)    Increasing abdominal pain or tenderness    Foul-smelling discharge  Follow-up care  Schedule a follow-up visit with your health care provider. Based on the results of your test, you may need more treatment. Be sure to follow instructions and keep your appointments.    Important numbers  [facility] ([address]) [facility]: [phone]   [facility] ([address]) : [phone]    Same-Day Surgery   Adult Discharge Orders & Instructions     For 24 hours after surgery:  1. Get plenty of rest.  A responsible adult must stay with you for at least 24 hours after you leave the hospital.   2. Pain medication can slow your reflexes. Do not drive or use heavy equipment.  If you have weakness or tingling, don't drive or use heavy equipment until this feeling goes away.  3. Mixing alcohol and pain medication can cause dizziness and slow your breathing. It can even be fatal. Do not drink alcohol while taking pain medication.  4. Avoid strenuous or risky activities.  Ask for help when climbing stairs.   5. You may feel lightheaded.  If so, sit for a few minutes before standing.  Have someone help you get up.   6. If you have nausea (feel sick to your stomach), drink only clear liquids such as apple juice,  ginger ale, broth or 7-Up.  Rest may also help.  Be sure to drink enough fluids.  Move to a regular diet as you feel able. Take pain medications with a small amount of solid food, such as toast or crackers, to avoid nausea.   7. A slight fever is normal. Call the doctor if your fever is over 100 F (37.7 C) (taken under the tongue) or lasts longer than 24 hours.  8. You may have a dry mouth, muscle aches, trouble sleeping or a sore throat.  These symptoms should go away after 24 hours.  9. Do not make important or legal decisions.   Pain Management:      1. Take pain medication (if prescribed) for pain as directed by your physician.        2. WARNING: If the pain medication you have been prescribed contains Tylenol  (acetaminophen), DO NOT take additional doses of Tylenol (acetaminophen).     Call your doctor for any of the followin.  Signs of infection (fever, growing tenderness at the surgery site, severe pain, a large amount of drainage or bleeding, foul-smelling drainage, redness, swelling).    2.  It has been over 8 to 10 hours since surgery and you are still not able to urinate (pee).    3.  Headache for over 24 hours.    4.  Numbness, tingling or weakness the day after surgery (if you had spinal anesthesia).  To contact a doctor, call Dr. Hutchison Office [phone]  or:      [phone] and ask for the Resident On Call for:           DEDE (answered 24 hours a day)      Emergency Department:  [place] [facility]: [phone]  [place] [facility]: [phone]

## 2021-02-12 NOTE — ANESTHESIA PROCEDURE NOTES
Airway    Patient location during procedure: OR  Staff -   CRNA: Susanne Alas APRN CRNA  Performed By: CRNA    Indications and Patient Condition  Indications for airway management: yudelka-procedural  Induction type:intravenousMask difficulty assessment: 0 - not attempted    Final Airway Details  Final airway type: mask        Post intubation assessment   Number of other approaches attempted: 1 (oral airway # 8)  Dentition: Unchanged

## 2021-02-12 NOTE — ANESTHESIA PREPROCEDURE EVALUATION
Anesthesia Pre-Procedure Evaluation    Patient: Yoselyn Mcgill   MRN: [id number] : 1975        Preoperative Diagnosis: Endometrium, polyp [N84.0]  Cyst of left ovary [N83.202]   Procedure : Procedure(s):  HYSTEROSCOPY, WITH DILATION AND CURETTAGE OF UTERUS USING MORCELLATOR     Past Medical History:   Diagnosis Date     Anxiety state, unspecified     ECT [id number], multiple meds, off for 2 yrs.       HPV (human papilloma virus) infection       Past Surgical History:   Procedure Laterality Date     ANKLE SURGERY  08/10/11      SECTION N/A 2015    Procedure:  SECTION;  Surgeon: Emma Kendall MD;  [facility]     ELECTROCONVULSIVE THERAPY  -    [facility] Dr [facility]      Allergies   Allergen Reactions     Amoxicillin Rash      Social History     Tobacco Use     Smoking status: Never Smoker     Smokeless tobacco: Never Used   Substance Use Topics     Alcohol use: Yes     Alcohol/week: 0.0 standard drinks     Frequency: Monthly or less     Drinks per session: 1 or 2     Binge frequency: Never     Comment: socially      Wt Readings from Last 1 Encounters:   21 63 kg (138 lb 14.2 oz)           Physical Exam    Airway        Mallampati: II   TM distance: > 3 FB   Neck ROM: full   Mouth opening: > 3 cm    Respiratory Devices and Support         Dental  no notable dental history         Cardiovascular   cardiovascular exam normal       Rhythm and rate: regular     Pulmonary   pulmonary exam normal        breath sounds clear to auscultation           OUTSIDE LABS:  CBC:   Lab Results   Component Value Date    WBC 4.8 2021    WBC 7.0 2021    HGB 13.0 2021    HGB 12.9 2021    HCT 38.6 2021    HCT 39.5 2021     2021     2021     BMP:   Lab Results   Component Value Date     10/27/2020     2019    POTASSIUM 4.2 10/27/2020    POTASSIUM 3.8 2019    CHLORIDE 106 10/27/2020    CHLORIDE 104  12/18/2019    CO2 28 10/27/2020    CO2 28 12/18/2019    BUN 13 10/27/2020    BUN 15 12/18/2019    CR 0.79 10/27/2020    CR 0.76 12/18/2019    GLC 90 10/27/2020    GLC 89 12/18/2019     COAGS: No results found for: PTT, INR, FIBR  POC:   Lab Results   Component Value Date    BGM 84 02/12/2021    HCG Negative 02/12/2021     HEPATIC:   Lab Results   Component Value Date    ALBUMIN 3.6 10/27/2020    PROTTOTAL 7.8 10/27/2020    ALT 22 10/27/2020    AST 19 10/27/2020    ALKPHOS 49 10/27/2020    BILITOTAL 0.3 10/27/2020     OTHER:   Lab Results   Component Value Date    CHUCK 9.2 10/27/2020    TSH 0.76 05/01/2019       Anesthesia Plan    ASA Status:  2      Anesthesia Type: MAC.     - Reason for MAC: Deep or markedly invasive procedure (G8)              Consents    Anesthesia Plan(s) and associated risks, benefits, and realistic alternatives discussed. Questions answered and patient/representative(s) expressed understanding.     - Discussed with:  Patient    Use of blood products discussed: Yes.     - Discussed with: Patient.     Postoperative Care    Pain management: Oral pain medications, IV analgesics.   PONV prophylaxis: Ondansetron (or other 5HT-3)     Comments:                Cole Saha MD

## 2021-02-12 NOTE — ANESTHESIA CARE TRANSFER NOTE
Patient: Yoselyn Mcgill    Procedure(s):  HYSTEROSCOPY, WITH DILATION AND CURETTAGE OF UTERUS USING MORCELLATOR    Diagnosis: Endometrium, polyp [N84.0]  Cyst of left ovary [N83.202]  Diagnosis Additional Information: No value filed.    Anesthesia Type:   MAC     Note:    Oropharynx: spontaneously breathing  Level of Consciousness: awake  Oxygen Supplementation: room air    Independent Airway: airway patency satisfactory and stable  Dentition: dentition unchanged  Vital Signs Stable: post-procedure vital signs reviewed and stable  Report to RN Given: handoff report given  Patient transferred to: Phase II  Comments: Report to RN, VSS intraop record.  Temp 36.8 C po  TF with O2 FM, then discontinue to RA in Ph II  Handoff Report: Identifed the Patient, Identified the Reponsible Provider, Reviewed the pertinent medical history, Discussed the surgical course, Reviewed Intra-OP anesthesia mangement and issues during anesthesia, Set expectations for post-procedure period and Allowed opportunity for questions and acknowledgement of understanding      Vitals: (Last set prior to Anesthesia Care Transfer)  CRNA VITALS  2/12/2021 0759 - 2/12/2021 0840      2/12/2021             NIBP:  102/69    Ht Rate:  62        Electronically Signed By: JANICE Greenberg CRNA  February 12, 2021  8:40 AM

## 2021-02-15 LAB — COPATH REPORT: NORMAL

## 2021-02-20 ENCOUNTER — HEALTH MAINTENANCE LETTER (OUTPATIENT)
Age: 46
End: 2021-02-20

## 2021-03-01 DIAGNOSIS — Z30.011 ORAL CONTRACEPTIVE PRESCRIBED: ICD-10-CM

## 2021-03-03 RX ORDER — NORETHINDRONE AND ETHINYL ESTRADIOL 0.4-0.035
1 KIT ORAL DAILY
Qty: 112 TABLET | Refills: 0 | Status: SHIPPED | OUTPATIENT
Start: 2021-03-03 | End: 2021-03-05

## 2021-03-03 NOTE — TELEPHONE ENCOUNTER
Contraceptives Protocol Huvzga1403/01/2021 07:39 PM   Patient is not a current smoker if age is 35 or older Protocol Details    Recent (12 mo) or future (30 days) visit within the authorizing provider's specialty     Medication is active on med list     No active pregnancy on record     No positive pregnancy test in past 12 months    Hormone Replacement Therapy Passed   Blood pressure under 140/90 in past 12 months Protocol Details    Recent (12 mo) or future (30 days) visit within the authorizing provider's specialty     Medication is active on med list     Patient is 18 years of age or older     No active pregnancy on record     No positive pregnancy test on record in past 12 months

## 2021-03-04 ENCOUNTER — MYC MEDICAL ADVICE (OUTPATIENT)
Dept: OBGYN | Facility: CLINIC | Age: 46
End: 2021-03-04

## 2021-03-04 DIAGNOSIS — Z30.011 ORAL CONTRACEPTIVE PRESCRIBED: ICD-10-CM

## 2021-03-05 RX ORDER — NORETHINDRONE AND ETHINYL ESTRADIOL 0.4-0.035
1 KIT ORAL DAILY
Qty: 112 TABLET | Refills: 0 | Status: SHIPPED | OUTPATIENT
Start: 2021-03-05 | End: 2021-06-01

## 2021-05-03 ENCOUNTER — MYC MEDICAL ADVICE (OUTPATIENT)
Dept: FAMILY MEDICINE | Facility: CLINIC | Age: 46
End: 2021-05-03

## 2021-05-03 DIAGNOSIS — M79.672 LEFT FOOT PAIN: Primary | ICD-10-CM

## 2021-05-04 NOTE — TELEPHONE ENCOUNTER
PN,  Please see below Rocketskates[employer] message and advise.  Appt with you? Podiatry?  Thanks,  Isabella PANCHAL RN

## 2021-05-10 ENCOUNTER — OFFICE VISIT (OUTPATIENT)
Dept: PODIATRY | Facility: CLINIC | Age: 46
End: 2021-05-10
Payer: COMMERCIAL

## 2021-05-10 VITALS
DIASTOLIC BLOOD PRESSURE: 78 MMHG | SYSTOLIC BLOOD PRESSURE: 110 MMHG | WEIGHT: 138 LBS | BODY MASS INDEX: 24.45 KG/M2 | HEIGHT: 63 IN

## 2021-05-10 DIAGNOSIS — M77.42 METATARSALGIA, LEFT FOOT: Primary | ICD-10-CM

## 2021-05-10 PROCEDURE — 99203 OFFICE O/P NEW LOW 30 MIN: CPT | Performed by: PODIATRIST

## 2021-05-10 ASSESSMENT — MIFFLIN-ST. JEOR: SCORE: 1240.09

## 2021-05-10 NOTE — PATIENT INSTRUCTIONS
Thank you for choosing [facility] Podiatry / Foot & Ankle Surgery!    DR TRIPATHI[facility]S CLINIC LOCATIONS  [facility]   [address] [facility] [id number]   [place] [address] [place] [address] [phone]       SET UP SURGERY: [phone]    APPOINTMENTS: [phone]    BILLING QUESTIONS: [phone]    FAX NUMBER: [phone]        Follow up: as needed      PRICE THERAPY  Many aches and pains throughout the foot and ankle can be helped with many simple treatments. This is usually described as PRICE Therapy.      P - Protection - often times, inflammation/pain in the lower extremity is not able to improve simply because the areas involved are never allowed to rest. Every step we take can bother the problematic area. Protecting those areas is an important step in the healing process. This may involve a walking cast boot, a special insert/orthotic device, an ankle brace, or simply avoiding barefoot walking.    R - Rest - in addition to protecting the foot/ankle, resting is an important, but often times difficult, treatment option. Getting off your feet when they bother you, and specifically avoiding activities that cause pain/discomfort, are very beneficial to prevent, and treat, foot/ankle pain.      I - Ice - icing regularly can help to decrease inflammation and swelling in the foot, thus decreasing pain. Using an ice pack or a bag of frozen veggies works very well. Ice for 20 minutes multiple times per day as needed.  Do not place the ice directly on the skin as this can cause tissue damage.    C - Compression - using a compression wrap or an ACE wrap can help to decrease swelling, which can help to decrease pain. Wearing the wraps is generally not needed at night, but they should be worn on a regular basis when you are going to be on your feet for prolonged periods as gravity tends to pull fluids down to your feet/ankles.    E - Elevation - elevating your  lower extremities multiple times daily for 15-20 minutes can help to decrease swelling, which works well in decreasing pain levels.    NSAID/Tylenol - Anti-inflammatories like Aleve or ibuprofen, and/or a pain medication, such as Tylenol, can help to improve pain levels and get the issue resolved sooner rather than later. Anyone with liver issues should be careful with Tylenol, and anyone with high blood pressure or heart, stomach or kidney issues should be careful with anti-inflammatories. Please ask if you have questions about these medications, including dosage.    OVER THE COUNTER INSERTS    Most of these can be found at your local [employer], [employer], or online:    [employer]   Sofsole Fit [employer]   Power Step   Walk-Fit ([employer])  *For heel pain* Arch Cradles  *For heel pain*       ** A good high quality over the counter insert should cost around $40-$50    ** Capsulitis/Metarasalgia - try adding a metatarsal pad on your inserts or find an insert with one built in        [facility] LOCATIONS  [address]  [phone]   [address]  [phone] [place]  [address] [place] [address]  [phone]   [address]  [phone]   [place]  [address]  [phone] [place] [address]  [phone]   [place]  [address]  [phone]   [place]  [address]  [phone] [place]  [address]  [phone]

## 2021-05-10 NOTE — PROGRESS NOTES
"Foot & Ankle Surgery  May 10, 2021    CC: \" Foot pain\"    I was asked to see Yoselyn Mcgill regarding the chief complaint by:  Dr. PARUL Powell    HPI:  Pt is a 45 year old female who presents with above complaint.  Left foot pain \"on and off\" x6 months.  Looking for diagnosis and treatment plan.  Describes shooting pain.  Pain 8 out of 10 daily, worse with \"extra activity\".  She is tried \"ice/Arnica\" with some improvement.  \"I've been doing more on the palatine\" but no other inciting event or injury was noted.  She states this is unpredictable with significant pain.  No redness or swelling, no paresthesias, no lower back issues.  Feeling better over the last 4 days.    ROS:   Pos for CC.  The patient denies current nausea, vomiting, chills, fevers, belly pain, calf pain, chest pain or SOB.  Complete remainder of ROS is otherwise neg.    VITALS:    Vitals:    05/10/21 1021   BP: 110/78   Weight: 62.6 kg (138 lb)   Height: 1.6 m (5' 3\")       PMH:    Past Medical History:   Diagnosis Date     Anxiety state, unspecified     ECT [id number], multiple meds, off for 2 yrs.       HPV (human papilloma virus) infection        SXHX:    Past Surgical History:   Procedure Laterality Date     ANKLE SURGERY  08/10/11      SECTION N/A 2015    Procedure:  SECTION;  Surgeon: Emma Kendall MD;  Location: [facility] L[facility]     DILATION AND CURETTAGE, OPERATIVE HYSTEROSCOPY WITH MORCELLATOR, COMBINED N/A 2021    Procedure: HYSTEROSCOPY, WITH DILATION AND CURETTAGE OF UTERUS USING MORCELLATOR;  Surgeon: Marybel Hutchison MD;  Location: [facility] OR     ELECTROCONVULSIVE THERAPY  -    [facility]        MEDS:    Current Outpatient Medications   Medication     BALZIVA 0.4-35 MG-MCG tablet     buPROPion (WELLBUTRIN SR) 100 MG 12 hr tablet     ibuprofen (ADVIL/MOTRIN) 600 MG tablet     LORazepam (ATIVAN) 0.5 MG tablet     Omega-3 Fatty Acids (OMEGA-3 FISH OIL PO)     sertraline (ZOLOFT) 50 MG tablet     " traZODone (DESYREL) 50 MG tablet     No current facility-administered medications for this visit.        ALL:     Allergies   Allergen Reactions     Adhesive Tape      EKG patches cause a rash     Amoxicillin Rash       FMH:    Family History   Problem Relation Age of Onset     C.A.D. Mother      Hypertension Mother      Breast Cancer Mother 50     C.A.D. Maternal Grandfather      Hypertension Maternal Grandfather      Alcohol/Drug Maternal Grandfather      C.A.D. Paternal Grandfather      Depression Paternal Grandmother      Other - See Comments Father         hereditary hemochromotosis     Colon Cancer Maternal Aunt        SocHx:    Social History     Socioeconomic History     Marital status:      Spouse name: Not on file     Number of children: 0     Years of education: Not on file     Highest education level: Not on file   Occupational History     Employer: UNEMPLOYED   Social Needs     Financial resource strain: Not on file     Food insecurity     Worry: Not on file     Inability: Not on file     Transportation needs     Medical: Not on file     Non-medical: Not on file   Tobacco Use     Smoking status: Never Smoker     Smokeless tobacco: Never Used   Substance and Sexual Activity     Alcohol use: Yes     Alcohol/week: 0.0 standard drinks     Frequency: Monthly or less     Drinks per session: 1 or 2     Binge frequency: Never     Comment: socially     Drug use: No     Sexual activity: Yes     Partners: Male     Birth control/protection: Pill   Lifestyle     Physical activity     Days per week: Not on file     Minutes per session: Not on file     Stress: Not on file   Relationships     Social connections     Talks on phone: Not on file     Gets together: Not on file     Attends [employer] service: Not on file     Active member of club or organization: Not on file     Attends meetings of clubs or organizations: Not on file     Relationship status: Not on file     Intimate partner violence     Fear of  current or ex partner: Not on file     Emotionally abused: Not on file     Physically abused: Not on file     Forced sexual activity: Not on file   Other Topics Concern     Parent/sibling w/ CABG, MI or angioplasty before 65F 55M? Not Asked   Social History Narrative     Not on file           EXAMINATION:  Gen:   No apparent distress  Neuro:   A&Ox3, no deficits  Psych:    Answering questions appropriately for age and situation with normal affect  Head:    NCAT  Eye:    Visual scanning without deficit  Ear:    Response to auditory stimuli wnl  Lung:    Non-labored breathing on RA noted  Abd:    NTND per patient report  Lymph:    Neg for pitting/non-pitting edema BLE  Vasc:    Pulses palpable, CFT minimally delayed  Neuro:    Light touch sensation intact to all sensory nerve distributions without paresthesias  Derm:    Neg for nodules, lesions or ulcerations  MSK:    Left lower extremity -tender over the fifth metatarsal without localized inflammation or swelling.  No peroneal, inferior calcaneal nerve, intermetatarsal space, or plantar arch pain are noted  Calf:    Neg for redness, swelling or tenderness      Assessment:  45 year old female with left fifth metatarsal pain      Plan:  Discussed etiologies, anatomy and options  1.  Left fifth metatarsal pain  -I personally reviewed the patient's lower extremity history pertinent to today's visit, including imaging/labs, in preparation for initiating a treatment program.  -Advised wearing comfortable shoes and minimizing shoeless walking based on symptoms  -OTC insert for arch support and stress relief on metatarsal  -RICE/NSAID versus Tylenol as needed based on pain  -Consider tapered oral steroid dose and walking cast boot.  We discussed these today but she declined for now.  I advise she call or MyChart if she would like to proceed with these    Follow up:  prn or sooner with acute issues      Patient's medical history was reviewed today      Roger Moctezuma,  JAGUAR [facility] FACFAOM  Podiatric Foot & Ankle Surgeon  [facility]  [phone]    Disclaimer: This note consists of symbols derived from keyboarding, dictation and/or voice recognition software. As a result, there may be errors in the script that have gone undetected. Please consider this when interpreting information found in this chart.

## 2021-06-01 DIAGNOSIS — Z30.011 ORAL CONTRACEPTIVE PRESCRIBED: ICD-10-CM

## 2021-06-01 RX ORDER — NORETHINDRONE AND ETHINYL ESTRADIOL 0.4-0.035
1 KIT ORAL DAILY
Qty: 112 TABLET | Refills: 1 | Status: SHIPPED | OUTPATIENT
Start: 2021-06-01 | End: 2022-01-28

## 2021-06-01 NOTE — TELEPHONE ENCOUNTER
Pending Prescriptions:                       Disp   Refills    BALZIVA 0.4-35 MG-MCG tablet              112 ta*1            Sig: Take 1 tablet by mouth daily Active tablets only           for prevention of menses    Last OV was 1/8/21. Pt had surgery 2/12/21. Up to date. Refill sent to pharmacy.   Bethany Green, DONY-BSN

## 2021-06-05 ENCOUNTER — HEALTH MAINTENANCE LETTER (OUTPATIENT)
Age: 46
End: 2021-06-05

## 2021-07-14 DIAGNOSIS — F41.1 GENERALIZED ANXIETY DISORDER: ICD-10-CM

## 2021-07-14 RX ORDER — LORAZEPAM 0.5 MG/1
TABLET ORAL
Qty: 20 TABLET | Refills: 0 | Status: SHIPPED | OUTPATIENT
Start: 2021-07-14 | End: 2021-09-08

## 2021-07-14 NOTE — TELEPHONE ENCOUNTER
PN,    Controlled Substance Refill Request for Lorazepam    Last refill: 10/23/2020 - #20    Last clinic visit: 1/20/21 - Preop. Last physical 12/18/2019    Clinic visit frequency required: Not documented  Next appt: none    Controlled substance agreement on file: No.    Documentation in problem list reviewed:  No    Processing:  Rx to be electronically transmitted to pharmacy by provider     Thanks,  Elizabeth Elder RN

## 2021-07-29 ENCOUNTER — MYC MEDICAL ADVICE (OUTPATIENT)
Dept: FAMILY MEDICINE | Facility: CLINIC | Age: 46
End: 2021-07-29

## 2021-08-13 ENCOUNTER — HOSPITAL ENCOUNTER (OUTPATIENT)
Dept: MAMMOGRAPHY | Facility: CLINIC | Age: 46
Discharge: HOME | End: 2021-08-13
Attending: FAMILY MEDICINE | Admitting: FAMILY MEDICINE
Payer: COMMERCIAL

## 2021-08-13 DIAGNOSIS — Z12.31 VISIT FOR SCREENING MAMMOGRAM: ICD-10-CM

## 2021-08-13 PROCEDURE — 77063 BREAST TOMOSYNTHESIS BI: CPT

## 2021-08-21 DIAGNOSIS — F41.1 GAD (GENERALIZED ANXIETY DISORDER): ICD-10-CM

## 2021-08-21 DIAGNOSIS — G47.09 OTHER INSOMNIA: ICD-10-CM

## 2021-08-23 NOTE — TELEPHONE ENCOUNTER
Routing refill request to provider for review/approval because:  Drug interaction warning  Please authorize if appropriate.  Thanks,  Tori Edwards RN

## 2021-08-24 RX ORDER — TRAZODONE HYDROCHLORIDE 50 MG/1
TABLET, FILM COATED ORAL
Qty: 180 TABLET | Refills: 0 | Status: SHIPPED | OUTPATIENT
Start: 2021-08-24 | End: 2021-11-22

## 2021-08-25 DIAGNOSIS — F41.1 GENERALIZED ANXIETY DISORDER: ICD-10-CM

## 2021-08-25 RX ORDER — LORAZEPAM 0.5 MG/1
TABLET ORAL
Start: 2021-08-25

## 2021-09-03 NOTE — PROGRESS NOTES
Assessment & Plan     Generalized anxiety disorder  Using the lorazepam infrequently -   20 tablets lasted almost one year.  Will refill today   She never got the Rx that was written for July ?  Pharmacy issue but nothing found on   - LORazepam (ATIVAN) 0.5 MG tablet; TAKE 1 TABLET(0.5 MG) BY MOUTH EVERY 8 HOURS AS NEEDED FOR ANXIETY    Lateral epicondylitis of right elbow  Lateral symptoms  Discussed conservative therapy  If persists or worsens she will send message for referral for injecion     Localized swelling, mass and lump, neck  Lump on left posterior neck -   Persisting and not resolving   Will check ultrasound to look if cystic or lymph node or other  - US Head Neck Soft Tissue; Future      No follow-ups on file.    Elisabeth Powell, [facility]    Darlin Topete is a 46 year old who presents for the following health issues     HPI     Answers for HPI/ROS submitted by the patient on 9/8/2021  If you checked off any problems, how difficult have these problems made it for you to do your work, take care of things at home, or get along with other people?: Not difficult at all  PHQ9 TOTAL SCORE: 1      Anxiety Follow-Up    How are you doing with your anxiety since your last visit? No change    Are you having other symptoms that might be associated with anxiety? No    Have you had a significant life event? No     Are you feeling depressed? No    Do you have any concerns with your use of alcohol or other drugs? No     Pain in the right arm   Using arnica, brace  First noticed in Spring time and has persisted  aggravated by activity - house and computer work      Social History     Tobacco Use     Smoking status: Never Smoker     Smokeless tobacco: Never Used   Substance Use Topics     Alcohol use: Yes     Alcohol/week: 0.0 standard drinks     Comment: socially     Drug use: No     YANIRA-7 SCORE 1/25/2019 2/22/2019 5/1/2019   Total Score 8 3 1     PHQ 10/27/2020 12/9/2020 9/8/2021    PHQ-9 Total Score 3 4 1   Q9: Thoughts of better off dead/self-harm past 2 weeks Not at all Not at all Not at all     Last PHQ-9 9/8/2021   1.  Little interest or pleasure in doing things 0   2.  Feeling down, depressed, or hopeless 0   3.  Trouble falling or staying asleep, or sleeping too much 1   4.  Feeling tired or having little energy 0   5.  Poor appetite or overeating 0   6.  Feeling bad about yourself 0   7.  Trouble concentrating 0   8.  Moving slowly or restless 0   Q9: Thoughts of better off dead/self-harm past 2 weeks 0   PHQ-9 Total Score 1   Difficulty at work, home, or with people -     YANIRA-7  5/1/2019   1. Feeling nervous, anxious, or on edge 1   2. Not being able to stop or control worrying 0   3. Worrying too much about different things 0   4. Trouble relaxing 0   5. Being so restless that it is hard to sit still 0   6. Becoming easily annoyed or irritable 0   7. Feeling afraid, as if something awful might happen 0   YANIRA-7 Total Score 1   If you checked any problems, how difficult have they made it for you to do your work, take care of things at home, or get along with other people? Not difficult at all         How many servings of fruits and vegetables do you eat daily?  4-5    On average, how many sweetened beverages do you drink each day (Examples: soda, juice, sweet tea, etc.  Do NOT count diet or artificially sweetened beverages)?   0    How many days per week do you exercise enough to make your heart beat faster? 5-6    How many minutes a day do you exercise enough to make your heart beat faster? 30 - 60    How many days per week do you miss taking your medication? 0    Musculoskeletal problem/pain  Onset/Duration: 05/2020  Description  Location: elbow - right  Joint Swelling: no  Redness: no  Pain: YES  Warmth: no  Intensity:  mild, moderate  Progression of Symptoms:  Intermittent notice it more when on the computer   Accompanying signs and symptoms:   Fevers:  "no  Numbness/tingling/weakness: YES  History  Trauma to the area: no  Recent illness:  no  Previous similar problem: no  Previous evaluation:  no  Precipitating or alleviating factors:  Aggravating factors include: movement , typing   Therapies tried and outcome: support wrap and arnica         Review of Systems   Constitutional, HEENT, cardiovascular, pulmonary, gi and gu systems are negative, except as otherwise noted.      Objective    /72   Pulse 83   Temp 98.6  F (37  C) (Temporal)   Resp 16   Ht 1.58 m (5' 2.21\")   Wt 60.3 kg (132 lb 14.4 oz)   SpO2 95%   BMI 24.15 kg/m    Body mass index is 24.15 kg/m .  Physical Exam   GENERAL: healthy, alert and no distress  HENT: ear canals and TM's normal, nose and mouth without ulcers or lesions  NECK: no asymmetry, masses, or scars, thyroid normal to palpation and left posterior cervical lump appreciated - approx 1cm lesion                 "

## 2021-09-08 ENCOUNTER — OFFICE VISIT (OUTPATIENT)
Dept: FAMILY MEDICINE | Facility: CLINIC | Age: 46
End: 2021-09-08
Payer: COMMERCIAL

## 2021-09-08 VITALS
WEIGHT: 132.9 LBS | TEMPERATURE: 98.6 F | OXYGEN SATURATION: 95 % | BODY MASS INDEX: 24.46 KG/M2 | SYSTOLIC BLOOD PRESSURE: 120 MMHG | DIASTOLIC BLOOD PRESSURE: 72 MMHG | RESPIRATION RATE: 16 BRPM | HEIGHT: 62 IN | HEART RATE: 83 BPM

## 2021-09-08 DIAGNOSIS — F41.1 GENERALIZED ANXIETY DISORDER: ICD-10-CM

## 2021-09-08 DIAGNOSIS — R22.1 LOCALIZED SWELLING, MASS AND LUMP, NECK: ICD-10-CM

## 2021-09-08 DIAGNOSIS — M77.11 LATERAL EPICONDYLITIS OF RIGHT ELBOW: Primary | ICD-10-CM

## 2021-09-08 PROCEDURE — 99214 OFFICE O/P EST MOD 30 MIN: CPT | Performed by: FAMILY MEDICINE

## 2021-09-08 RX ORDER — LORAZEPAM 0.5 MG/1
TABLET ORAL
Qty: 20 TABLET | Refills: 0 | Status: SHIPPED | OUTPATIENT
Start: 2021-09-08 | End: 2022-01-28

## 2021-09-08 ASSESSMENT — PATIENT HEALTH QUESTIONNAIRE - PHQ9
10. IF YOU CHECKED OFF ANY PROBLEMS, HOW DIFFICULT HAVE THESE PROBLEMS MADE IT FOR YOU TO DO YOUR WORK, TAKE CARE OF THINGS AT HOME, OR GET ALONG WITH OTHER PEOPLE: NOT DIFFICULT AT ALL
SUM OF ALL RESPONSES TO PHQ QUESTIONS 1-9: 1
SUM OF ALL RESPONSES TO PHQ QUESTIONS 1-9: 1

## 2021-09-08 ASSESSMENT — MIFFLIN-ST. JEOR: SCORE: 1199.33

## 2021-09-17 ENCOUNTER — HOSPITAL ENCOUNTER (OUTPATIENT)
Dept: ULTRASOUND IMAGING | Facility: CLINIC | Age: 46
Discharge: HOME | End: 2021-09-17
Attending: FAMILY MEDICINE | Admitting: FAMILY MEDICINE
Payer: COMMERCIAL

## 2021-09-17 DIAGNOSIS — R22.1 LOCALIZED SWELLING, MASS AND LUMP, NECK: ICD-10-CM

## 2021-09-17 PROCEDURE — 76536 US EXAM OF HEAD AND NECK: CPT

## 2021-09-17 NOTE — RESULT ENCOUNTER NOTE
"Dear Yoselyn,   Your test results are all back -   Good news the lump is just a \"normal\" lymph node.  No concerning findings.  Let us know if you have any questions.  -Elisabeth Powell, DO  "

## 2021-09-25 ENCOUNTER — HEALTH MAINTENANCE LETTER (OUTPATIENT)
Age: 46
End: 2021-09-25

## 2021-11-19 DIAGNOSIS — F41.1 GAD (GENERALIZED ANXIETY DISORDER): ICD-10-CM

## 2021-11-19 DIAGNOSIS — G47.09 OTHER INSOMNIA: ICD-10-CM

## 2021-11-22 RX ORDER — TRAZODONE HYDROCHLORIDE 50 MG/1
TABLET, FILM COATED ORAL
Qty: 180 TABLET | Refills: 0 | Status: SHIPPED | OUTPATIENT
Start: 2021-11-22 | End: 2022-10-21

## 2021-11-22 NOTE — TELEPHONE ENCOUNTER
FS,  Please authorize if appropriate in PN's absence if appropriate.  Routing refill request to provider for review/approval because:  Drug interaction warning  ThanksTori RN

## 2022-01-02 ENCOUNTER — MYC MEDICAL ADVICE (OUTPATIENT)
Dept: FAMILY MEDICINE | Facility: CLINIC | Age: 47
End: 2022-01-02
Payer: COMMERCIAL

## 2022-01-02 DIAGNOSIS — F41.1 GAD (GENERALIZED ANXIETY DISORDER): ICD-10-CM

## 2022-01-03 NOTE — TELEPHONE ENCOUNTER
Dr. Powell,    Please see mychart.     Thanks,  DONY Elizondo  [facility]

## 2022-01-09 ENCOUNTER — E-VISIT (OUTPATIENT)
Dept: FAMILY MEDICINE | Facility: CLINIC | Age: 47
End: 2022-01-09
Payer: COMMERCIAL

## 2022-01-09 DIAGNOSIS — B00.1 HERPES LABIALIS: Primary | ICD-10-CM

## 2022-01-09 PROCEDURE — 99421 OL DIG E/M SVC 5-10 MIN: CPT | Performed by: PHYSICIAN ASSISTANT

## 2022-01-09 RX ORDER — VALACYCLOVIR HYDROCHLORIDE 1 G/1
2000 TABLET, FILM COATED ORAL 2 TIMES DAILY
Qty: 4 TABLET | Refills: 1 | Status: SHIPPED | OUTPATIENT
Start: 2022-01-09 | End: 2022-12-07

## 2022-01-27 NOTE — PROGRESS NOTES
Assessment & Plan     YANRIA (generalized anxiety disorder)  Worsening anxiety and depression with multiple home, work and life stressors  She did icnrease dose of sertraline from 25mg up to 50mg 3 weeks ago  Will increase to 75mg at this time  Advised to use the lorazepam prn sleep as needed - she will use sparingly  F/u 4-6 weeks if not improving   - sertraline (ZOLOFT) 50 MG tablet; Take 1.5 tablets (75 mg) by mouth daily    Generalized anxiety disorder     - LORazepam (ATIVAN) 0.5 MG tablet; TAKE 1 TABLET(0.5 MG) BY MOUTH EVERY 8 HOURS AS NEEDED FOR ANXIETY    Oral contraceptive prescribed     - BALZIVA 0.4-35 MG-MCG tablet; Take 1 tablet by mouth daily Active tablets only for prevention of menses    Dermatitis   rash on neck - will try steroid BID for a few days   - triamcinolone (ARISTOCORT HP) 0.5 % external cream; Apply topically 2 times daily                 No follow-ups on file.    Elisabeth Powell [facility] is a 46 year old who presents for the following health issues     History of Present Illness       Mental Health Follow-up:  Patient presents to follow-up on Anxiety.    Patient's anxiety since last visit has been:  Worse  The patient is having other symptoms associated with anxiety.  Any significant life events: job concerns, grief or loss and health concerns  Patient is not feeling anxious or having panic attacks.  Patient has no concerns about alcohol or drug use.     Social History  Tobacco Use    Smoking status: Never Smoker    Smokeless tobacco: Never Used  Alcohol use: Yes    Alcohol/week: 0.0 standard drinks    Comment: socially  Drug use: No      Today's PHQ-9         PHQ-9 Total Score:     (P) 4   PHQ-9 Q9 Thoughts of better off dead/self-harm past 2 weeks :   (P) Not at all   Thoughts of suicide or self harm:      Self-harm Plan:        Self-harm Action:          Safety concerns for self or others:            Answers for HPI/ROS submitted by  the patient on 1/28/2022  If you checked off any problems, how difficult have these problems made it for you to do your work, take care of things at home, or get along with other people?: Somewhat difficult  PHQ9 TOTAL SCORE: 4  YANIRA 7 TOTAL SCORE: 18    Pt  having trouble sleeping, Pt just recently lost her only cousin 2 weeks ago       Social History     Tobacco Use     Smoking status: Never Smoker     Smokeless tobacco: Never Used   Substance Use Topics     Alcohol use: Yes     Alcohol/week: 0.0 standard drinks     Comment: socially     Drug use: No     YANIRA-7 SCORE 2/22/2019 5/1/2019 1/28/2022   Total Score - - 18 (severe anxiety)   Total Score 3 1 18     PHQ 12/9/2020 9/8/2021 1/28/2022   PHQ-9 Total Score 4 1 4   Q9: Thoughts of better off dead/self-harm past 2 weeks Not at all Not at all Not at all     Last PHQ-9 1/28/2022   1.  Little interest or pleasure in doing things 0   2.  Feeling down, depressed, or hopeless 0   3.  Trouble falling or staying asleep, or sleeping too much 2   4.  Feeling tired or having little energy 0   5.  Poor appetite or overeating 0   6.  Feeling bad about yourself 1   7.  Trouble concentrating 1   8.  Moving slowly or restless 0   Q9: Thoughts of better off dead/self-harm past 2 weeks 0   PHQ-9 Total Score 4   Difficulty at work, home, or with people -     YANIRA-7  1/28/2022   1. Feeling nervous, anxious, or on edge 3   2. Not being able to stop or control worrying 2   3. Worrying too much about different things 2   4. Trouble relaxing 3   5. Being so restless that it is hard to sit still 3   6. Becoming easily annoyed or irritable 3   7. Feeling afraid, as if something awful might happen 2   YANIRA-7 Total Score 18   If you checked any problems, how difficult have they made it for you to do your work, take care of things at home, or get along with other people? -     Past 6 weeks - worsening anxiety  Just recently - Jan 4th - increased sertraline from 25mg up to 50mg        Review of  "Systems   Constitutional, HEENT, cardiovascular, pulmonary, gi and gu systems are negative, except as otherwise noted.      Objective    BP (!) 145/90   Pulse 73   Temp 97.8  F (36.6  C)   Ht 1.58 m (5' 2.2\")   Wt 61.1 kg (134 lb 9.6 oz)   SpO2 97%   BMI 24.46 kg/m    Body mass index is 24.46 kg/m .  Physical Exam   GENERAL: healthy, alert and no distress  CV: regular rate and rhythm, normal S1 S2, no S3 or S4, no murmur, click or rub, no peripheral edema and peripheral pulses strong  SKIN: erythematous papules over the right posterior neck  PSYCH: mentation appears normal and tearful                "

## 2022-01-28 ENCOUNTER — OFFICE VISIT (OUTPATIENT)
Dept: FAMILY MEDICINE | Facility: CLINIC | Age: 47
End: 2022-01-28
Payer: COMMERCIAL

## 2022-01-28 VITALS
OXYGEN SATURATION: 97 % | DIASTOLIC BLOOD PRESSURE: 90 MMHG | HEART RATE: 73 BPM | BODY MASS INDEX: 24.77 KG/M2 | HEIGHT: 62 IN | WEIGHT: 134.6 LBS | TEMPERATURE: 97.8 F | SYSTOLIC BLOOD PRESSURE: 145 MMHG

## 2022-01-28 DIAGNOSIS — Z30.011 ORAL CONTRACEPTIVE PRESCRIBED: ICD-10-CM

## 2022-01-28 DIAGNOSIS — L30.9 DERMATITIS: Primary | ICD-10-CM

## 2022-01-28 DIAGNOSIS — F41.1 GAD (GENERALIZED ANXIETY DISORDER): ICD-10-CM

## 2022-01-28 DIAGNOSIS — F41.1 GENERALIZED ANXIETY DISORDER: ICD-10-CM

## 2022-01-28 PROCEDURE — 99214 OFFICE O/P EST MOD 30 MIN: CPT | Performed by: FAMILY MEDICINE

## 2022-01-28 RX ORDER — NORETHINDRONE AND ETHINYL ESTRADIOL 0.4-0.035
1 KIT ORAL DAILY
Qty: 112 TABLET | Refills: 1 | Status: SHIPPED | OUTPATIENT
Start: 2022-01-28 | End: 2022-01-31

## 2022-01-28 RX ORDER — TRIAMCINOLONE ACETONIDE 5 MG/G
CREAM TOPICAL 2 TIMES DAILY
Qty: 15 G | Refills: 0 | Status: SHIPPED | OUTPATIENT
Start: 2022-01-28

## 2022-01-28 RX ORDER — LORAZEPAM 0.5 MG/1
TABLET ORAL
Qty: 20 TABLET | Refills: 1 | Status: SHIPPED | OUTPATIENT
Start: 2022-01-28 | End: 2022-11-15

## 2022-01-28 ASSESSMENT — PATIENT HEALTH QUESTIONNAIRE - PHQ9
SUM OF ALL RESPONSES TO PHQ QUESTIONS 1-9: 4
SUM OF ALL RESPONSES TO PHQ QUESTIONS 1-9: 4
10. IF YOU CHECKED OFF ANY PROBLEMS, HOW DIFFICULT HAVE THESE PROBLEMS MADE IT FOR YOU TO DO YOUR WORK, TAKE CARE OF THINGS AT HOME, OR GET ALONG WITH OTHER PEOPLE: SOMEWHAT DIFFICULT

## 2022-01-28 ASSESSMENT — ANXIETY QUESTIONNAIRES
1. FEELING NERVOUS, ANXIOUS, OR ON EDGE: NEARLY EVERY DAY
2. NOT BEING ABLE TO STOP OR CONTROL WORRYING: MORE THAN HALF THE DAYS
4. TROUBLE RELAXING: NEARLY EVERY DAY
6. BECOMING EASILY ANNOYED OR IRRITABLE: NEARLY EVERY DAY
GAD7 TOTAL SCORE: 18
GAD7 TOTAL SCORE: 18
3. WORRYING TOO MUCH ABOUT DIFFERENT THINGS: MORE THAN HALF THE DAYS
GAD7 TOTAL SCORE: 18
7. FEELING AFRAID AS IF SOMETHING AWFUL MIGHT HAPPEN: MORE THAN HALF THE DAYS
7. FEELING AFRAID AS IF SOMETHING AWFUL MIGHT HAPPEN: MORE THAN HALF THE DAYS
5. BEING SO RESTLESS THAT IT IS HARD TO SIT STILL: NEARLY EVERY DAY

## 2022-01-28 ASSESSMENT — MIFFLIN-ST. JEOR: SCORE: 1206.97

## 2022-01-29 ASSESSMENT — ANXIETY QUESTIONNAIRES: GAD7 TOTAL SCORE: 18

## 2022-01-29 ASSESSMENT — PATIENT HEALTH QUESTIONNAIRE - PHQ9: SUM OF ALL RESPONSES TO PHQ QUESTIONS 1-9: 4

## 2022-01-31 ENCOUNTER — MYC MEDICAL ADVICE (OUTPATIENT)
Dept: OBGYN | Facility: CLINIC | Age: 47
End: 2022-01-31
Payer: COMMERCIAL

## 2022-01-31 DIAGNOSIS — Z30.011 ORAL CONTRACEPTIVE PRESCRIBED: ICD-10-CM

## 2022-01-31 ASSESSMENT — ANXIETY QUESTIONNAIRES
3. WORRYING TOO MUCH ABOUT DIFFERENT THINGS: MORE THAN HALF THE DAYS
IF YOU CHECKED OFF ANY PROBLEMS ON THIS QUESTIONNAIRE, HOW DIFFICULT HAVE THESE PROBLEMS MADE IT FOR YOU TO DO YOUR WORK, TAKE CARE OF THINGS AT HOME, OR GET ALONG WITH OTHER PEOPLE: VERY DIFFICULT
GAD7 TOTAL SCORE: 18
1. FEELING NERVOUS, ANXIOUS, OR ON EDGE: NEARLY EVERY DAY
2. NOT BEING ABLE TO STOP OR CONTROL WORRYING: MORE THAN HALF THE DAYS
7. FEELING AFRAID AS IF SOMETHING AWFUL MIGHT HAPPEN: MORE THAN HALF THE DAYS
6. BECOMING EASILY ANNOYED OR IRRITABLE: NEARLY EVERY DAY
5. BEING SO RESTLESS THAT IT IS HARD TO SIT STILL: NEARLY EVERY DAY

## 2022-01-31 ASSESSMENT — PATIENT HEALTH QUESTIONNAIRE - PHQ9: 5. POOR APPETITE OR OVEREATING: NEARLY EVERY DAY

## 2022-01-31 NOTE — TELEPHONE ENCOUNTER
Pharmacy sent refill request for OCP.  Last annual exam [id number] with Dr. Hutchison.  Pt and pharmacy notified.  Once pt has scheduled, short refill can be sent.  Jeaneth Johnson RN

## 2022-02-01 RX ORDER — NORETHINDRONE AND ETHINYL ESTRADIOL 0.4-0.035
1 KIT ORAL DAILY
Qty: 112 TABLET | Refills: 0 | Status: SHIPPED | OUTPATIENT
Start: 2022-02-01 | End: 2022-03-30

## 2022-02-01 RX ORDER — NORETHINDRONE AND ETHINYL ESTRADIOL 0.4-0.035
1 KIT ORAL DAILY
Qty: 28 TABLET | Refills: 0 | Status: SHIPPED | OUTPATIENT
Start: 2022-02-01 | End: 2022-02-01

## 2022-03-12 ENCOUNTER — HEALTH MAINTENANCE LETTER (OUTPATIENT)
Age: 47
End: 2022-03-12

## 2022-03-17 ENCOUNTER — MYC MEDICAL ADVICE (OUTPATIENT)
Dept: FAMILY MEDICINE | Facility: CLINIC | Age: 47
End: 2022-03-17
Payer: COMMERCIAL

## 2022-03-17 DIAGNOSIS — L30.9 DERMATITIS: ICD-10-CM

## 2022-03-18 RX ORDER — FLUOCINONIDE 0.5 MG/G
CREAM TOPICAL 2 TIMES DAILY
Qty: 60 G | Refills: 1 | Status: SHIPPED | OUTPATIENT
Start: 2022-03-18

## 2022-03-18 NOTE — TELEPHONE ENCOUNTER
PN,  Please see below and advise.,  Can rec evisit if you prefer.  Please advise.  Thanks,  Tori Edwards RN

## 2022-03-30 ENCOUNTER — OFFICE VISIT (OUTPATIENT)
Dept: OBGYN | Facility: CLINIC | Age: 47
End: 2022-03-30
Payer: COMMERCIAL

## 2022-03-30 VITALS
OXYGEN SATURATION: 97 % | SYSTOLIC BLOOD PRESSURE: 135 MMHG | HEART RATE: 87 BPM | WEIGHT: 135 LBS | HEIGHT: 62 IN | BODY MASS INDEX: 24.84 KG/M2 | DIASTOLIC BLOOD PRESSURE: 80 MMHG

## 2022-03-30 DIAGNOSIS — Z12.11 SPECIAL SCREENING FOR MALIGNANT NEOPLASMS, COLON: ICD-10-CM

## 2022-03-30 DIAGNOSIS — Z01.419 ENCOUNTER FOR GYNECOLOGICAL EXAMINATION WITHOUT ABNORMAL FINDING: Primary | ICD-10-CM

## 2022-03-30 PROCEDURE — 99396 PREV VISIT EST AGE 40-64: CPT | Performed by: OBSTETRICS & GYNECOLOGY

## 2022-03-30 RX ORDER — NORETHINDRONE AND ETHINYL ESTRADIOL 0.4-0.035
1 KIT ORAL DAILY
Qty: 112 TABLET | Refills: 4 | Status: SHIPPED | OUTPATIENT
Start: 2022-03-30 | End: 2023-04-13

## 2022-03-30 NOTE — PROGRESS NOTES
Yoselyn is a 46 year old  female who presents for annual exam.     Menses are rare  No LMP recorded. (Menstrual status: Birth Control).. Using oral contraceptives for contraception.  She is not currently considering pregnancy.  Besides routine health maintenance, she has no other health concerns today .  Daughters 17 and almost 8 y/o.  Older one (step daughter) outpatient DBT program, using drugs and not doing well. She and spouse are taking the younger daughter to [place] for spring break.   We did polypectomy last year and since then cycles are very rare and doing great on OCP, needs refill.    GYNECOLOGIC HISTORY:  Menarche:   Yoselyn is sexually active with 1male partner(s) and is currently in monogamous relationship.    History sexually transmitted infections:No STD history  STI testing offered?  Declined  LO exposure: Unknown  History of abnormal Pap smear: NO - age 30-65 PAP every 5 years with negative HPV co-testing recommended  Family history of breast CA: Yes (Please explain): MOM  Family history of uterine/ovarian CA: No    Family history of colon CA: Yes (Please explain): M-AUNT    HEALTH MAINTENANCE:  Cholesterol: (  Cholesterol   Date Value Ref Range Status   2019 240 (H) <200 mg/dL Final     Comment:     Desirable:       <200 mg/dl   2019 215 (H) <200 mg/dL Final     Comment:     Desirable:       <200 mg/dl    History of abnormal lipids: Yes  Mammo: 21 . History of abnormal Mammo: No.  Regular Self Breast Exams: No  Calcium/Vitamin D intake: source:  dairy, vit d Adequate? Yes  TSH: (  TSH   Date Value Ref Range Status   2019 0.76 0.40 - 4.00 mU/L Final    )  Pap; (  Lab Results   Component Value Date    PAP NIL 2018    PAP NIL 2015    PAP NIL 2014    )    HISTORY:  OB History    Para Term  AB Living   1 1 1 0 0 1   SAB IAB Ectopic Multiple Live Births   0 0 0 0 1      # Outcome Date GA Lbr Antonio/2nd Weight Sex Delivery Anes PTL Lv   1 Term  05/29/15 41w1d 11:59 / 06:16 3.09 kg (6 lb 13 oz) F CS-LTranv Spinal, INT  KARINA      Name: Katalina      Apgar1: 9  Apgar5: 9     Past Medical History:   Diagnosis Date     Anxiety state, unspecified     ECT [id number], multiple meds, off for 2 yrs.       HPV (human papilloma virus) infection      Past Surgical History:   Procedure Laterality Date     ANKLE SURGERY  08/10/11      SECTION N/A 2015    Procedure:  SECTION;  Surgeon: Emma Kendall MD;  [facility]     DILATION AND CURETTAGE, OPERATIVE HYSTEROSCOPY WITH MORCELLATOR, COMBINED N/A 2021    Procedure: HYSTEROSCOPY, WITH DILATION AND CURETTAGE OF UTERUS USING MORCELLATOR;  Surgeon: Marybel Hutchison MD;  [facility] OR     ELECTROCONVULSIVE THERAPY  -    [facility] Dr Carpenter     Family History   Problem Relation Age of Onset     C.A.D. Mother      Hypertension Mother      Breast Cancer Mother 50     C.A.D. Maternal Grandfather      Hypertension Maternal Grandfather      Alcohol/Drug Maternal Grandfather      C.A.D. Paternal Grandfather      Depression Paternal Grandmother      Other - See Comments Father         hereditary hemochromotosis     Colon Cancer Maternal Aunt      Social History     Socioeconomic History     Marital status:      Spouse name: Not on file     Number of children: 0     Years of education: Not on file     Highest education level: Not on file   Occupational History     Employer: UNEMPLOYED   Tobacco Use     Smoking status: Never Smoker     Smokeless tobacco: Never Used   Substance and Sexual Activity     Alcohol use: Yes     Alcohol/week: 0.0 standard drinks     Comment: socially     Drug use: No     Sexual activity: Yes     Partners: Male     Birth control/protection: Pill   Other Topics Concern     Parent/sibling w/ CABG, MI or angioplasty before 65F 55M? Not Asked   Social History Narrative     Not on file     Social Determinants of Health     Financial Resource Strain: Not on  "file   Food Insecurity: Not on file   Transportation Needs: Not on file   Physical Activity: Not on file   Stress: Not on file   Social Connections: Not on file   Intimate Partner Violence: Not on file   Housing Stability: Not on file       Current Outpatient Medications:      BALZIVA 0.4-35 MG-MCG tablet, Take 1 tablet by mouth daily Active tablets only for prevention of menses, Disp: 112 tablet, Rfl: 0     buPROPion (WELLBUTRIN SR) 100 MG 12 hr tablet, TAKE 1 TABLET(100 MG) BY MOUTH DAILY, Disp: 30 tablet, Rfl: 0     fluocinonide (LIDEX) 0.05 % external cream, Apply topically 2 times daily, Disp: 60 g, Rfl: 1     ibuprofen (ADVIL/MOTRIN) 600 MG tablet, Take 1 tablet (600 mg) by mouth every 6 hours as needed for other (mild and/or inflammatory pain), Disp: 30 tablet, Rfl: 0     LORazepam (ATIVAN) 0.5 MG tablet, TAKE 1 TABLET(0.5 MG) BY MOUTH EVERY 8 HOURS AS NEEDED FOR ANXIETY, Disp: 20 tablet, Rfl: 1     Omega-3 Fatty Acids (OMEGA-3 FISH OIL PO), Take 1 g by mouth daily , Disp: , Rfl:      sertraline (ZOLOFT) 50 MG tablet, Take 1.5 tablets (75 mg) by mouth daily, Disp: 135 tablet, Rfl: 0     traZODone (DESYREL) 50 MG tablet, TAKE 1 TO 2 TABLETS BY MOUTH AT BEDTIME, Disp: 180 tablet, Rfl: 0     triamcinolone (ARISTOCORT HP) 0.5 % external cream, Apply topically 2 times daily, Disp: 15 g, Rfl: 0     valACYclovir (VALTREX) 1000 mg tablet, Take 2 tablets (2,000 mg) by mouth 2 times daily for 1 day, Disp: 4 tablet, Rfl: 1     Allergies   Allergen Reactions     Adhesive Tape      EKG patches cause a rash     Amoxicillin Rash       Past medical, surgical, social and family history were reviewed and updated in EPIC    EXAM:  /80   Pulse 87   Ht 1.58 m (5' 2.2\")   Wt 61.2 kg (135 lb)   SpO2 97%   BMI 24.53 kg/m     BMI: Body mass index is 24.53 kg/m .  Constitutional: healthy, alert and no distress  Head: Normocephalic. No masses, lesions, tenderness or abnormalities  Neck: Neck supple. Trachea midline. No " adenopathy. Thyroid symmetric, normal size.   Cardiovascular: RRR.   Respiratory: Negative.   Breast: Breasts reveal mild symmetric fibrocystic densities, but there are no dominant, discrete, fixed or suspicious masses found.  Gastrointestinal: Abdomen soft, non-tender, non-distended. No masses, organomegaly.  :  Vulva:  No external lesions, normal female hair distribution, no inguinal adenopathy.    Urethra:  Midline, non-tender, well supported, no discharge  Vagina:  Moist, pink, no abnormal discharge, no lesions  Uterus:  Normal size , non-tender, freely mobile  Ovaries:  No masses appreciated, non-tender, mobile  Rectal Exam: deferred  Musculoskeletal: extremities normal  Skin: no suspicious lesions or rashes  Psychiatric: Affect appropriate, cooperative,mentation appears normal.     COUNSELING:   Reviewed preventive health counseling, as reflected in patient instructions   reports that she has never smoked. She has never used smokeless tobacco.    Body mass index is 24.53 kg/m .    FRAX Risk Assessment    ASSESSMENT:  46 year old female with satisfactory annual exam  (Z01.419) Encounter for gynecological examination without abnormal finding  (primary encounter diagnosis)  Comment: OCP  Plan: BALZIVA 0.4-35 MG-MCG tablet        Refill was done. She will bring in advanced care directives and knows pap is due next year    (Z12.11) Special screening for malignant neoplasms, colon  Plan: Adult Gastro Ref - Procedure Only        Refer for colonoscopy.    Marybel Hutchison MD

## 2022-04-08 ENCOUNTER — HOSPITAL ENCOUNTER (OUTPATIENT)
Facility: AMBULATORY SURGERY CENTER | Age: 47
End: 2022-04-08
Attending: INTERNAL MEDICINE
Payer: COMMERCIAL

## 2022-04-08 ENCOUNTER — TELEPHONE (OUTPATIENT)
Dept: GASTROENTEROLOGY | Facility: CLINIC | Age: 47
End: 2022-04-08

## 2022-04-08 DIAGNOSIS — Z11.59 ENCOUNTER FOR SCREENING FOR OTHER VIRAL DISEASES: Primary | ICD-10-CM

## 2022-04-08 NOTE — TELEPHONE ENCOUNTER
Screening Questions  BlueKIND OF PREP RedLOCATION [review exclusion criteria] GreenSEDATION TYPE  1. Have you had a positive covid test in the last 90 days? N     2. Do you have a legal guardian or medical Power of ?  Are you able to give consent for your medical care?Yes (Sedation review/consideration needed)    3. Are you active on mychart? Y    4. What insurance is in the chart? HP    3.   Ordering/Referring Provider: Foster    4. BMI 23.0 [BMI OVER 40-EXTENDED PREP]  If greater than 40 review exclusion criteria [PAC APPT IF @ [facility]]        5.  Respiratory Screening :  [If yes to any of the following HOSPITAL setting only]     Do you use daily home oxygen? N    Do you have mod to severe Obstructive Sleep Apnea? N  [OKAY @ [facility] SH PH RI]   Do you have Pulmonary Hypertension? N     Do you have UNCONTROLLED asthma? N        6.   Have you had a heart or lung transplant? N      7.   Are you currently on dialysis? N [ If yes, G-PREP & HOSPITAL setting only]     8.   Do you have chronic kidney disease? N [ If yes, G-PREP ]    9.   Have you had a stroke or Transient ischemic attack (TIA - aka  mini stroke ) within 6 months?  N (If yes, please review exclusion criteria)    10.   In the past 6 months, have you had any heart related issues including cardiomyopathy or heart attack? N           If yes, did it require cardiac stenting or other implantable device? N      11.   Do you have any implantable devices in your body (pacemaker, defib, LVAD)? N (If yes, please review exclusion criteria)    12.   Do you take nitroglycerin? N           If yes, how often? NA  (if yes, HOSPITAL setting ONLY)    13.   Are you currently taking any blood thinners? N           [IF YES, INFORM PATIENT TO FOLLOW UP W/ ORDERING PROVIDER FOR BRIDGING INSTRUCTIONS]     14.   Do you have a diagnosis of diabetes? N   [ If yes, G-PREP ]    15.   [FEMALES] Are you currently pregnant? NA   If yes, how many weeks? NA    16.   Are you  taking any prescription pain medications on a routine schedule?  N  [ If yes, EXTENDED PREP.] [If yes, MAC]    17.   Do you have any chemical dependencies such as alcohol, street drugs, or methadone?  N [If yes, MAC]    18.   Do you have any history of post-traumatic stress syndrome, severe anxiety or history of psychosis?  Yes  [If yes, MAC]    19.   Do you transfer independently?  Yes    20.  On a regular basis do you go 3-5 days between bowel movements? N   [ If yes, EXTENDED PREP.]    21.   Preferred LOCAL Pharmacy for Pre Prescription      [id number] [address]        Scheduling Details      Caller : Yoselyn  (Please ask for phone number if not scheduled by patient)    Type of Procedure Scheduled: Colon  Which Colonoscopy Prep was Sent?: Miralax  KHORUTS CF PATIENTS & GROEN'S PATIENTS NEEDS EXTENDED PREP  Surgeon: Whitney  Date of Procedure: 5/12/22  Location: [facility] [place]      Sedation Type: MAC  Conscious Sedation- Needs  for 6 hours after the procedure  MAC/General-Needs  for 24 hours after procedure    Pre-op Required at [facility] [place], [place] and OR for MAC sedation: NA  (advise patient they will need a pre-op prior to procedure -)      Informed patient they will need an adult  Yes  Cannot take any type of public or medical transportation alone    [facility]    Confirmed Nurse will call to complete assessment Yes    Additional comments:

## 2022-04-28 ENCOUNTER — TELEPHONE (OUTPATIENT)
Dept: GASTROENTEROLOGY | Facility: CLINIC | Age: 47
End: 2022-04-28
Payer: COMMERCIAL

## 2022-04-28 NOTE — TELEPHONE ENCOUNTER
Caller: Yoselyn Mcgill    Procedure: Colon    Date, Location, and Surgeon of Procedure Cancelled: 5/12, Whitney STEIN    Ordering Provider:Marybel Hutchison MD      Reason for cancel (please be detailed, any staff messages or encounters to note?): Work Conflict        Rescheduled: Y     If rescheduled:    Date: 6/3   [place] [facility] [place]   Prep Resent: Y (changes to prep?)   Covid Test Rescheduled: Yes    Note any change or update to original order/sedation:

## 2022-04-28 NOTE — TELEPHONE ENCOUNTER
Caller: Emmanuel Topete    Procedure: Colon    Date, Location, and Surgeon of Procedure Cancelled: 6/3, [facility], Wise    Ordering Provider:Marybel Hutchison MD     Reason for cancel (please be detailed, any staff messages or encounters to note?): Provider on vacation        Rescheduled: N, Left VM to reschedule      If rescheduled:    Date:    Location:    Prep Resent: (changes to prep?)   Covid Test Rescheduled: Yes and No   Note any change or update to original order/sedation:

## 2022-07-07 ENCOUNTER — TELEPHONE (OUTPATIENT)
Dept: GASTROENTEROLOGY | Facility: CLINIC | Age: 47
End: 2022-07-07

## 2022-07-07 NOTE — TELEPHONE ENCOUNTER
Caller: Yoselyn     Procedure: Colon     Date, Location, and Surgeon of Procedure Cancelled: 6/3, [facility], Wise     Ordering Provider:Marybel Hutchison MD      Reason for cancel (please be detailed, any staff messages or encounters to note?): Provider on vacation           Rescheduled: Y              If rescheduled:              Date: 9/16ABDIRAHMAN             Location: [facility] [place]             Prep Resent: miralax (changes to prep?)             Covid Test Rescheduled home test : Yes and No             Note any change or update to original order/sedation: still MAC - anxiety, PTSD or psychosis

## 2022-07-10 ENCOUNTER — MYC MEDICAL ADVICE (OUTPATIENT)
Dept: FAMILY MEDICINE | Facility: CLINIC | Age: 47
End: 2022-07-10

## 2022-07-21 ENCOUNTER — ALLIED HEALTH/NURSE VISIT (OUTPATIENT)
Dept: FAMILY MEDICINE | Facility: CLINIC | Age: 47
End: 2022-07-21
Payer: COMMERCIAL

## 2022-07-21 DIAGNOSIS — Z23 HIGH PRIORITY FOR 2019-NCOV VACCINE: Primary | ICD-10-CM

## 2022-07-21 PROCEDURE — 0054A COVID-19,PF,PFIZER (12+ YRS): CPT

## 2022-07-21 PROCEDURE — 99207 PR NO CHARGE NURSE ONLY: CPT

## 2022-07-21 PROCEDURE — 91305 COVID-19,PF,PFIZER (12+ YRS): CPT

## 2022-07-26 ENCOUNTER — MYC MEDICAL ADVICE (OUTPATIENT)
Dept: FAMILY MEDICINE | Facility: CLINIC | Age: 47
End: 2022-07-26

## 2022-07-26 ENCOUNTER — TRANSFERRED RECORDS (OUTPATIENT)
Dept: HEALTH INFORMATION MANAGEMENT | Facility: CLINIC | Age: 47
End: 2022-07-26

## 2022-07-26 DIAGNOSIS — L98.9 SKIN LESION: Primary | ICD-10-CM

## 2022-07-28 NOTE — TELEPHONE ENCOUNTER
PN,  Please see below.  Started referral.  Please authorize if appropriate.  Thanks,  Tori Edwards RN

## 2022-07-29 ENCOUNTER — MYC REFILL (OUTPATIENT)
Dept: FAMILY MEDICINE | Facility: CLINIC | Age: 47
End: 2022-07-29

## 2022-07-29 DIAGNOSIS — F41.1 GAD (GENERALIZED ANXIETY DISORDER): ICD-10-CM

## 2022-07-29 RX ORDER — BUPROPION HYDROCHLORIDE 100 MG/1
100 TABLET, EXTENDED RELEASE ORAL DAILY
Qty: 90 TABLET | Refills: 0 | Status: CANCELLED | OUTPATIENT
Start: 2022-07-29

## 2022-08-14 ENCOUNTER — NURSE TRIAGE (OUTPATIENT)
Dept: NURSING | Facility: CLINIC | Age: 47
End: 2022-08-14

## 2022-08-14 ENCOUNTER — MYC MEDICAL ADVICE (OUTPATIENT)
Dept: FAMILY MEDICINE | Facility: CLINIC | Age: 47
End: 2022-08-14

## 2022-08-14 NOTE — TELEPHONE ENCOUNTER
1515  Caller states that she was on an [employer] cruise when she became ill and tested positive for Covid and was thrown off the ship  Into a hotel room in [facility]   Caller is advised that full triage and  medication orders cannot be  done internationally  Caller is advised to seek care locally for worsening symptoms and  possible antiviral treatment   Caller is stressed out and and anxious but reporting mild symptoms of Covid   Caller is advised that  she should continue  Isolation for  full 5 days from onset and if improved without fever for  5 days she can fly home if masked    Call suddenly ended and caller did not waldemar back   Maribel Viveros RN  FNA         Reason for Disposition    [1] COVID-19 diagnosed by positive lab test (e.g., PCR, rapid self-test kit) AND [2] mild symptoms (e.g., cough, fever, others) AND [3] no complications or SOB    Additional Information    Negative: Chest pain or pressure    Negative: Patient sounds very sick or weak to the triager    Negative: MILD difficulty breathing (e.g., minimal/no SOB at rest, SOB with walking, pulse <100)    Negative: Fever > 103 F (39.4 C)    Negative: [1] Fever > 101 F (38.3 C) AND [2] age > 60 years    Negative: [1] Fever > 100.0 F (37.8 C) AND [2] bedridden (e.g., nursing home patient, CVA, chronic illness, recovering from surgery)    Negative: HIGH RISK for severe COVID complications (e.g., weak immune system, age > 64 years, obesity with BMI > 25, pregnant, chronic lung disease or other chronic medical condition)  (Exception: Already seen by PCP and no new or worsening symptoms.)    Negative: [1] HIGH RISK patient AND [2] influenza is widespread in the community AND [3] ONE OR MORE respiratory symptoms: cough, sore throat, runny or stuffy nose    Negative: [1] HIGH RISK patient AND [2] influenza exposure within the last 7 days AND [3] ONE OR MORE respiratory symptoms: cough, sore throat, runny or stuffy nose    Negative: Oxygen level (e.g.,  pulse oximetry) 91 to 94 percent    Negative: [1] COVID-19 infection suspected by caller or triager AND [2] mild symptoms (cough, fever, or others) AND [3] negative COVID-19 rapid test    Negative: Cough present > 3 weeks    Negative: [1] COVID-19 diagnosed by positive lab test (e.g., PCR, rapid self-test kit) AND [2] NO symptoms (e.g., cough, fever, others)    Protocols used: CORONAVIRUS (COVID-19) DIAGNOSED OR [id number] 1.18.2022

## 2022-08-17 NOTE — TELEPHONE ENCOUNTER
Patient calling today   Thinks she's ok - has been sleeping all day  Did eat and take Tylenol and Ibuprofen  Taking for body aches and headaches  Required to quarantine for 10 days - in [place]  Today felt like hit by truck - symptoms are improving though   Lost taste and smell today  Advised she continue to monitor   Feels dizzy/off balance today   Does have congestion and headache though   Is getting food though to room - eating well - hydrated  Told pt to monitor for now  Isabella PANCHAL RN

## 2022-08-18 ENCOUNTER — TELEPHONE (OUTPATIENT)
Dept: FAMILY MEDICINE | Facility: CLINIC | Age: 47
End: 2022-08-18

## 2022-08-18 NOTE — TELEPHONE ENCOUNTER
Patient called.  See TE from yesterday.  States she is feeling worse, has a bad headache and some nausea.  Denies vomiting  Temp 99.0.   Tylenol is not helping headace.    Has been pushing fluids and resting    Quarantining in [place]  Asked patient if she has checked with hotel to see if doctor on site or near by.  States she was told he she needed anything can reach a provider by calling [phone].    Advised patient do this.  States she will call.    Elizabeth Elder RN

## 2022-08-30 ENCOUNTER — TELEPHONE (OUTPATIENT)
Dept: GASTROENTEROLOGY | Facility: CLINIC | Age: 47
End: 2022-08-30

## 2022-08-30 NOTE — TELEPHONE ENCOUNTER
Caller: Yoselyn Mcgill      Procedure: Colonoscopy    Date, Location, and Surgeon of Procedure Cancelled: 9/16, [facility], Wise N    Ordering Provider:Foster    Reason for cancel (please be detailed, any staff messages or encounters to note?): Patient scheduling conflict        Rescheduled: y     If rescheduled:    Date: 11/3   Location: [facility] [place]   Prep Resent: resent, no changes (changes to prep?)   Covid Test Rescheduled: home test   Note any change or update to original order/sedation: n/a

## 2022-09-20 ENCOUNTER — HOSPITAL ENCOUNTER (OUTPATIENT)
Dept: MAMMOGRAPHY | Facility: CLINIC | Age: 47
Discharge: HOME | End: 2022-09-20
Attending: FAMILY MEDICINE | Admitting: FAMILY MEDICINE
Payer: COMMERCIAL

## 2022-09-20 DIAGNOSIS — Z12.31 VISIT FOR SCREENING MAMMOGRAM: ICD-10-CM

## 2022-09-20 PROCEDURE — 77067 SCR MAMMO BI INCL CAD: CPT

## 2022-10-26 ENCOUNTER — TELEPHONE (OUTPATIENT)
Dept: GASTROENTEROLOGY | Facility: CLINIC | Age: 47
End: 2022-10-26

## 2022-10-26 DIAGNOSIS — Z12.11 ENCOUNTER FOR SCREENING COLONOSCOPY: Primary | ICD-10-CM

## 2022-10-26 RX ORDER — BISACODYL 5 MG/1
TABLET, DELAYED RELEASE ORAL
Qty: 4 TABLET | Refills: 0 | Status: SHIPPED | OUTPATIENT
Start: 2022-10-26 | End: 2022-11-03

## 2022-10-26 NOTE — TELEPHONE ENCOUNTER
Pre assessment questions completed for upcoming colonoscopy  procedure scheduled on 11/3/22    Positive Covid 8/12/22    Reviewed procedural arrival time 0625 and facility location [facility].    Designated  policy reviewed. Instructed to have someone stay 24 hours post procedure.     Procedure indication: screening     Bowel prep recommendation: Golytely d/t mg citrate recall.     Golytely prep script sent to [facility] #[id number] [address] pharmacy. Prep instructions sent via Elite Form.    Reviewed Golytely prep instructions. No fiber/iron supplements or foods that contain nuts/seeds 7 days prior to procedure.     Anticoagulation/blood thinners? no    Diabetic? no Patient to hold oral diabetic medications day of procedure if applicable.     Patient verbalized understanding and had no questions or concerns at this time.    Suellen Martinez RN

## 2022-11-02 ENCOUNTER — ANESTHESIA EVENT (OUTPATIENT)
Dept: SURGERY | Facility: AMBULATORY SURGERY CENTER | Age: 47
End: 2022-11-02
Payer: COMMERCIAL

## 2022-11-03 ENCOUNTER — ANESTHESIA (OUTPATIENT)
Dept: SURGERY | Facility: AMBULATORY SURGERY CENTER | Age: 47
End: 2022-11-03
Payer: COMMERCIAL

## 2022-11-03 ENCOUNTER — HOSPITAL ENCOUNTER (OUTPATIENT)
Facility: AMBULATORY SURGERY CENTER | Age: 47
Discharge: HOME | End: 2022-11-03
Attending: STUDENT IN AN ORGANIZED HEALTH CARE EDUCATION/TRAINING PROGRAM
Payer: COMMERCIAL

## 2022-11-03 VITALS
OXYGEN SATURATION: 99 % | BODY MASS INDEX: 25.4 KG/M2 | WEIGHT: 138 LBS | HEIGHT: 62 IN | SYSTOLIC BLOOD PRESSURE: 113 MMHG | HEART RATE: 61 BPM | TEMPERATURE: 97.3 F | RESPIRATION RATE: 16 BRPM | DIASTOLIC BLOOD PRESSURE: 72 MMHG

## 2022-11-03 VITALS — HEART RATE: 65 BPM

## 2022-11-03 LAB — COLONOSCOPY: NORMAL

## 2022-11-03 PROCEDURE — 45378 DIAGNOSTIC COLONOSCOPY: CPT | Mod: 33

## 2022-11-03 RX ORDER — ONDANSETRON 2 MG/ML
4 INJECTION INTRAMUSCULAR; INTRAVENOUS EVERY 6 HOURS PRN
Status: DISCONTINUED | OUTPATIENT
Start: 2022-11-03 | End: 2022-11-04 | Stop reason: HOSPADM

## 2022-11-03 RX ORDER — NALOXONE HYDROCHLORIDE 0.4 MG/ML
0.2 INJECTION, SOLUTION INTRAMUSCULAR; INTRAVENOUS; SUBCUTANEOUS
Status: DISCONTINUED | OUTPATIENT
Start: 2022-11-03 | End: 2022-11-04 | Stop reason: HOSPADM

## 2022-11-03 RX ORDER — PROPOFOL 10 MG/ML
INJECTION, EMULSION INTRAVENOUS PRN
Status: DISCONTINUED | OUTPATIENT
Start: 2022-11-03 | End: 2022-11-03

## 2022-11-03 RX ORDER — ONDANSETRON 4 MG/1
4 TABLET, ORALLY DISINTEGRATING ORAL EVERY 6 HOURS PRN
Status: DISCONTINUED | OUTPATIENT
Start: 2022-11-03 | End: 2022-11-04 | Stop reason: HOSPADM

## 2022-11-03 RX ORDER — FLUMAZENIL 0.1 MG/ML
0.2 INJECTION, SOLUTION INTRAVENOUS
Status: ACTIVE | OUTPATIENT
Start: 2022-11-03 | End: 2022-11-03

## 2022-11-03 RX ORDER — PROCHLORPERAZINE MALEATE 10 MG
10 TABLET ORAL EVERY 6 HOURS PRN
Status: DISCONTINUED | OUTPATIENT
Start: 2022-11-03 | End: 2022-11-04 | Stop reason: HOSPADM

## 2022-11-03 RX ORDER — NALOXONE HYDROCHLORIDE 0.4 MG/ML
0.4 INJECTION, SOLUTION INTRAMUSCULAR; INTRAVENOUS; SUBCUTANEOUS
Status: DISCONTINUED | OUTPATIENT
Start: 2022-11-03 | End: 2022-11-04 | Stop reason: HOSPADM

## 2022-11-03 RX ORDER — SODIUM CHLORIDE, SODIUM LACTATE, POTASSIUM CHLORIDE, CALCIUM CHLORIDE 600; 310; 30; 20 MG/100ML; MG/100ML; MG/100ML; MG/100ML
INJECTION, SOLUTION INTRAVENOUS CONTINUOUS
Status: DISCONTINUED | OUTPATIENT
Start: 2022-11-03 | End: 2022-11-03 | Stop reason: HOSPADM

## 2022-11-03 RX ORDER — LIDOCAINE 40 MG/G
CREAM TOPICAL
Status: DISCONTINUED | OUTPATIENT
Start: 2022-11-03 | End: 2022-11-03 | Stop reason: HOSPADM

## 2022-11-03 RX ORDER — ONDANSETRON 2 MG/ML
4 INJECTION INTRAMUSCULAR; INTRAVENOUS
Status: DISCONTINUED | OUTPATIENT
Start: 2022-11-03 | End: 2022-11-03 | Stop reason: HOSPADM

## 2022-11-03 RX ADMIN — PROPOFOL 30 MG: 10 INJECTION, EMULSION INTRAVENOUS at 07:21

## 2022-11-03 RX ADMIN — SODIUM CHLORIDE, SODIUM LACTATE, POTASSIUM CHLORIDE, CALCIUM CHLORIDE: 600; 310; 30; 20 INJECTION, SOLUTION INTRAVENOUS at 07:16

## 2022-11-03 RX ADMIN — PROPOFOL 200 MCG/KG/MIN: 10 INJECTION, EMULSION INTRAVENOUS at 07:19

## 2022-11-03 RX ADMIN — PROPOFOL 30 MG: 10 INJECTION, EMULSION INTRAVENOUS at 07:24

## 2022-11-03 RX ADMIN — PROPOFOL 30 MG: 10 INJECTION, EMULSION INTRAVENOUS at 07:38

## 2022-11-03 RX ADMIN — SODIUM CHLORIDE, SODIUM LACTATE, POTASSIUM CHLORIDE, CALCIUM CHLORIDE: 600; 310; 30; 20 INJECTION, SOLUTION INTRAVENOUS at 07:33

## 2022-11-03 RX ADMIN — PROPOFOL 30 MG: 10 INJECTION, EMULSION INTRAVENOUS at 07:25

## 2022-11-03 NOTE — ANESTHESIA CARE TRANSFER NOTE
Patient: Yoselyn Mcgill    Procedure: Procedure(s):  COLONOSCOPY       Diagnosis: Special screening for malignant neoplasms, colon [Z12.11]  Diagnosis Additional Information: No value filed.    Anesthesia Type:   MAC     Note:    Oropharynx: oropharynx clear of all foreign objects and spontaneously breathing  Level of Consciousness: awake  Oxygen Supplementation: room air    Independent Airway: airway patency satisfactory and stable  Dentition: dentition unchanged  Vital Signs Stable: post-procedure vital signs reviewed and stable  Report to RN Given: handoff report given  Patient transferred to: Phase II    Handoff Report: Identifed the Patient, Identified the Reponsible Provider, Reviewed the pertinent medical history, Discussed the surgical course, Reviewed Intra-OP anesthesia mangement and issues during anesthesia, Set expectations for post-procedure period and Allowed opportunity for questions and acknowledgement of understanding      Vitals:  Vitals Value Taken Time   BP 97/68    Temp 97.3    Pulse 67    Resp 16    SpO2 99        Electronically Signed By: JANICE Eubanks CRNA  November 3, 2022  7:44 AM

## 2022-11-03 NOTE — DISCHARGE INSTRUCTIONS
Discharge Instructions after Colonoscopy or Sigmoidoscopy       Today you had a ____ Colonoscopy       Activity and Diet   You were given medicine for pain. You may be dizzy or sleepy.   For 24 hours:    Do not drive or use heavy equipment.    Do not make important decisions.    Do not drink any alcohol.   You may return to your normal diet and medicines.       Discomfort    Air was placed in your colon during the exam in order to see it. Walking helps to pass the air.    You may take Tylenol (acetaminophen) for pain unless your doctor has told you not to.   Do not take aspirin or ibuprofen (Advil, Motrin, or other anti-inflammatory   drugs) for __3__ days.       Follow-up   ____ We took small tissue samples or polyps to study. Your doctor will call you with the results within two weeks.       When to call:       Call right away if you have:    Unusual pain in belly or chest pain not relieved with passing air.    More than 1 to 2 Tablespoons of bleeding from your rectum.    Fever above 100.6  F (37.5  C).       If you have severe pain, bleeding, or shortness of breath, go to an emergency room.       If you have questions, call:   Monday to Friday, 7 a.m. to 4:30 p.m.   Endoscopy: [phone] (We may have to call you back)       After hours   Hospital: [phone] (Ask for the GI fellow on call)

## 2022-11-03 NOTE — ANESTHESIA PREPROCEDURE EVALUATION
Anesthesia Pre-Procedure Evaluation    Patient: Yoselyn Mcgill   MRN: [id number] : 1975        Procedure : Procedure(s):  COLONOSCOPY          Past Medical History:   Diagnosis Date     Anxiety state, unspecified     ECT [id number], multiple meds, off for 2 yrs.       HPV (human papilloma virus) infection       Past Surgical History:   Procedure Laterality Date     ANKLE SURGERY  08/10/11      SECTION N/A 2015    Procedure:  SECTION;  Surgeon: Emma Kendall MD;  [facility]     DILATION AND CURETTAGE, OPERATIVE HYSTEROSCOPY WITH MORCELLATOR, COMBINED N/A 2021    Procedure: HYSTEROSCOPY, WITH DILATION AND CURETTAGE OF UTERUS USING MORCELLATOR;  Surgeon: Marybel Hutchison MD;  [facility] OR     ELECTROCONVULSIVE THERAPY  -    [facility] Dr [facility]      Allergies   Allergen Reactions     Adhesive Tape      EKG patches cause a rash     Amoxicillin Rash      Social History     Tobacco Use     Smoking status: Never     Smokeless tobacco: Never   Substance Use Topics     Alcohol use: Yes     Alcohol/week: 0.0 standard drinks     Comment: socially      Wt Readings from Last 1 Encounters:   22 61.2 kg (135 lb)        Anesthesia Evaluation            ROS/MED HX  ENT/Pulmonary:  - neg pulmonary ROS     Neurologic:  - neg neurologic ROS     Cardiovascular:  - neg cardiovascular ROS     METS/Exercise Tolerance:     Hematologic:  - neg hematologic  ROS     Musculoskeletal:       GI/Hepatic:     (+) bowel prep,     Renal/Genitourinary:       Endo:       Psychiatric/Substance Use:  - neg psychiatric ROS     Infectious Disease:       Malignancy:       Other:               OUTSIDE LABS:  CBC:   Lab Results   Component Value Date    WBC 4.8 2021    WBC 7.0 2021    HGB 13.0 2021    HGB 12.9 2021    HCT 38.6 2021    HCT 39.5 2021     2021     2021     BMP:   Lab Results   Component Value Date      10/27/2020     12/18/2019    POTASSIUM 4.2 10/27/2020    POTASSIUM 3.8 12/18/2019    CHLORIDE 106 10/27/2020    CHLORIDE 104 12/18/2019    CO2 28 10/27/2020    CO2 28 12/18/2019    BUN 13 10/27/2020    BUN 15 12/18/2019    CR 0.79 10/27/2020    CR 0.76 12/18/2019    GLC 90 10/27/2020    GLC 89 12/18/2019     COAGS: No results found for: PTT, INR, FIBR  POC:   Lab Results   Component Value Date    BGM 84 02/12/2021    HCG Negative 02/12/2021     HEPATIC:   Lab Results   Component Value Date    ALBUMIN 3.6 10/27/2020    PROTTOTAL 7.8 10/27/2020    ALT 22 10/27/2020    AST 19 10/27/2020    ALKPHOS 49 10/27/2020    BILITOTAL 0.3 10/27/2020     OTHER:   Lab Results   Component Value Date    CHUCK 9.2 10/27/2020    TSH 0.76 05/01/2019       Anesthesia Plan    ASA Status:  1      Anesthesia Type: MAC.     - Reason for MAC: immobility needed              Consents    Anesthesia Plan(s) and associated risks, benefits, and realistic alternatives discussed. Questions answered and patient/representative(s) expressed understanding.     - Discussed: Risks, Benefits and Alternatives for BOTH SEDATION and the PROCEDURE were discussed     - Discussed with:  Patient      - Extended Intubation/Ventilatory Support Discussed: No.      - Patient is DNR/DNI Status: No    Use of blood products discussed: No .     Postoperative Care            Comments:           H&P reviewed: Unable to attach H&P to encounter due to EHR limitations. H&P Update: appropriate H&P reviewed, patient examined. No interval changes since H&P (within 30 days).         Rogelio Murphy MD, MD

## 2022-11-03 NOTE — H&P
Yoselyn Mcgill  [id number]  female  47 year old      Reason for procedure/surgery: screening colonoscopy, dad with ? Polyps > 60    Patient Active Problem List   Diagnosis     Dysplasia of cervix     CARDIOVASCULAR SCREENING; LDL GOAL LESS THAN 160     Family history of malignant neoplasm of breast     YANIRA (generalized anxiety disorder)     Family history of hemochromatosis     Hereditary hemochromatosis (H)     Endometrium, polyp     Cyst of left ovary       Past Surgical History:    Past Surgical History:   Procedure Laterality Date     ANKLE SURGERY  08/10/11      SECTION N/A 2015    Procedure:  SECTION;  Surgeon: Emma Kendall MD;  [facility]: [facility]     DILATION AND CURETTAGE, OPERATIVE HYSTEROSCOPY WITH MORCELLATOR, COMBINED N/A 2021    Procedure: HYSTEROSCOPY, WITH DILATION AND CURETTAGE OF UTERUS USING MORCELLATOR;  Surgeon: Marybel Hutchison MD;  Location: [facility] OR     ELECTROCONVULSIVE THERAPY  -    [facility] Dr Carpenter       Past Medical History:   Past Medical History:   Diagnosis Date     Anxiety state, unspecified     ECT [id number], multiple meds, off for 2 yrs.       HPV (human papilloma virus) infection        Social History:   Social History     Tobacco Use     Smoking status: Never     Smokeless tobacco: Never   Substance Use Topics     Alcohol use: Yes     Alcohol/week: 0.0 standard drinks     Comment: socially       Family History:   Family History   Problem Relation Age of Onset     C.A.D. Mother      Hypertension Mother      Breast Cancer Mother 50     Other - See Comments Father         hereditary hemochromotosis     C.A.D. Maternal Grandfather      Hypertension Maternal Grandfather      Alcohol/Drug Maternal Grandfather      Depression Paternal Grandmother      C.A.D. Paternal Grandfather      Colon Cancer Maternal Aunt        Allergies:   Allergies   Allergen Reactions     Adhesive Tape      EKG patches cause a rash     Amoxicillin Rash        Active Medications:   Current Outpatient Medications   Medication Sig Dispense Refill     BALZIVA 0.4-35 MG-MCG tablet Take 1 tablet by mouth daily Active tablets only for prevention of menses 112 tablet 4     bisacodyl (DULCOLAX) 5 MG EC tablet Take as directed. One day before exam take 2 tablets at 3 PM. Take 2 tablets at 11 PM. 4 tablet 0     buPROPion (WELLBUTRIN SR) 100 MG 12 hr tablet TAKE 1 TABLET(100 MG) BY MOUTH DAILY 90 tablet 0     fluocinonide (LIDEX) 0.05 % external cream Apply topically 2 times daily 60 g 1     ibuprofen (ADVIL/MOTRIN) 600 MG tablet Take 1 tablet (600 mg) by mouth every 6 hours as needed for other (mild and/or inflammatory pain) 30 tablet 0     LORazepam (ATIVAN) 0.5 MG tablet TAKE 1 TABLET(0.5 MG) BY MOUTH EVERY 8 HOURS AS NEEDED FOR ANXIETY 20 tablet 1     Omega-3 Fatty Acids (OMEGA-3 FISH OIL PO) Take 1 g by mouth daily        polyethylene glycol (GOLYTELY) 236 g suspension Take as directed. One day before exam fill the jug with water. Cover and shake until well mixed. At 6 PM start drinking an 8oz glass of mixture every 15 minutes until jug is 1/2 empty. Store remainder in the refrigerator.  At 11 PM Start drinking the other half of the Golytely jug. Drink one 8-ounce glass every 15 minutes until the jug is empty. 4000 mL 0     sertraline (ZOLOFT) 50 MG tablet TAKE 1 AND 1/2 TABLETS(75 MG) BY MOUTH DAILY 135 tablet 0     traZODone (DESYREL) 50 MG tablet Take 1-2 tablets ( mg) by mouth At Bedtime 180 tablet 0     triamcinolone (ARISTOCORT HP) 0.5 % external cream Apply topically 2 times daily 15 g 0     valACYclovir (VALTREX) 1000 mg tablet Take 2 tablets (2,000 mg) by mouth 2 times daily for 1 day 4 tablet 1       Systemic Review:   CONSTITUTIONAL: NEGATIVE for fever, chills, change in weight  ENT/MOUTH: NEGATIVE for ear, mouth and throat problems  RESP: NEGATIVE for significant cough or SOB  CV: NEGATIVE for chest pain, palpitations or peripheral edema    Physical  "Examination:   Vital Signs: /76   Temp 98.4  F (36.9  C) (Temporal)   Resp 16   Ht 1.575 m (5' 2\")   Wt 62.6 kg (138 lb)   SpO2 97%   BMI 25.24 kg/m    GENERAL: healthy, alert and no distress  NECK: no adenopathy, no asymmetry, masses, or scars  RESP: lungs clear to auscultation - no rales, rhonchi or wheezes  CV: regular rate and rhythm, normal S1 S2, no S3 or S4, no murmur, click or rub, no peripheral edema and peripheral pulses strong  ABDOMEN: soft, nontender, no hepatosplenomegaly, no masses and bowel sounds normal  MS: no gross musculoskeletal defects noted, no edema      Plan: Appropriate to proceed as scheduled.      Anushka Vicente MD  11/3/2022    PCP:  Elisabeth Powell    "

## 2022-11-03 NOTE — ANESTHESIA POSTPROCEDURE EVALUATION
Patient: Yoselyn Mcgill    Procedure: Procedure(s):  COLONOSCOPY       Anesthesia Type:  MAC    Note:  Disposition: Outpatient   Postop Pain Control: Uneventful            Sign Out: Well controlled pain   PONV: No   Neuro/Psych: Uneventful            Sign Out: Acceptable/Baseline neuro status   Airway/Respiratory: Uneventful            Sign Out: Acceptable/Baseline resp. status   CV/Hemodynamics: Uneventful            Sign Out: Acceptable CV status; No obvious hypovolemia; No obvious fluid overload   Other NRE: NONE   DID A NON-ROUTINE EVENT OCCUR? No           Last vitals:  Vitals Value Taken Time   /72 11/03/22 0808   Temp 36.3  C (97.3  F) 11/03/22 0745   Pulse 61 11/03/22 0804   Resp 16 11/03/22 0804   SpO2 99 % 11/03/22 0804       Electronically Signed By: Rogelio Murphy MD, MD  November 3, 2022  2:16 PM

## 2022-11-13 DIAGNOSIS — F41.1 GENERALIZED ANXIETY DISORDER: ICD-10-CM

## 2022-11-15 RX ORDER — LORAZEPAM 0.5 MG/1
TABLET ORAL
Qty: 20 TABLET | Refills: 0 | Status: SHIPPED | OUTPATIENT
Start: 2022-11-15 | End: 2023-06-06

## 2022-11-30 ASSESSMENT — ENCOUNTER SYMPTOMS
CONSTIPATION: 0
EYE PAIN: 0
NERVOUS/ANXIOUS: 1
DIZZINESS: 0
PALPITATIONS: 0
HEMATURIA: 0
PARESTHESIAS: 0
FEVER: 0
ARTHRALGIAS: 1
DIARRHEA: 0
SORE THROAT: 0
DYSURIA: 0
NAUSEA: 0
CHILLS: 0
HEARTBURN: 0
MYALGIAS: 0
BREAST MASS: 0
SHORTNESS OF BREATH: 0
HEMATOCHEZIA: 0
HEADACHES: 1
JOINT SWELLING: 0
ABDOMINAL PAIN: 0
FREQUENCY: 0
COUGH: 0
WEAKNESS: 0

## 2022-11-30 ASSESSMENT — ACTIVITIES OF DAILY LIVING (ADL): CURRENT_FUNCTION: NO ASSISTANCE NEEDED

## 2022-12-07 ENCOUNTER — OFFICE VISIT (OUTPATIENT)
Dept: FAMILY MEDICINE | Facility: CLINIC | Age: 47
End: 2022-12-07
Payer: COMMERCIAL

## 2022-12-07 VITALS
HEART RATE: 74 BPM | DIASTOLIC BLOOD PRESSURE: 82 MMHG | OXYGEN SATURATION: 97 % | WEIGHT: 136.1 LBS | SYSTOLIC BLOOD PRESSURE: 122 MMHG | BODY MASS INDEX: 25.04 KG/M2 | HEIGHT: 62 IN | TEMPERATURE: 97.5 F

## 2022-12-07 DIAGNOSIS — Z00.00 ROUTINE GENERAL MEDICAL EXAMINATION AT A HEALTH CARE FACILITY: Primary | ICD-10-CM

## 2022-12-07 DIAGNOSIS — Z83.49 FAMILY HISTORY OF HEMOCHROMATOSIS: ICD-10-CM

## 2022-12-07 DIAGNOSIS — B00.1 HERPES LABIALIS: ICD-10-CM

## 2022-12-07 DIAGNOSIS — F41.1 GAD (GENERALIZED ANXIETY DISORDER): ICD-10-CM

## 2022-12-07 DIAGNOSIS — Z12.4 SCREENING FOR MALIGNANT NEOPLASM OF CERVIX: ICD-10-CM

## 2022-12-07 DIAGNOSIS — E83.110 HEREDITARY HEMOCHROMATOSIS (H): ICD-10-CM

## 2022-12-07 DIAGNOSIS — G47.09 OTHER INSOMNIA: ICD-10-CM

## 2022-12-07 DIAGNOSIS — C44.519 BASAL CELL CARCINOMA (BCC) OF BACK: ICD-10-CM

## 2022-12-07 LAB
ALBUMIN SERPL BCG-MCNC: 4.2 G/DL (ref 3.5–5.2)
ALP SERPL-CCNC: 46 U/L (ref 35–104)
ALT SERPL W P-5'-P-CCNC: 14 U/L (ref 10–35)
ANION GAP SERPL CALCULATED.3IONS-SCNC: 12 MMOL/L (ref 7–15)
AST SERPL W P-5'-P-CCNC: 24 U/L (ref 10–35)
BILIRUB SERPL-MCNC: 0.3 MG/DL
BUN SERPL-MCNC: 11 MG/DL (ref 6–20)
CALCIUM SERPL-MCNC: 8.9 MG/DL (ref 8.6–10)
CHLORIDE SERPL-SCNC: 102 MMOL/L (ref 98–107)
CHOLEST SERPL-MCNC: 215 MG/DL
CREAT SERPL-MCNC: 0.93 MG/DL (ref 0.51–0.95)
DEPRECATED HCO3 PLAS-SCNC: 24 MMOL/L (ref 22–29)
ERYTHROCYTE [DISTWIDTH] IN BLOOD BY AUTOMATED COUNT: 12 % (ref 10–15)
FERRITIN SERPL-MCNC: 54 NG/ML (ref 6–175)
GFR SERPL CREATININE-BSD FRML MDRD: 76 ML/MIN/1.73M2
GLUCOSE SERPL-MCNC: 86 MG/DL (ref 70–99)
HCT VFR BLD AUTO: 38.9 % (ref 35–47)
HDLC SERPL-MCNC: 69 MG/DL
HGB BLD-MCNC: 12.8 G/DL (ref 11.7–15.7)
IRON BINDING CAPACITY (ROCHE): 284 UG/DL (ref 240–430)
IRON SATN MFR SERPL: 47 % (ref 15–46)
IRON SERPL-MCNC: 133 UG/DL (ref 37–145)
LDLC SERPL CALC-MCNC: 119 MG/DL
MCH RBC QN AUTO: 31.7 PG (ref 26.5–33)
MCHC RBC AUTO-ENTMCNC: 32.9 G/DL (ref 31.5–36.5)
MCV RBC AUTO: 96 FL (ref 78–100)
NONHDLC SERPL-MCNC: 146 MG/DL
PLATELET # BLD AUTO: 245 10E3/UL (ref 150–450)
POTASSIUM SERPL-SCNC: 3.9 MMOL/L (ref 3.4–5.3)
PROT SERPL-MCNC: 6.9 G/DL (ref 6.4–8.3)
RBC # BLD AUTO: 4.04 10E6/UL (ref 3.8–5.2)
SODIUM SERPL-SCNC: 138 MMOL/L (ref 136–145)
TRIGL SERPL-MCNC: 134 MG/DL
WBC # BLD AUTO: 5 10E3/UL (ref 4–11)

## 2022-12-07 PROCEDURE — 83550 IRON BINDING TEST: CPT | Performed by: FAMILY MEDICINE

## 2022-12-07 PROCEDURE — 91313 COVID-19 VACCINE BIVALENT BOOSTER 18+ (MODERNA): CPT | Performed by: FAMILY MEDICINE

## 2022-12-07 PROCEDURE — 87624 HPV HI-RISK TYP POOLED RSLT: CPT | Performed by: FAMILY MEDICINE

## 2022-12-07 PROCEDURE — 80061 LIPID PANEL: CPT | Performed by: FAMILY MEDICINE

## 2022-12-07 PROCEDURE — 85027 COMPLETE CBC AUTOMATED: CPT | Performed by: FAMILY MEDICINE

## 2022-12-07 PROCEDURE — 99396 PREV VISIT EST AGE 40-64: CPT | Performed by: FAMILY MEDICINE

## 2022-12-07 PROCEDURE — G0145 SCR C/V CYTO,THINLAYER,RESCR: HCPCS | Performed by: FAMILY MEDICINE

## 2022-12-07 PROCEDURE — 82728 ASSAY OF FERRITIN: CPT | Performed by: FAMILY MEDICINE

## 2022-12-07 PROCEDURE — 83540 ASSAY OF IRON: CPT | Performed by: FAMILY MEDICINE

## 2022-12-07 PROCEDURE — 80053 COMPREHEN METABOLIC PANEL: CPT | Performed by: FAMILY MEDICINE

## 2022-12-07 PROCEDURE — 0134A COVID-19 VACCINE BIVALENT BOOSTER 18+ (MODERNA): CPT | Performed by: FAMILY MEDICINE

## 2022-12-07 PROCEDURE — 36415 COLL VENOUS BLD VENIPUNCTURE: CPT | Performed by: FAMILY MEDICINE

## 2022-12-07 RX ORDER — VALACYCLOVIR HYDROCHLORIDE 1 G/1
2000 TABLET, FILM COATED ORAL 2 TIMES DAILY
Qty: 4 TABLET | Refills: 4 | Status: SHIPPED | OUTPATIENT
Start: 2022-12-07 | End: 2024-02-21

## 2022-12-07 RX ORDER — BUPROPION HYDROCHLORIDE 100 MG/1
100 TABLET, EXTENDED RELEASE ORAL DAILY
Qty: 90 TABLET | Refills: 1 | Status: SHIPPED | OUTPATIENT
Start: 2022-12-07 | End: 2023-06-16

## 2022-12-07 RX ORDER — TRAZODONE HYDROCHLORIDE 50 MG/1
50-100 TABLET, FILM COATED ORAL AT BEDTIME
Qty: 180 TABLET | Refills: 1 | Status: SHIPPED | OUTPATIENT
Start: 2022-12-07 | End: 2024-02-21

## 2022-12-07 ASSESSMENT — ANXIETY QUESTIONNAIRES
7. FEELING AFRAID AS IF SOMETHING AWFUL MIGHT HAPPEN: NOT AT ALL
GAD7 TOTAL SCORE: 5
IF YOU CHECKED OFF ANY PROBLEMS ON THIS QUESTIONNAIRE, HOW DIFFICULT HAVE THESE PROBLEMS MADE IT FOR YOU TO DO YOUR WORK, TAKE CARE OF THINGS AT HOME, OR GET ALONG WITH OTHER PEOPLE: SOMEWHAT DIFFICULT
7. FEELING AFRAID AS IF SOMETHING AWFUL MIGHT HAPPEN: NOT AT ALL
GAD7 TOTAL SCORE: 5
1. FEELING NERVOUS, ANXIOUS, OR ON EDGE: SEVERAL DAYS
4. TROUBLE RELAXING: SEVERAL DAYS
5. BEING SO RESTLESS THAT IT IS HARD TO SIT STILL: NOT AT ALL
8. IF YOU CHECKED OFF ANY PROBLEMS, HOW DIFFICULT HAVE THESE MADE IT FOR YOU TO DO YOUR WORK, TAKE CARE OF THINGS AT HOME, OR GET ALONG WITH OTHER PEOPLE?: SOMEWHAT DIFFICULT
2. NOT BEING ABLE TO STOP OR CONTROL WORRYING: SEVERAL DAYS
6. BECOMING EASILY ANNOYED OR IRRITABLE: SEVERAL DAYS
3. WORRYING TOO MUCH ABOUT DIFFERENT THINGS: SEVERAL DAYS
GAD7 TOTAL SCORE: 5

## 2022-12-07 ASSESSMENT — ENCOUNTER SYMPTOMS
ABDOMINAL PAIN: 0
HEMATURIA: 0
DIARRHEA: 0
HEADACHES: 1
PARESTHESIAS: 0
DIZZINESS: 0
EYE PAIN: 0
SORE THROAT: 0
CONSTIPATION: 0
WEAKNESS: 0
SHORTNESS OF BREATH: 0
DYSURIA: 0
ARTHRALGIAS: 1
PALPITATIONS: 0
CHILLS: 0
MYALGIAS: 0
COUGH: 0
BREAST MASS: 0
NAUSEA: 0
HEARTBURN: 0
FEVER: 0
HEMATOCHEZIA: 0
NERVOUS/ANXIOUS: 1
FREQUENCY: 0
JOINT SWELLING: 0

## 2022-12-07 ASSESSMENT — PATIENT HEALTH QUESTIONNAIRE - PHQ9
10. IF YOU CHECKED OFF ANY PROBLEMS, HOW DIFFICULT HAVE THESE PROBLEMS MADE IT FOR YOU TO DO YOUR WORK, TAKE CARE OF THINGS AT HOME, OR GET ALONG WITH OTHER PEOPLE: SOMEWHAT DIFFICULT
SUM OF ALL RESPONSES TO PHQ QUESTIONS 1-9: 1
SUM OF ALL RESPONSES TO PHQ QUESTIONS 1-9: 1

## 2022-12-07 ASSESSMENT — ACTIVITIES OF DAILY LIVING (ADL): CURRENT_FUNCTION: NO ASSISTANCE NEEDED

## 2022-12-07 NOTE — NURSING NOTE
Prior to immunization administration, verified patients identity using patient s name and date of birth. Please see Immunization Activity for additional information.     Screening Questionnaire for Adult Immunization    Are you sick today?   No   Do you have allergies to medications, food, a vaccine component or latex?   No   Have you ever had a serious reaction after receiving a vaccination?   No   Do you have a long-term health problem with heart, lung, kidney, or metabolic disease (e.g., diabetes), asthma, a blood disorder, no spleen, complement component deficiency, a cochlear implant, or a spinal fluid leak?  Are you on long-term aspirin therapy?   No   Do you have cancer, leukemia, HIV/AIDS, or any other immune system problem?   No   Do you have a parent, brother, or sister with an immune system problem?   No   In the past 3 months, have you taken medications that affect  your immune system, such as prednisone, other steroids, or anticancer drugs; drugs for the treatment of rheumatoid arthritis, Crohn s disease, or psoriasis; or have you had radiation treatments?   No   Have you had a seizure, or a brain or other nervous system problem?   No   During the past year, have you received a transfusion of blood or blood    products, or been given immune (gamma) globulin or antiviral drug?   No   For women: Are you pregnant or is there a chance you could become       pregnant during the next month?   No   Have you received any vaccinations in the past 4 weeks?   No     Immunization questionnaire answers were all negative.        Per orders of Dr. Powell, injection of Moderna booster given by Wendy Cavanaugh. Patient instructed to remain in clinic for 15 minutes afterwards, and to report any adverse reaction to me immediately.       Screening performed by Wendy Cavanaugh on 12/7/2022 at 9:37 AM.

## 2022-12-07 NOTE — PROGRESS NOTES
"   SUBJECTIVE:   CC: Yoselyn is an 47 year old who presents for preventive health visit.      Healthy Habits:     In general, how would you rate your overall health?  Good    Frequency of exercise:  4-5 days/week    Duration of exercise:  30-45 minutes    Do you usually eat at least 4 servings of fruit and vegetables a day, include whole grains    & fiber and avoid regularly eating high fat or \"junk\" foods?  Yes    Taking medications regularly:  Yes    Medication side effects:  Not applicable    Ability to successfully perform activities of daily living:  No assistance needed    Home Safety:  No safety concerns identified    Hearing Impairment:  No hearing concerns    In the past 6 months, have you been bothered by leaking of urine?  No    In general, how would you rate your overall mental or emotional health?  Good      PHQ-2 Total Score: 0    Additional concerns today:  No  Answers for HPI/ROS submitted by the patient on 12/7/2022  If you checked off any problems, how difficult have these problems made it for you to do your work, take care of things at home, or get along with other people?: Somewhat difficult  PHQ9 TOTAL SCORE: 1  YANIRA 7 TOTAL SCORE: 5            -------------------------------------    Today's PHQ-2 Score:   PHQ-2 ( 1999 [employer]) 11/30/2022   Q1: Little interest or pleasure in doing things 0   Q2: Feeling down, depressed or hopeless 0   PHQ-2 Score 0   PHQ-2 Total Score (12-17 Years)- Positive if 3 or more points; Administer PHQ-A if positive -   Q1: Little interest or pleasure in doing things Not at all   Q2: Feeling down, depressed or hopeless Not at all   PHQ-2 Score 0       Have you ever done Advance Care Planning? (For example, a Health Directive, POLST, or a discussion with a medical provider or your loved ones about your wishes): No, advance care planning information given to patient to review.  Patient declined advance care planning discussion at this time.    Social History     Tobacco Use "     Smoking status: Never     Smokeless tobacco: Never   Substance Use Topics     Alcohol use: Yes     Alcohol/week: 0.0 standard drinks     Comment: socially     If you drink alcohol do you typically have >3 drinks per day or >7 drinks per week? No    Alcohol Use 2022   Prescreen: >3 drinks/day or >7 drinks/week? No   Prescreen: >3 drinks/day or >7 drinks/week? -   No flowsheet data found.    Reviewed orders with patient.  Reviewed health maintenance and updated orders accordingly - Yes  Patient Active Problem List   Diagnosis     Dysplasia of cervix     CARDIOVASCULAR SCREENING; LDL GOAL LESS THAN 160     Family history of malignant neoplasm of breast     YANIRA (generalized anxiety disorder)     Family history of hemochromatosis     Hereditary hemochromatosis (H)     Endometrium, polyp     Cyst of left ovary     Basal cell carcinoma (BCC) of back     Past Surgical History:   Procedure Laterality Date     ANKLE SURGERY  08/10/2011     APPENDECTOMY        SECTION N/A 2015    Procedure:  SECTION;  Surgeon: Emma Kendall MD;  [facility]     COLONOSCOPY N/A 2022    Procedure: COLONOSCOPY;  Surgeon: Anushka Vicente MD;  Location: [facility] OR     DILATION AND CURETTAGE, OPERATIVE HYSTEROSCOPY WITH MORCELLATOR, COMBINED N/A 2021    Procedure: HYSTEROSCOPY, WITH DILATION AND CURETTAGE OF UTERUS USING MORCELLATOR;  Surgeon: Marybel Hutchison MD;  [facility]     ELECTROCONVULSIVE THERAPY  -    [facility] Dr Carpenter     ORTHOPEDIC SURGERY         Social History     Tobacco Use     Smoking status: Never     Smokeless tobacco: Never   Substance Use Topics     Alcohol use: Yes     Comment: socially     Family History   Problem Relation Age of Onset     C.A.D. Mother      Hypertension Mother      Breast Cancer Mother      Other - See Comments Father         hereditary hemochromotosis     C.A.D. Maternal Grandfather      Hypertension Maternal Grandfather       Alcohol/Drug Maternal Grandfather      Depression Paternal Grandmother      C.AIvanD. Paternal Grandfather      Colon Cancer Maternal Aunt            Breast Cancer Screening:    FHS-7:   Breast CA Risk Assessment (FHS-7) 8/13/2021 9/20/2022 11/30/2022   Did any of your first-degree relatives have breast or ovarian cancer? Yes Yes Yes   Did any of your relatives have bilateral breast cancer? No No No   Did any man in your family have breast cancer? No No No   Did any woman in your family have breast and ovarian cancer? No No No   Did any woman in your family have breast cancer before age 50 y? Yes Yes Unknown   Do you have 2 or more relatives with breast and/or ovarian cancer? No No No   Do you have 2 or more relatives with breast and/or bowel cancer? No No No       Mammogram Screening: Recommended annual mammography  Pertinent mammograms are reviewed under the imaging tab.    History of abnormal Pap smear: YES - updated in Problem List and Health Maintenance accordingly  PAP / HPV Latest Ref Rng & Units 8/7/2018 9/4/2015 4/11/2014   PAP (Historical) - NIL NIL NIL   HPV16 NEG:Negative Negative Negative -   HPV18 NEG:Negative Negative Negative -   HRHPV NEG:Negative Negative Negative -     Reviewed and updated as needed this visit by clinical staff                  Reviewed and updated as needed this visit by Provider                     Review of Systems   Constitutional: Negative for chills and fever.   HENT: Negative for congestion, ear pain, hearing loss and sore throat.    Eyes: Negative for pain and visual disturbance.   Respiratory: Negative for cough and shortness of breath.    Cardiovascular: Negative for chest pain, palpitations and peripheral edema.   Gastrointestinal: Negative for abdominal pain, constipation, diarrhea, heartburn, hematochezia and nausea.   Breasts:  Negative for tenderness, breast mass and discharge.   Genitourinary: Negative for dysuria, frequency, genital sores, hematuria, pelvic pain,  urgency, vaginal bleeding and vaginal discharge.   Musculoskeletal: Positive for arthralgias. Negative for joint swelling and myalgias.   Skin: Negative for rash.   Neurological: Positive for headaches. Negative for dizziness, weakness and paresthesias.   Psychiatric/Behavioral: Negative for mood changes. The patient is nervous/anxious.           OBJECTIVE:   There were no vitals taken for this visit.  Physical Exam  GENERAL: healthy, alert and no distress  EYES: Eyes grossly normal to inspection, PERRL and conjunctivae and sclerae normal  HENT: ear canals and TM's normal, nose and mouth without ulcers or lesions  NECK: no adenopathy, no asymmetry, masses, or scars and thyroid normal to palpation  RESP: lungs clear to auscultation - no rales, rhonchi or wheezes  BREAST: normal without masses, tenderness or nipple discharge and no palpable axillary masses or adenopathy  CV: regular rate and rhythm, normal S1 S2, no S3 or S4, no murmur, click or rub, no peripheral edema and peripheral pulses strong  ABDOMEN: soft, nontender, no hepatosplenomegaly, no masses and bowel sounds normal   (female): normal female external genitalia, normal urethral meatus, vaginal mucosa pink, moist, well rugated, and normal cervix/adnexa/uterus without masses or discharge  MS: no gross musculoskeletal defects noted, no edema  SKIN: no suspicious lesions or rashes  NEURO: Normal strength and tone, mentation intact and speech normal  PSYCH: mentation appears normal, affect normal/bright    Diagnostic Test Results:  Labs reviewed in Epic    ASSESSMENT/PLAN:   (Z00.00) Routine general medical examination at a health care facility  (primary encounter diagnosis)  Comment:    Plan: Lipid panel reflex to direct LDL Fasting,         Comprehensive metabolic panel (BMP + Alb, Alk         Phos, ALT, AST, Total. Bili, TP)             (C44.519) Basal cell carcinoma (BCC) of back  Comment:  Follows with derm annually   Plan: Comprehensive metabolic  "panel (BMP + Alb, Alk         Phos, ALT, AST, Total. Bili, TP)             (F41.1) YANIRA (generalized anxiety disorder)  Comment:    Plan: Comprehensive metabolic panel (BMP + Alb, Alk         Phos, ALT, AST, Total. Bili, TP), sertraline         (ZOLOFT) 50 MG tablet, buPROPion (WELLBUTRIN         SR) 100 MG 12 hr tablet             (G47.09) Other insomnia  Comment:    Plan: Comprehensive metabolic panel (BMP + Alb, Alk         Phos, ALT, AST, Total. Bili, TP), traZODone         (DESYREL) 50 MG tablet             (B00.1) Herpes labialis  Comment:    Plan: Comprehensive metabolic panel (BMP + Alb, Alk         Phos, ALT, AST, Total. Bili, TP), valACYclovir         (VALTREX) 1000 mg tablet             (Z12.4) Screening for malignant neoplasm of cervix  Comment:    Plan: Pap screen with HPV - recommended age 30 - 65         years, Comprehensive metabolic panel (BMP +         Alb, Alk Phos, ALT, AST, Total. Bili, TP)             (Z83.49) Family history of hemochromatosis  Comment:    Plan: Comprehensive metabolic panel (BMP + Alb, Alk         Phos, ALT, AST, Total. Bili, TP)             (E83.110) Hereditary hemochromatosis (H)  Comment:    Plan: Comprehensive metabolic panel (BMP + Alb, Alk         Phos, ALT, AST, Total. Bili, TP), CBC with         platelets, Ferritin, Iron and iron binding         capacity                     COUNSELING:  Reviewed preventive health counseling, as reflected in patient instructions      BMI:   Estimated body mass index is 25.24 kg/m  as calculated from the following:    Height as of 11/3/22: 1.575 m (5' 2\").    Weight as of 11/3/22: 62.6 kg (138 lb).         She reports that she has never smoked. She has never used smokeless tobacco.       Elisabeth Powell, [facility]"

## 2022-12-07 NOTE — RESULT ENCOUNTER NOTE
Dear Yoselyn,   Your test results are all back -   -Normal red blood cell (hgb) levels, normal white blood cell count and normal platelet levels.  -Cholesterol levels (LDL,HDL, Triglycerides) are improved!  ADVISE: rechecking in 1 year.   -Liver and gallbladder tests are normal (ALT,AST, Alk phos, bilirubin), kidney function is normal (Cr, GFR), sodium is normal, potassium is normal, calcium is normal, glucose is normal.  -Ferritin (iron) level is normal.  Let us know if you have any questions.  -Elisabeth Powell, DO

## 2022-12-09 LAB
BKR LAB AP GYN ADEQUACY: NORMAL
BKR LAB AP GYN INTERPRETATION: NORMAL
BKR LAB AP HPV REFLEX: NORMAL
BKR LAB AP PREVIOUS ABNORMAL: NORMAL
PATH REPORT.COMMENTS IMP SPEC: NORMAL
PATH REPORT.COMMENTS IMP SPEC: NORMAL
PATH REPORT.RELEVANT HX SPEC: NORMAL

## 2022-12-13 LAB
HUMAN PAPILLOMA VIRUS 16 DNA: NEGATIVE
HUMAN PAPILLOMA VIRUS 18 DNA: NEGATIVE
HUMAN PAPILLOMA VIRUS FINAL DIAGNOSIS: NORMAL
HUMAN PAPILLOMA VIRUS OTHER HR: NEGATIVE

## 2023-01-17 ENCOUNTER — E-VISIT (OUTPATIENT)
Dept: FAMILY MEDICINE | Facility: CLINIC | Age: 48
End: 2023-01-17
Payer: COMMERCIAL

## 2023-01-17 DIAGNOSIS — H10.31 ACUTE BACTERIAL CONJUNCTIVITIS OF RIGHT EYE: Primary | ICD-10-CM

## 2023-01-17 PROCEDURE — 99421 OL DIG E/M SVC 5-10 MIN: CPT | Performed by: FAMILY MEDICINE

## 2023-01-17 RX ORDER — POLYMYXIN B SULFATE AND TRIMETHOPRIM 1; 10000 MG/ML; [USP'U]/ML
SOLUTION OPHTHALMIC
Qty: 10 ML | Refills: 0 | Status: SHIPPED | OUTPATIENT
Start: 2023-01-17 | End: 2023-01-24

## 2023-01-17 NOTE — PATIENT INSTRUCTIONS
Thank you for choosing us for your care. I have placed an order for a prescription so that you can start treatment. View your full visit summary for details by clicking on the link below. Your pharmacist will able to address any questions you may have about the medication.     If you re not feeling better within 2-3 days, please schedule an appointment.  You can schedule an appointment right here in [facility], or call [phone]  If the visit is for the same symptoms as your eVisit, we ll refund the cost of your eVisit if seen within seven days.      Bacterial Conjunctivitis    You have an infection in the membranes covering the white part of the eye. This part of the eye is called the conjunctiva. The infection is called conjunctivitis. The most common symptoms of conjunctivitis include a thick, pus-like discharge from the eye, swollen eyelids, redness, eyelids sticking together upon awakening, and a gritty or scratchy feeling in the eye. Your infection was caused by bacteria. It may be treated with medicine. With treatment, the infection takes about 7 to 10 days to resolve.   Home care    Use prescribed antibiotic eye drops or ointment as directed to treat the infection.    Apply a warm compress (towel soaked in warm water) to the affected eye 3 to 4 times a day. Do this just before applying medicine to the eye.    Use a warm, wet cloth to wipe away crusting of the eyelids in the morning. This is caused by mucus drainage during the night. You may also use saline irrigating solution or artificial tears to rinse away mucus in the eye. Do not put a patch over the eye.    Wash your hands before and after touching the infected eye. This is to prevent spreading the infection to the other eye, and to other people. Don't share your towels or washcloths with others.    You may use acetaminophen or ibuprofen to control pain, unless another medicine was prescribed. Talk with your healthcare provider before using these  medicines if you have chronic liver or kidney disease. Also talk with your provider if you have ever had a stomach ulcer or digestive bleeding.    Don't wear contact lenses until your eyes have healed and all symptoms are gone.    Follow-up care  Follow up with your healthcare provider, or as advised.  When to seek medical advice  Call your healthcare provider right away if any of these occur:    Worsening vision    Increasing pain in the eye    Increasing swelling or redness of the eyelid    Redness spreading around the eye  [employer] last reviewed this educational content on 4/1/2020 2000-2021 [employer]. All rights reserved. This information is not intended as a substitute for professional medical care. Always follow your healthcare professional's instructions.

## 2023-04-21 NOTE — TELEPHONE ENCOUNTER
Attempted to call. Parent is not accepting messages. Sent e-advice message asking for call back.    PN,  Patient states she has had insomnia issues for past 8-10 weeks.  Is able to fall asleep without difficulty, but tosses and turns throughout the night and can't sleep soundly throughout entire evening; usually wakes up and has difficulty falling back to sleep at 1am.  Taking Benadryl and Melatonin PRN.  Has also been using Ativan PRN for anxiety about going to bed; has 4-5 pills left; would like refill for upcoming trip.  Will be traveling in [place] 2/12/2017-2/19/2017.    States her sleep hygiene is good; goes to bed at same time every night.  Has cut coffee out.  Doing yoga daily or another activity.  Does not take naps during the day.    No recent stresses in home or work life; over holidays (Dec) had medical stresses d/t issues with toddler and dog; those have since been resolved.  States she has been suffering throughout the day for past 6-8 wks.    Advised appt with you; scheduled for this Friday at 3pm.  Has appt with therapist in the building at 10am.  Wondering if you will see her Friday AM instead.  You have a 745am and 1030am short appt available.  Please advise.  Isabella PANCHAL RN

## 2023-06-03 DIAGNOSIS — F41.1 GENERALIZED ANXIETY DISORDER: ICD-10-CM

## 2023-06-06 RX ORDER — LORAZEPAM 0.5 MG/1
TABLET ORAL
Qty: 20 TABLET | Refills: 0 | Status: SHIPPED | OUTPATIENT
Start: 2023-06-06

## 2023-06-09 ASSESSMENT — ENCOUNTER SYMPTOMS
PARESTHESIAS: 0
HEARTBURN: 1
EYE PAIN: 0
CHILLS: 0
COUGH: 0
DIARRHEA: 0
PALPITATIONS: 0
MYALGIAS: 1
JOINT SWELLING: 1
DIZZINESS: 0
ARTHRALGIAS: 1
CONSTIPATION: 1
HEMATOCHEZIA: 0
DYSURIA: 0
HEADACHES: 1
FEVER: 0
WEAKNESS: 0
SORE THROAT: 0
NAUSEA: 0
ABDOMINAL PAIN: 1
HEMATURIA: 0
FREQUENCY: 0
BREAST MASS: 0
NERVOUS/ANXIOUS: 1
SHORTNESS OF BREATH: 0

## 2023-06-09 ASSESSMENT — ACTIVITIES OF DAILY LIVING (ADL): CURRENT_FUNCTION: NO ASSISTANCE NEEDED

## 2023-06-16 ENCOUNTER — OFFICE VISIT (OUTPATIENT)
Dept: FAMILY MEDICINE | Facility: CLINIC | Age: 48
End: 2023-06-16
Payer: COMMERCIAL

## 2023-06-16 VITALS
HEIGHT: 64 IN | OXYGEN SATURATION: 98 % | SYSTOLIC BLOOD PRESSURE: 116 MMHG | WEIGHT: 140 LBS | BODY MASS INDEX: 23.9 KG/M2 | HEART RATE: 72 BPM | RESPIRATION RATE: 17 BRPM | TEMPERATURE: 97.2 F | DIASTOLIC BLOOD PRESSURE: 68 MMHG

## 2023-06-16 DIAGNOSIS — F41.1 GAD (GENERALIZED ANXIETY DISORDER): ICD-10-CM

## 2023-06-16 DIAGNOSIS — Z01.419 ENCOUNTER FOR GYNECOLOGICAL EXAMINATION WITHOUT ABNORMAL FINDING: ICD-10-CM

## 2023-06-16 DIAGNOSIS — Z83.49 FAMILY HISTORY OF HEMOCHROMATOSIS: ICD-10-CM

## 2023-06-16 DIAGNOSIS — M25.551 HIP PAIN, RIGHT: Primary | ICD-10-CM

## 2023-06-16 DIAGNOSIS — E83.110 HEREDITARY HEMOCHROMATOSIS (H): ICD-10-CM

## 2023-06-16 LAB
ERYTHROCYTE [DISTWIDTH] IN BLOOD BY AUTOMATED COUNT: 11.9 % (ref 10–15)
FERRITIN SERPL-MCNC: 42 NG/ML (ref 6–175)
HCT VFR BLD AUTO: 37.6 % (ref 35–47)
HGB BLD-MCNC: 12.5 G/DL (ref 11.7–15.7)
IRON BINDING CAPACITY (ROCHE): 295 UG/DL (ref 240–430)
IRON SATN MFR SERPL: 42 % (ref 15–46)
IRON SERPL-MCNC: 123 UG/DL (ref 37–145)
MCH RBC QN AUTO: 30.6 PG (ref 26.5–33)
MCHC RBC AUTO-ENTMCNC: 33.2 G/DL (ref 31.5–36.5)
MCV RBC AUTO: 92 FL (ref 78–100)
PLATELET # BLD AUTO: 240 10E3/UL (ref 150–450)
RBC # BLD AUTO: 4.08 10E6/UL (ref 3.8–5.2)
WBC # BLD AUTO: 5.9 10E3/UL (ref 4–11)

## 2023-06-16 PROCEDURE — 99214 OFFICE O/P EST MOD 30 MIN: CPT | Performed by: FAMILY MEDICINE

## 2023-06-16 PROCEDURE — 83540 ASSAY OF IRON: CPT | Performed by: FAMILY MEDICINE

## 2023-06-16 PROCEDURE — 83550 IRON BINDING TEST: CPT | Performed by: FAMILY MEDICINE

## 2023-06-16 PROCEDURE — 36415 COLL VENOUS BLD VENIPUNCTURE: CPT | Performed by: FAMILY MEDICINE

## 2023-06-16 PROCEDURE — 82728 ASSAY OF FERRITIN: CPT | Performed by: FAMILY MEDICINE

## 2023-06-16 PROCEDURE — 85027 COMPLETE CBC AUTOMATED: CPT | Performed by: FAMILY MEDICINE

## 2023-06-16 RX ORDER — NORETHINDRONE AND ETHINYL ESTRADIOL 0.4-0.035
KIT ORAL
Qty: 112 TABLET | Refills: 2 | Status: SHIPPED | OUTPATIENT
Start: 2023-06-16 | End: 2024-02-21

## 2023-06-16 RX ORDER — BUPROPION HYDROCHLORIDE 100 MG/1
100 TABLET, EXTENDED RELEASE ORAL DAILY
Qty: 90 TABLET | Refills: 1 | Status: SHIPPED | OUTPATIENT
Start: 2023-06-16 | End: 2023-07-03

## 2023-06-16 ASSESSMENT — ENCOUNTER SYMPTOMS
CONSTIPATION: 1
DIARRHEA: 0
HEMATURIA: 0
COUGH: 0
PALPITATIONS: 0
HEMATOCHEZIA: 0
DIZZINESS: 0
MYALGIAS: 1
DYSURIA: 0
HEADACHES: 1
NERVOUS/ANXIOUS: 1
SORE THROAT: 0
PARESTHESIAS: 0
NAUSEA: 0
ABDOMINAL PAIN: 1
ARTHRALGIAS: 1
HEARTBURN: 1
BREAST MASS: 0
FEVER: 0
CHILLS: 0
FREQUENCY: 0
EYE PAIN: 0
WEAKNESS: 0
SHORTNESS OF BREATH: 0
JOINT SWELLING: 1

## 2023-06-16 ASSESSMENT — ANXIETY QUESTIONNAIRES
8. IF YOU CHECKED OFF ANY PROBLEMS, HOW DIFFICULT HAVE THESE MADE IT FOR YOU TO DO YOUR WORK, TAKE CARE OF THINGS AT HOME, OR GET ALONG WITH OTHER PEOPLE?: NOT DIFFICULT AT ALL
7. FEELING AFRAID AS IF SOMETHING AWFUL MIGHT HAPPEN: NOT AT ALL
4. TROUBLE RELAXING: SEVERAL DAYS
3. WORRYING TOO MUCH ABOUT DIFFERENT THINGS: NOT AT ALL
5. BEING SO RESTLESS THAT IT IS HARD TO SIT STILL: NOT AT ALL
IF YOU CHECKED OFF ANY PROBLEMS ON THIS QUESTIONNAIRE, HOW DIFFICULT HAVE THESE PROBLEMS MADE IT FOR YOU TO DO YOUR WORK, TAKE CARE OF THINGS AT HOME, OR GET ALONG WITH OTHER PEOPLE: NOT DIFFICULT AT ALL
GAD7 TOTAL SCORE: 2
2. NOT BEING ABLE TO STOP OR CONTROL WORRYING: NOT AT ALL
GAD7 TOTAL SCORE: 2
GAD7 TOTAL SCORE: 2
6. BECOMING EASILY ANNOYED OR IRRITABLE: SEVERAL DAYS
7. FEELING AFRAID AS IF SOMETHING AWFUL MIGHT HAPPEN: NOT AT ALL
1. FEELING NERVOUS, ANXIOUS, OR ON EDGE: NOT AT ALL

## 2023-06-16 ASSESSMENT — PATIENT HEALTH QUESTIONNAIRE - PHQ9
SUM OF ALL RESPONSES TO PHQ QUESTIONS 1-9: 0
10. IF YOU CHECKED OFF ANY PROBLEMS, HOW DIFFICULT HAVE THESE PROBLEMS MADE IT FOR YOU TO DO YOUR WORK, TAKE CARE OF THINGS AT HOME, OR GET ALONG WITH OTHER PEOPLE: NOT DIFFICULT AT ALL
SUM OF ALL RESPONSES TO PHQ QUESTIONS 1-9: 0

## 2023-06-16 ASSESSMENT — PAIN SCALES - GENERAL: PAINLEVEL: NO PAIN (0)

## 2023-06-16 NOTE — RESULT ENCOUNTER NOTE
Dear Yoselyn,   Your test results are all back -   -All of your labs are normal.  No elevated iron labs.  Let us know if you have any questions.  -Elisabeth Powell, DO

## 2023-06-16 NOTE — PROGRESS NOTES
SUBJECTIVE:   CC: Yoselyn is an 47 year old who presents for 6 month medication follow-up       2023     7:56 AM   Additional Questions   Roomed by miguel pierce   Accompanied by n/a         2023     7:56 AM   Patient Reported Additional Medications   Patient reports taking the following new medications n/a     Healthy Habits:     Frequency of exercise:  4-5 days/week    Duration of exercise:  45-60 minutes    Taking medications regularly:  Yes    Medication side effects:  None    PHQ-2 Total Score: 0    Additional concerns today:  No    YANIRA -   Refilled lorazepam   Father 78- had a fall and broke pelvis  Taking sertraline 75mg daily  Taking wellbutrin 100mg SR once daily   Last two months extra stressful  Taking trazodone 50mg at bedtime  Tired and falls asleep but often wakes multiple times  Using the lorazepam at bedtime approx once week     Has full bottle of lorazepam -     Right hip pain  On and off since young age   Diagnosed with bursitis -   Has tried     -------------------------------------        Social History     Tobacco Use     Smoking status: Never     Smokeless tobacco: Never   Vaping Use     Vaping status: Not on file   Substance Use Topics     Alcohol use: Yes     Comment: socially             2023     2:35 PM   Alcohol Use   Prescreen: >3 drinks/day or >7 drinks/week? No     Reviewed orders with patient.  Reviewed health maintenance and updated orders accordingly - Yes  Patient Active Problem List   Diagnosis     Dysplasia of cervix     CARDIOVASCULAR SCREENING; LDL GOAL LESS THAN 160     Family history of malignant neoplasm of breast     YANIRA (generalized anxiety disorder)     Family history of hemochromatosis     Hereditary hemochromatosis (H)     Endometrium, polyp     Cyst of left ovary     Basal cell carcinoma (BCC) of back     Past Surgical History:   Procedure Laterality Date     ANKLE SURGERY  08/10/2011     APPENDECTOMY        SECTION N/A 2015    Procedure:   SECTION;  Surgeon: Emma Kendall MD;  Location: [facility]     COLONOSCOPY N/A 2022    Procedure: COLONOSCOPY;  Surgeon: Anushka Vicente MD;  Location: [facility] OR     DILATION AND CURETTAGE, OPERATIVE HYSTEROSCOPY WITH MORCELLATOR, COMBINED N/A 2021    Procedure: HYSTEROSCOPY, WITH DILATION AND CURETTAGE OF UTERUS USING MORCELLATOR;  Surgeon: Marybel Hutchison MD;  Location: [facility] OR     ELECTROCONVULSIVE THERAPY  -    [facility] Dr Carpenter     ORTHOPEDIC SURGERY         Social History     Tobacco Use     Smoking status: Never     Smokeless tobacco: Never   Vaping Use     Vaping status: Not on file   Substance Use Topics     Alcohol use: Yes     Comment: socially     Family History   Problem Relation Age of Onset     C.A.D. Mother      Hypertension Mother      Breast Cancer Mother      Other - See Comments Father         hereditary hemochromotosis     C.A.D. Maternal Grandfather      Hypertension Maternal Grandfather      Alcohol/Drug Maternal Grandfather      Depression Paternal Grandmother      C.A.D. Paternal Grandfather      Colon Cancer Maternal Aunt            Breast Cancer Screening:    FHS-7:       2021     9:18 AM 2022     8:38 AM 2022     4:18 PM 2023     7:45 AM   Breast CA Risk Assessment (FHS-7)   Did any of your first-degree relatives have breast or ovarian cancer? Yes Yes Yes Yes   Did any of your relatives have bilateral breast cancer? No No No No   Did any man in your family have breast cancer? No No No No   Did any woman in your family have breast and ovarian cancer? No No No Yes   Did any woman in your family have breast cancer before age 50 y? Yes Yes Unknown Yes   Do you have 2 or more relatives with breast and/or ovarian cancer? No No No Unknown   Do you have 2 or more relatives with breast and/or bowel cancer? No No No Unknown                           Reviewed and updated as needed this visit by Provider                     Review of Systems    Constitutional: Negative for chills and fever.   HENT: Negative for congestion, ear pain, hearing loss and sore throat.    Eyes: Negative for pain and visual disturbance.   Respiratory: Negative for cough and shortness of breath.    Cardiovascular: Negative for chest pain, palpitations and peripheral edema.   Gastrointestinal: Positive for abdominal pain, constipation and heartburn. Negative for diarrhea, hematochezia and nausea.   Breasts:  Negative for tenderness, breast mass and discharge.   Genitourinary: Negative for dysuria, frequency, genital sores, hematuria, pelvic pain, urgency, vaginal bleeding and vaginal discharge.   Musculoskeletal: Positive for arthralgias, joint swelling and myalgias.   Skin: Negative for rash.   Neurological: Positive for headaches. Negative for dizziness, weakness and paresthesias.   Psychiatric/Behavioral: Negative for mood changes. The patient is nervous/anxious.           OBJECTIVE:   There were no vitals taken for this visit.  Physical Exam  GENERAL: healthy, alert and no distress  RESP: lungs clear to auscultation - no rales, rhonchi or wheezes  CV: regular rate and rhythm, normal S1 S2, no S3 or S4, no murmur, click or rub, no peripheral edema and peripheral pulses strong    Diagnostic Test Results:  Labs reviewed in Epic    ASSESSMENT/PLAN:   (M25.551) Hip pain, right  (primary encounter diagnosis)  Comment: right hip pain -   Will refer to PT for evaluation and treatment   Plan: Physical Therapy Referral             (F41.1) YANIRA (generalized anxiety disorder)  Comment: worsening anxiety past 2 months - using sertraline and wellbutrin  Taking lorazepam approx one night per week which seems to help her sleep - multiple wakenings  Also taking trazodone one at bedtime  Will hold off on adjusting meds but if symptoms persist consider increasing sertraline from 75mg up to 100mg   Pt will send me [employer] message   Pt will call or RTC if symptoms worsen or do not improve.   Plan:  sertraline (ZOLOFT) 50 MG tablet, buPROPion         (WELLBUTRIN SR) 100 MG 12 hr tablet             (Z01.419) Encounter for gynecological examination without abnormal finding  Comment:    Plan: VYFEMLA 0.4-35 MG-MCG tablet             (E83.110) Hereditary hemochromatosis (H)  Comment: labs pending - pt fasting today   Plan: Ferritin, CBC with platelets, Iron and iron         binding capacity                     COUNSELING:          She reports that she has never smoked. She has never used smokeless tobacco.      Elisabeth Powell, [facility] [place]  Answers for HPI/ROS submitted by the patient on 6/16/2023  If you checked off any problems, how difficult have these problems made it for you to do your work, take care of things at home, or get along with other people?: Not difficult at all  PHQ9 TOTAL SCORE: 0  YANIRA 7 TOTAL SCORE: 2

## 2023-07-17 ENCOUNTER — THERAPY VISIT (OUTPATIENT)
Dept: PHYSICAL THERAPY | Facility: CLINIC | Age: 48
End: 2023-07-17
Attending: FAMILY MEDICINE
Payer: COMMERCIAL

## 2023-07-17 DIAGNOSIS — M25.551 HIP PAIN, RIGHT: ICD-10-CM

## 2023-07-17 PROCEDURE — 97161 PT EVAL LOW COMPLEX 20 MIN: CPT | Mod: GP | Performed by: PHYSICAL THERAPIST

## 2023-07-17 PROCEDURE — 97110 THERAPEUTIC EXERCISES: CPT | Mod: GP | Performed by: PHYSICAL THERAPIST

## 2023-07-17 ASSESSMENT — ACTIVITIES OF DAILY LIVING (ADL)
GETTING_INTO_AND_OUT_OF_AN_AVERAGE_CAR: NO DIFFICULTY AT ALL
WALKING_INITIALLY: SLIGHT DIFFICULTY
GETTING_INTO_AND_OUT_OF_A_BATHTUB: NO DIFFICULTY AT ALL
HEAVY_WORK: SLIGHT DIFFICULTY
ROLLING_OVER_IN_BED: SLIGHT DIFFICULTY
STEPPING_UP_AND_DOWN_CURBS: SLIGHT DIFFICULTY
WALKING_DOWN_STEEP_HILLS: SLIGHT DIFFICULTY
HOS_ADL_SCORE(%): 82.35
WALKING_UP_STEEP_HILLS: SLIGHT DIFFICULTY
TWISTING/PIVOTING_ON_INVOLVED_LEG: MODERATE DIFFICULTY
HOS_ADL_ITEM_SCORE_TOTAL: 56
HOS_ADL_COUNT: 17
STANDING_FOR_15_MINUTES: SLIGHT DIFFICULTY
LIGHT_TO_MODERATE_WORK: SLIGHT DIFFICULTY
GOING_UP_1_FLIGHT_OF_STAIRS: NO DIFFICULTY AT ALL
PUTTING_ON_SOCKS_AND_SHOES: NO DIFFICULTY AT ALL
GOING_DOWN_1_FLIGHT_OF_STAIRS: NO DIFFICULTY AT ALL
RECREATIONAL_ACTIVITIES: SLIGHT DIFFICULTY
WALKING_15_MINUTES_OR_GREATER: NO DIFFICULTY AT ALL
HOW_WOULD_YOU_RATE_YOUR_CURRENT_LEVEL_OF_FUNCTION_DURING_YOUR_USUAL_ACTIVITIES_OF_DAILY_LIVING_FROM_0_TO_100_WITH_100_BEING_YOUR_LEVEL_OF_FUNCTION_PRIOR_TO_YOUR_HIP_PROBLEM_AND_0_BEING_THE_INABILITY_TO_PERFORM_ANY_OF_YOUR_USUAL_DAILY_ACTIVITIES?: 100
WALKING_APPROXIMATELY_10_MINUTES: NO DIFFICULTY AT ALL
HOS_ADL_HIGHEST_POTENTIAL_SCORE: 68
SITTING_FOR_15_MINUTES: NO DIFFICULTY AT ALL
DEEP_SQUATTING: SLIGHT DIFFICULTY

## 2023-07-17 NOTE — PROGRESS NOTES
PHYSICAL THERAPY EVALUATION  Type of Visit: Evaluation    See electronic medical record for Abuse and Falls Screening details.    Subjective      Presenting condition or subjective complaint: right hip chronically tight/painful that began as a teenager, had 2-3 cortisone injections then  Date of onset:  (off and on since high school)    Relevant medical history:     Dates & types of surgery:  in  and ankle surgery in     Prior diagnostic imaging/testing results:       Prior therapy history for the same diagnosis, illness or injury: Yes corstisone shot    Prior Level of Function   Transfers: Independent  Ambulation: Independent  ADL: Independent  IADL:     Living Environment  Social support: With family members   Type of home: House   Stairs to enter the home: Yes       Ramp: No   Stairs inside the home: Yes       Help at home: None   Equipment owned:       Employment: Yes social work  Hobbies/Interests: walking, biking, gardening, reading, (lives near [place])    Patient goals for therapy: less tight/pain    Pain assessment: See objective evaluation for additional pain details     Objective   HIP EVALUATION  PAIN: Pain is Exacerbated By: sleeping at night and in the morning and on the Peloton  Pain is Relieved By: stretch  INTEGUMENTARY (edema, incisions): WNL  POSTURE: Standing Posture: Lordosis increased  GAIT:   Weightbearing Status: WBAT  Assistive Device(s): None  Gait Deviations: decreased hip extension, heel whip  BALANCE/PROPRIOCEPTION: WNL  WEIGHTBEARING ALIGNMENT:   NON-WEIGHTBEARING ALIGNMENT:    ROM:   (Degrees) Left AROM Left PROM  Right AROM Right PROM   Hip Flexion  No pain full  Groin pain, slight limitation   Hip Extension  10     Hip Abduction       Hip Adduction       Hip Internal Rotation       Hip External Rotation  50  40 with end range soreness   Knee Flexion       Knee Extension       Lumbar Side glide     Lumbar Flexion Pulls right posterior thigh   Lumbar extension No pain    Pain:   End feel:       PELVIC/SI SCREEN:   STRENGTH:   Pain: - none + mild ++ moderate +++ severe  Strength Scale: 0-5/5 Left Right   Hip Flexion 5 5   Hip Extension 5 5-   Hip Abduction 5- 4+                              LE FLEXIBILITY: Decreased hip flexors R, Decreased quadriceps R  SPECIAL TESTS:    Left Right   ANTONI Negative  Positive   FADIR/Labrum/TRACY Negative  Positive                                      Jus Negative  Positive          FUNCTIONAL TESTS: Double Leg Squat: Anterior knee translation, Improper use of glutes/hips and Impaired weight distribution  PALPATION: pain right greater trochanter  JOINT MOBILITY: assess next    Assessment & Plan   CLINICAL IMPRESSIONS   Medical Diagnosis: right hip pain    Treatment Diagnosis: right hip pain   Impression/Assessment: Patient is a 47 year old female with right hip complaints.  The following significant findings have been identified: Pain, Decreased ROM/flexibility, Decreased joint mobility, Decreased strength and Impaired muscle performance. These impairments interfere with their ability to perform self care tasks, work tasks and recreational activities as compared to previous level of function.     Clinical Decision Making (Complexity):   Clinical Presentation: Stable/Uncomplicated  Clinical Presentation Rationale: based on medical and personal factors listed in PT evaluation  Clinical Decision Making (Complexity): Low complexity    PLAN OF CARE  Treatment Interventions:  Interventions: Manual Therapy, Neuromuscular Re-education, Therapeutic Activity, Therapeutic Exercise    Long Term Goals     PT Goal 1  Goal Identifier: standing  Goal Description: patient able to stand for 30 min without pain  Rationale: to maximize safety and independence with performance of ADLs and functional tasks  Goal Progress: pain in right hip with standing  Target Date: 08/21/23  PT Goal 2  Goal Identifier: pain at night  Goal Description: patient able to sleep without  waking due to pain  Rationale: to maximize safety and independence with performance of ADLs and functional tasks  Target Date: 10/14/23      Frequency of Treatment: 2 times a month  Duration of Treatment: 3 month    Recommended Referrals to Other Professionals:   Education Assessment:   Learner/Method: Patient;Listening;Reading;Demonstration;No Barriers to Learning;Pictures/Video    Risks and benefits of evaluation/treatment have been explained.   Patient/Family/caregiver agrees with Plan of Care.     Evaluation Time:     PT Eval, Low Complexity Minutes (96734): 15      Signing Clinician: Anne Grullon PT

## 2023-07-25 ENCOUNTER — THERAPY VISIT (OUTPATIENT)
Dept: PHYSICAL THERAPY | Facility: CLINIC | Age: 48
End: 2023-07-25
Payer: COMMERCIAL

## 2023-07-25 DIAGNOSIS — M25.551 HIP PAIN, RIGHT: Primary | ICD-10-CM

## 2023-07-25 PROCEDURE — 97110 THERAPEUTIC EXERCISES: CPT | Mod: GP | Performed by: PHYSICAL THERAPIST

## 2023-07-25 PROCEDURE — 97140 MANUAL THERAPY 1/> REGIONS: CPT | Mod: GP | Performed by: PHYSICAL THERAPIST

## 2023-08-02 ENCOUNTER — THERAPY VISIT (OUTPATIENT)
Dept: PHYSICAL THERAPY | Facility: CLINIC | Age: 48
End: 2023-08-02
Attending: FAMILY MEDICINE
Payer: COMMERCIAL

## 2023-08-02 DIAGNOSIS — M25.551 HIP PAIN, RIGHT: Primary | ICD-10-CM

## 2023-08-02 PROCEDURE — 97110 THERAPEUTIC EXERCISES: CPT | Mod: GP | Performed by: PHYSICAL THERAPIST

## 2023-08-02 PROCEDURE — 97140 MANUAL THERAPY 1/> REGIONS: CPT | Mod: GP | Performed by: PHYSICAL THERAPIST

## 2023-08-08 ENCOUNTER — THERAPY VISIT (OUTPATIENT)
Dept: PHYSICAL THERAPY | Facility: CLINIC | Age: 48
End: 2023-08-08
Payer: COMMERCIAL

## 2023-08-08 DIAGNOSIS — M25.551 HIP PAIN, RIGHT: Primary | ICD-10-CM

## 2023-08-08 PROCEDURE — 97140 MANUAL THERAPY 1/> REGIONS: CPT | Mod: GP | Performed by: PHYSICAL THERAPIST

## 2023-08-08 PROCEDURE — 97110 THERAPEUTIC EXERCISES: CPT | Mod: GP | Performed by: PHYSICAL THERAPIST

## 2023-08-11 DIAGNOSIS — F41.1 GAD (GENERALIZED ANXIETY DISORDER): ICD-10-CM

## 2023-08-11 NOTE — TELEPHONE ENCOUNTER
"Requested Prescriptions   Pending Prescriptions Disp Refills    sertraline (ZOLOFT) 50 MG tablet [Pharmacy Med Name: SERTRALINE 50MG TABLETS] 135 tablet 1     Sig: TAKE 1 AND 1/2 TABS(75MG) BY MOUTH DAILY       SSRIs Protocol Passed - 8/11/2023  8:49 AM        Passed - Recent (12 mo) or future (30 days) visit within the authorizing provider's specialty     Patient has had an office visit with the authorizing provider or a provider within the authorizing providers department within the previous 12 mos or has a future within next 30 days. See \"Patient Info\" tab in inbasket, or \"Choose Columns\" in Meds & Orders section of the refill encounter.              Passed - Medication is active on med list        Passed - Patient is age 18 or older        Passed - No active pregnancy on record        Passed - No positive pregnancy test in last 12 months           3 month supply ordered on 6/16/23, with 1 refill. Pt should have refill on file.    Rakan Roe RN   [facility]    "

## 2023-08-28 ENCOUNTER — THERAPY VISIT (OUTPATIENT)
Dept: PHYSICAL THERAPY | Facility: CLINIC | Age: 48
End: 2023-08-28
Payer: COMMERCIAL

## 2023-08-28 DIAGNOSIS — M25.551 HIP PAIN, RIGHT: Primary | ICD-10-CM

## 2023-08-28 PROCEDURE — 97110 THERAPEUTIC EXERCISES: CPT | Mod: GP | Performed by: PHYSICAL THERAPIST

## 2023-08-28 PROCEDURE — 97140 MANUAL THERAPY 1/> REGIONS: CPT | Mod: GP | Performed by: PHYSICAL THERAPIST

## 2023-08-28 ASSESSMENT — ACTIVITIES OF DAILY LIVING (ADL)
WALKING_DOWN_STEEP_HILLS: SLIGHT DIFFICULTY
HOS_ADL_SCORE(%): 86.76
STEPPING_UP_AND_DOWN_CURBS: NO DIFFICULTY AT ALL
PUTTING_ON_SOCKS_AND_SHOES: NO DIFFICULTY AT ALL
TWISTING/PIVOTING_ON_INVOLVED_LEG: SLIGHT DIFFICULTY
GETTING_INTO_AND_OUT_OF_A_BATHTUB: NO DIFFICULTY AT ALL
HOS_ADL_HIGHEST_POTENTIAL_SCORE: 68
STANDING_FOR_15_MINUTES: SLIGHT DIFFICULTY
ROLLING_OVER_IN_BED: SLIGHT DIFFICULTY
HEAVY_WORK: SLIGHT DIFFICULTY
GOING_UP_1_FLIGHT_OF_STAIRS: NO DIFFICULTY AT ALL
RECREATIONAL_ACTIVITIES: SLIGHT DIFFICULTY
WALKING_INITIALLY: SLIGHT DIFFICULTY
WALKING_UP_STEEP_HILLS: SLIGHT DIFFICULTY
HOW_WOULD_YOU_RATE_YOUR_CURRENT_LEVEL_OF_FUNCTION_DURING_YOUR_USUAL_ACTIVITIES_OF_DAILY_LIVING_FROM_0_TO_100_WITH_100_BEING_YOUR_LEVEL_OF_FUNCTION_PRIOR_TO_YOUR_HIP_PROBLEM_AND_0_BEING_THE_INABILITY_TO_PERFORM_ANY_OF_YOUR_USUAL_DAILY_ACTIVITIES?: 100
GETTING_INTO_AND_OUT_OF_AN_AVERAGE_CAR: NO DIFFICULTY AT ALL
HOS_ADL_ITEM_SCORE_TOTAL: 59
HOS_ADL_COUNT: 17
WALKING_APPROXIMATELY_10_MINUTES: NO DIFFICULTY AT ALL
GOING_DOWN_1_FLIGHT_OF_STAIRS: NO DIFFICULTY AT ALL
WALKING_15_MINUTES_OR_GREATER: NO DIFFICULTY AT ALL
SITTING_FOR_15_MINUTES: NO DIFFICULTY AT ALL
DEEP_SQUATTING: NO DIFFICULTY AT ALL
LIGHT_TO_MODERATE_WORK: SLIGHT DIFFICULTY

## 2023-08-28 NOTE — PROGRESS NOTES
DISCHARGE  Reason for Discharge: Patient to continue on her own and may see MD for x-ray since she has had similar pain since high school     Equipment Issued: theraband    Discharge Plan: Patient to continue home program.   08/28/23 0500   Appointment Info   Signing clinician's name / credentials Anne NinaATC   Total/Authorized Visits E+T(6)   Visits Used 5   Medical Diagnosis right hip pain   PT Tx Diagnosis right hip pain   Other pertinent information , has an 9 yo   Progress Note/Certification   Onset of illness/injury or Date of Surgery   (off and on since high school)   Therapy Frequency 2 times a month   Predicted Duration 3 month   Progress Note Completed Date 10/14/23   GOALS   PT Goals 2   PT Goal 1   Goal Identifier standing   Goal Description patient able to stand for 30 min without pain   Rationale to maximize safety and independence with performance of ADLs and functional tasks   Goal Progress still has some pain in the hip with standing but has noticed improvement   Target Date 09/21/23   PT Goal 2   Goal Identifier pain at night   Goal Description patient able to sleep without waking due to pain   Rationale to maximize safety and independence with performance of ADLs and functional tasks   Goal Progress Still having pain at night that wakes her up and is using pillows between her knees   Target Date 10/14/23   Subjective Report   Subjective Report Some hip pain with driving. Mobility has improved.  Stretches helping   Objective Measures   Objective Measures Objective Measure 2;Objective Measure 1;Objective Measure 3   Objective Measure 1   Objective Measure hip ROM   Details decreased hip extension on the right   Objective Measure 2   Objective Measure bending   Details still feeling more stretch on the right but feels mobility has improved.   Objective Measure 3   Objective Measure squatting   Details able to squat pain free   Treatment Interventions (PT)   Interventions  Therapeutic Procedure/Exercise;Therapeutic Activity;Manual Therapy   Therapeutic Procedure/Exercise   Therapeutic Procedures: strength, endurance, ROM, flexibility minutes (47638) 15   PTRx Ther Proc 6 Sidelying Hip Abduction at Wall in Neutral and 45 degrees   PTRx Ther Proc 6 - Details warm up 10 x before workout    PTRx Ther Proc 7 Bridging #1   PTRx Ther Proc 7 - Details 3 reps for 5 sec   PTRx Ther Proc 8 Bridging #2 A Weight Shift   PTRx Ther Proc 8 - Details 10 reps   PTRx Ther Proc 9 Supine Hamstring Stretch   PTRx Ther Proc 9 - Details with a yoga strap stretch the hamstrings drop leg out to the side and then across   PTRx Ther Proc 10 Reverse Clamshell   PTRx Ther Proc 10 - Details 3-5x each side required cues for correct technique    PTRx Ther Proc 11 Hip Adduction Straight Leg Raise   PTRx Ther Proc 11 - Details HEP   Skilled Intervention discussed progression of strengthening   Therapeutic Activity   PTRx Ther Act 1 Ball Massage   PTRx Ther Act 1 - Details using massager at home   PTRx Ther Act 2 Education Sheet General   PTRx Ther Act 2 - Details Try peloton videos for side stepping and squats.    Therapeutic Activities: dynamic activities to improve functional performance minutes (78931) 5   Neuromuscular Re-education   PTRx Neuro Re-ed 1 Clamshell Feet together   PTRx Neuro Re-ed 1 - Details required cues for correct technique    Manual Therapy   Manual Therapy: Mobilization, MFR, MLD, friction massage minutes (18163) 15   Manual Therapy Manual Therapy 2   Manual Therapy 1 right hip/glute   Manual Therapy 1 - Details TPR, MFR to right hip mm, use tennis ball at home   Patient Response/Progress improved mobility, decreased pain, improved ability to perform clamshell, no pain with gas pedal stretch post manual therapy   Education   Learner/Method Patient;Listening;Reading;Demonstration;No Barriers to Learning;Pictures/Video   Education Comments Discussion on weight bearing strengthening exercise to  maintain bone integrity through menopause   Plan   Home program see ptrx   Updates to plan of care patient to continue on her own and progress, may discuss getting x ray of hip since patient has had pain since high school   Total Session Time   Timed Code Treatment Minutes 35   Total Treatment Time (sum of timed and untimed services) 35       Referring Provider:  Elisabeth Powell

## 2023-09-16 NOTE — TELEPHONE ENCOUNTER
"TRAZODONE 50MG TABLETS  Last Written Prescription Date:  12/18/2019  Last Fill Quantity: 90,  # refills: 1   Last office visit: 12/18/2019 with prescribing provider:  KRISTA   Future Office Visit:  Nothing at this time scheduled.    Requested Prescriptions   Pending Prescriptions Disp Refills     traZODone (DESYREL) 50 MG tablet [Pharmacy Med Name: TRAZODONE 50MG TABLETS] 60 tablet      Sig: TAKE 2 TABLETS BY MOUTH AT BEDTIME       Serotonin Modulators Passed - 12/26/2019  5:04 AM        Passed - Recent (12 mo) or future (30 days) visit within the authorizing provider's specialty     Patient has had an office visit with the authorizing provider or a provider within the authorizing providers department within the previous 12 mos or has a future within next 30 days. See \"Patient Info\" tab in inbasket, or \"Choose Columns\" in Meds & Orders section of the refill encounter.              Passed - Medication is active on med list        Passed - Patient is age 18 or older        Passed - No active pregnancy on record        Passed - No positive pregnancy test in past 12 months        " No

## 2023-10-20 ENCOUNTER — VIRTUAL VISIT (OUTPATIENT)
Dept: FAMILY MEDICINE | Facility: CLINIC | Age: 48
End: 2023-10-20
Payer: COMMERCIAL

## 2023-10-20 DIAGNOSIS — N92.6 IRREGULAR BLEEDING: Primary | ICD-10-CM

## 2023-10-20 DIAGNOSIS — U07.1 INFECTION DUE TO 2019 NOVEL CORONAVIRUS: ICD-10-CM

## 2023-10-20 DIAGNOSIS — R14.0 ABDOMINAL BLOATING: ICD-10-CM

## 2023-10-20 DIAGNOSIS — N89.8 VAGINAL DISCHARGE: ICD-10-CM

## 2023-10-20 PROCEDURE — 99214 OFFICE O/P EST MOD 30 MIN: CPT | Mod: VID | Performed by: FAMILY MEDICINE

## 2023-10-20 NOTE — PROGRESS NOTES
Yoselyn is a 48 year old who is being evaluated via a billable video visit.      How would you like to obtain your AVS? MyChart  If the video visit is dropped, the invitation should be resent by: Text to cell phone: [phone]  Will anyone else be joining your video visit? No          Assessment & Plan     Irregular bleeding  Irregular light spotting with abdominal cramping -   On OCP but this is a change over the past year.  Plan to check labs to look for possible cause such has hypothyroid, FSH or celiac  Will check pelvic ultrasound to r/o ovarian mass as cause  Pt will call or RTC if symptoms worsen or do not improve.   - Follicle stimulating hormone; Future  - Wet prep - lab collect; Future  - CBC with platelets; Future  - Comprehensive metabolic panel (BMP + Alb, Alk Phos, ALT, AST, Total. Bili, TP); Future  - TSH with free T4 reflex; Future  - US Pelvic Complete with Transvaginal; Future  - IgA [LAB73]; Future  - Deamidated Giladin Peptide Geno IgA IgG [[id number]]; Future  - Tissue transglutaminase geno IgA and IgG [[id number]]; Future  - Endomysial Antibody IgA by IFA [[id number]]; Future    Abdominal bloating     - Follicle stimulating hormone; Future  - Wet prep - lab collect; Future  - CBC with platelets; Future  - Comprehensive metabolic panel (BMP + Alb, Alk Phos, ALT, AST, Total. Bili, TP); Future  - TSH with free T4 reflex; Future  - US Pelvic Complete with Transvaginal; Future  - IgA [LAB73]; Future  - Deamidated Giladin Peptide Geno IgA IgG [[id number]]; Future  - Tissue transglutaminase geno IgA and IgG [[id number]]; Future  - Endomysial Antibody IgA by IFA [[id number]]; Future    Vaginal discharge     - Wet prep - lab collect; Future    Infection due to 2019 novel coronavirus  Current infection -   Tested positive 2 days ago  Breathing well - no current acute issues  Discussed home self cares and isolation guidelines  Pt will call or RTC if symptoms worsen or do not improve.                  DO MAY Pino  "[facility]    Darlin Topete is a 48 year old, presenting for the following health issues:  Bloated      10/20/2023     1:55 PM   Additional Questions   Roomed by miguel   Accompanied by n/.a         10/20/2023     1:55 PM   Patient Reported Additional Medications   Patient reports taking the following new medications n/a       History of Present Illness       Reason for visit:  Fatigue, weight gain, Covid  Symptom onset:  More than a month  Symptom intensity:  Moderate  Symptom progression:  Staying the same  Had these symptoms before:  Yes  Has tried/received treatment for these symptoms:  Yes  Previous treatment was successful:  Yes  Prior treatment description:  BCP    She eats 2-3 servings of fruits and vegetables daily.She consumes 0 sweetened beverage(s) daily.She exercises with enough effort to increase her heart rate 30 to 60 minutes per day.  She exercises with enough effort to increase her heart rate 5 days per week.   She is taking medications regularly.       COVID -   Tested positive on 10/18/23  Feels awful -     Last few months - taking OCP daily that she has been taking for long time  More cramping and discharge  Has not had menses in 15-18 months   Did pause the OCP to see if any bleeding - stopped 4-5 days and up to 7 days between cycles   Has slight spotting - \"colored discharge\" at other times  Abdominal cramping and fatigue   Does better with tea she makes to have regular BM                    Review of Systems   Constitutional, HEENT, cardiovascular, pulmonary, gi and gu systems are negative, except as otherwise noted.      Objective    Vitals - Patient Reported  Systolic (Patient Reported):  (n/a)  Diastolic (Patient Reported):  (n/a)  Weight (Patient Reported):  (n/a)  Height (Patient Reported):  (n/a)  SpO2 (Patient Reported):  (n/a)  Temperature (Patient Reported):  (n/a)  Pulse (Patient Reported):  (n/a)      Vitals:  No vitals were obtained today due to virtual " visit.    Physical Exam   GENERAL: alert and no distress  RESP: No audible wheeze, cough, or visible cyanosis.  No visible retractions or increased work of breathing.    SKIN: Visible skin clear. No significant rash, abnormal pigmentation or lesions.  NEURO: Cranial nerves grossly intact.  Mentation and speech appropriate for age.  PSYCH: Mentation appears normal, affect normal/bright, judgement and insight intact, normal speech and appearance well-groomed.                Video-Visit Details    Type of service:  Video Visit     Originating Location (pt. Location): Home    Distant Location (provider location):  On-site  Platform used for Video Visit: [employer]

## 2023-10-23 ENCOUNTER — TELEPHONE (OUTPATIENT)
Dept: FAMILY MEDICINE | Facility: CLINIC | Age: 48
End: 2023-10-23
Payer: COMMERCIAL

## 2023-10-23 NOTE — TELEPHONE ENCOUNTER
I recommend that she keep working on pushing fluids and she may continue ibuprofen and Tylenol.  She may need to take these regularly over the next few days as symptoms will hopefully start improving soon.  If she develops any worrisome symptoms, like nausea or vomiting, difficulty with balance or cognition she should seek medical attention right away. -DE

## 2023-10-23 NOTE — TELEPHONE ENCOUNTER
Left message for patient to call [facility] back  When patient calls back please transfer to an RN  Izzy VALDOVINOS RN

## 2023-10-23 NOTE — TELEPHONE ENCOUNTER
[place]  Dr Powell    Pt was positive for COVID at V visit on 10/20/23, she has been doing the suggested home care, she is overall improved but has had the headache symptom ongoing since last week, rated as worse 8/10, stated she is doing pretty well with getting fluids in, sleeping well, eating well  The headache is on the top of her to the back to base of her skull  Using both tylenol and ibuprofen, today just ibuprofen   Normal temp 97.2 but has been running higher than her norm at highest 98.6 oral    Please advise, best call back number is     Tari Mancilla RN   [facility]

## 2023-10-30 ENCOUNTER — MYC MEDICAL ADVICE (OUTPATIENT)
Dept: FAMILY MEDICINE | Facility: CLINIC | Age: 48
End: 2023-10-30
Payer: COMMERCIAL

## 2023-11-03 ENCOUNTER — HOSPITAL ENCOUNTER (OUTPATIENT)
Dept: ULTRASOUND IMAGING | Facility: CLINIC | Age: 48
Discharge: HOME | End: 2023-11-03
Attending: FAMILY MEDICINE | Admitting: FAMILY MEDICINE
Payer: COMMERCIAL

## 2023-11-03 DIAGNOSIS — N92.6 IRREGULAR BLEEDING: ICD-10-CM

## 2023-11-03 DIAGNOSIS — R14.0 ABDOMINAL BLOATING: ICD-10-CM

## 2023-11-03 PROCEDURE — 76856 US EXAM PELVIC COMPLETE: CPT

## 2023-11-03 NOTE — RESULT ENCOUNTER NOTE
Dear Yoselyn,   Your test results are all back -   Pelvic ultrasound shows a very small <1cm fibroid.  I am doubtful this is the cause of your symptoms.  Ovaries look normal as well.   Let us know if you have any questions.  -Elisabeth Powell, DO

## 2023-11-06 ENCOUNTER — LAB (OUTPATIENT)
Dept: LAB | Facility: CLINIC | Age: 48
End: 2023-11-06
Payer: COMMERCIAL

## 2023-11-06 DIAGNOSIS — N92.6 IRREGULAR BLEEDING: ICD-10-CM

## 2023-11-06 DIAGNOSIS — N89.8 VAGINAL DISCHARGE: ICD-10-CM

## 2023-11-06 DIAGNOSIS — R14.0 ABDOMINAL BLOATING: ICD-10-CM

## 2023-11-06 LAB
ALBUMIN SERPL BCG-MCNC: 4.3 G/DL (ref 3.5–5.2)
ALP SERPL-CCNC: 72 U/L (ref 35–104)
ALT SERPL W P-5'-P-CCNC: 20 U/L (ref 0–50)
ANION GAP SERPL CALCULATED.3IONS-SCNC: 11 MMOL/L (ref 7–15)
AST SERPL W P-5'-P-CCNC: 26 U/L (ref 0–45)
BILIRUB SERPL-MCNC: 0.2 MG/DL
BUN SERPL-MCNC: 10.6 MG/DL (ref 6–20)
CALCIUM SERPL-MCNC: 9.5 MG/DL (ref 8.6–10)
CHLORIDE SERPL-SCNC: 101 MMOL/L (ref 98–107)
CLUE CELLS: PRESENT
CREAT SERPL-MCNC: 0.79 MG/DL (ref 0.51–0.95)
DEPRECATED HCO3 PLAS-SCNC: 24 MMOL/L (ref 22–29)
EGFRCR SERPLBLD CKD-EPI 2021: >90 ML/MIN/1.73M2
ERYTHROCYTE [DISTWIDTH] IN BLOOD BY AUTOMATED COUNT: 12 % (ref 10–15)
FSH SERPL IRP2-ACNC: 62.3 MIU/ML
GLUCOSE SERPL-MCNC: 108 MG/DL (ref 70–99)
HCT VFR BLD AUTO: 41.9 % (ref 35–47)
HGB BLD-MCNC: 13.7 G/DL (ref 11.7–15.7)
MCH RBC QN AUTO: 31.1 PG (ref 26.5–33)
MCHC RBC AUTO-ENTMCNC: 32.7 G/DL (ref 31.5–36.5)
MCV RBC AUTO: 95 FL (ref 78–100)
PLATELET # BLD AUTO: 292 10E3/UL (ref 150–450)
POTASSIUM SERPL-SCNC: 4.2 MMOL/L (ref 3.4–5.3)
PROT SERPL-MCNC: 7.6 G/DL (ref 6.4–8.3)
RBC # BLD AUTO: 4.41 10E6/UL (ref 3.8–5.2)
SODIUM SERPL-SCNC: 136 MMOL/L (ref 135–145)
TRICHOMONAS, WET PREP: ABNORMAL
TSH SERPL DL<=0.005 MIU/L-ACNC: 0.66 UIU/ML (ref 0.3–4.2)
WBC # BLD AUTO: 7.4 10E3/UL (ref 4–11)
WBC'S/HIGH POWER FIELD, WET PREP: ABNORMAL
YEAST, WET PREP: ABNORMAL

## 2023-11-06 PROCEDURE — 36415 COLL VENOUS BLD VENIPUNCTURE: CPT

## 2023-11-06 PROCEDURE — 83001 ASSAY OF GONADOTROPIN (FSH): CPT

## 2023-11-06 PROCEDURE — 82784 ASSAY IGA/IGD/IGG/IGM EACH: CPT

## 2023-11-06 PROCEDURE — 86364 TISS TRNSGLTMNASE EA IG CLAS: CPT

## 2023-11-06 PROCEDURE — 84443 ASSAY THYROID STIM HORMONE: CPT

## 2023-11-06 PROCEDURE — 87210 SMEAR WET MOUNT SALINE/INK: CPT

## 2023-11-06 PROCEDURE — 86231 EMA EACH IG CLASS: CPT | Mod: 90

## 2023-11-06 PROCEDURE — 86258 DGP ANTIBODY EACH IG CLASS: CPT

## 2023-11-06 PROCEDURE — 99000 SPECIMEN HANDLING OFFICE-LAB: CPT

## 2023-11-06 PROCEDURE — 85027 COMPLETE CBC AUTOMATED: CPT

## 2023-11-06 PROCEDURE — 80053 COMPREHEN METABOLIC PANEL: CPT

## 2023-11-07 LAB — IGA SERPL-MCNC: 310 MG/DL (ref 84–499)

## 2023-11-08 ENCOUNTER — MYC MEDICAL ADVICE (OUTPATIENT)
Dept: FAMILY MEDICINE | Facility: CLINIC | Age: 48
End: 2023-11-08
Payer: COMMERCIAL

## 2023-11-08 DIAGNOSIS — N76.0 BV (BACTERIAL VAGINOSIS): Primary | ICD-10-CM

## 2023-11-08 DIAGNOSIS — B96.89 BV (BACTERIAL VAGINOSIS): Primary | ICD-10-CM

## 2023-11-08 LAB
ENDOMYSIUM IGA TITR SER IF: NORMAL {TITER}
GLIADIN IGA SER-ACNC: 1.1 U/ML
GLIADIN IGG SER-ACNC: <0.6 U/ML
TTG IGA SER-ACNC: 1 U/ML
TTG IGG SER-ACNC: <0.6 U/ML

## 2023-11-08 NOTE — RESULT ENCOUNTER NOTE
Dear Yoselyn,   Your test results are all back -   The FSH is elevated making me think you could be perimenopausal.    Your TSH, celiac and kidney, liver are all normal.   Glucose was borderline but normal for non-fasting.   Let us know if you have any questions.  -Elisabeth Powell, DO

## 2023-11-10 RX ORDER — METRONIDAZOLE 7.5 MG/G
1 GEL VAGINAL AT BEDTIME
Qty: 70 G | Refills: 0 | Status: SHIPPED | OUTPATIENT
Start: 2023-11-10 | End: 2023-11-15

## 2023-11-22 ENCOUNTER — HOSPITAL ENCOUNTER (OUTPATIENT)
Dept: MAMMOGRAPHY | Facility: CLINIC | Age: 48
Discharge: HOME | End: 2023-11-22
Attending: FAMILY MEDICINE | Admitting: FAMILY MEDICINE
Payer: COMMERCIAL

## 2023-11-22 DIAGNOSIS — Z12.31 VISIT FOR SCREENING MAMMOGRAM: ICD-10-CM

## 2023-11-22 PROCEDURE — 77067 SCR MAMMO BI INCL CAD: CPT

## 2023-12-22 DIAGNOSIS — F41.1 GAD (GENERALIZED ANXIETY DISORDER): ICD-10-CM

## 2023-12-22 RX ORDER — BUPROPION HYDROCHLORIDE 100 MG/1
100 TABLET, EXTENDED RELEASE ORAL DAILY
Qty: 90 TABLET | Refills: 0 | Status: SHIPPED | OUTPATIENT
Start: 2023-12-22 | End: 2024-02-21

## 2023-12-24 ENCOUNTER — NURSE TRIAGE (OUTPATIENT)
Dept: NURSING | Facility: CLINIC | Age: 48
End: 2023-12-24
Payer: COMMERCIAL

## 2023-12-24 NOTE — TELEPHONE ENCOUNTER
"Pt reports \"lower abdominal discomfort.\"  \"Some urinary urgency.\"  No other symptoms at this time.    Advised pt may try self-care measures today including significantly increased fluids, daily cleansing of perineal area w/soap & water, cotton fabric content in underwear.  If no improvement, discussed open urgent cares today/tomorrow ([facility]).  Hours and locations confirmed.  Pt agrees to plan.    [facility] Nurse Advisor     Reason for Disposition   Urinating more frequently than usual (i.e., frequency)    Additional Information   Negative: Shock suspected (e.g., cold/pale/clammy skin, too weak to stand, low BP, rapid pulse)   Negative: Sounds like a life-threatening emergency to the triager   Negative: Followed a female genital area injury (e.g., labia, vagina, vulva)   Negative: Vaginal discharge   Negative: [1] Taking antibiotic for urinary tract infection (UTI) AND [2] female   Negative: [1] Pain or burning with passing urine (urination) AND [2] female   Negative: [1] Pain or burning with passing urine (urination) AND [2] pregnant   Negative: Pain or itching in the vulvar area   Negative: Blood in the urine is main symptom   Negative: Symptoms arising from use of a urinary catheter (e.g., Coude, Desai)   Negative: [1] Unable to urinate (or only a few drops) > 4 hours AND [2] bladder feels very full (e.g., palpable bladder or strong urge to urinate)   Negative: [1] Decreased urination and [2] drinking very little AND [2] dehydration suspected (e.g., dark urine, no urine > 12 hours, very dry mouth, very lightheaded)   Negative: Patient sounds very sick or weak to the triager   Negative: Fever > 100.4 F (38.0 C)   Negative: Side (flank) or lower back pain present    Protocols used: Urinary Symptoms-A-AH    "

## 2024-02-10 ENCOUNTER — HEALTH MAINTENANCE LETTER (OUTPATIENT)
Age: 49
End: 2024-02-10

## 2024-02-14 ENCOUNTER — MYC MEDICAL ADVICE (OUTPATIENT)
Dept: FAMILY MEDICINE | Facility: CLINIC | Age: 49
End: 2024-02-14
Payer: COMMERCIAL

## 2024-02-21 ENCOUNTER — OFFICE VISIT (OUTPATIENT)
Dept: FAMILY MEDICINE | Facility: CLINIC | Age: 49
End: 2024-02-21
Payer: COMMERCIAL

## 2024-02-21 VITALS
DIASTOLIC BLOOD PRESSURE: 79 MMHG | WEIGHT: 148 LBS | BODY MASS INDEX: 27.23 KG/M2 | TEMPERATURE: 97.8 F | RESPIRATION RATE: 16 BRPM | SYSTOLIC BLOOD PRESSURE: 128 MMHG | HEART RATE: 74 BPM | HEIGHT: 62 IN | OXYGEN SATURATION: 98 %

## 2024-02-21 DIAGNOSIS — F41.1 GAD (GENERALIZED ANXIETY DISORDER): ICD-10-CM

## 2024-02-21 DIAGNOSIS — G47.09 OTHER INSOMNIA: ICD-10-CM

## 2024-02-21 DIAGNOSIS — M25.551 HIP PAIN, RIGHT: ICD-10-CM

## 2024-02-21 DIAGNOSIS — E83.110 HEREDITARY HEMOCHROMATOSIS (H): ICD-10-CM

## 2024-02-21 DIAGNOSIS — Z30.41 ENCOUNTER FOR SURVEILLANCE OF CONTRACEPTIVE PILLS: ICD-10-CM

## 2024-02-21 DIAGNOSIS — B00.1 HERPES LABIALIS: ICD-10-CM

## 2024-02-21 DIAGNOSIS — Z00.00 ROUTINE GENERAL MEDICAL EXAMINATION AT A HEALTH CARE FACILITY: Primary | ICD-10-CM

## 2024-02-21 LAB
CHOLEST SERPL-MCNC: 254 MG/DL
FASTING STATUS PATIENT QL REPORTED: YES
FASTING STATUS PATIENT QL REPORTED: YES
FERRITIN SERPL-MCNC: 42 NG/ML (ref 6–175)
GLUCOSE SERPL-MCNC: 89 MG/DL (ref 70–99)
HDLC SERPL-MCNC: 64 MG/DL
IRON BINDING CAPACITY (ROCHE): 268 UG/DL (ref 240–430)
IRON SATN MFR SERPL: 41 % (ref 15–46)
IRON SERPL-MCNC: 110 UG/DL (ref 37–145)
LDLC SERPL CALC-MCNC: 165 MG/DL
NONHDLC SERPL-MCNC: 190 MG/DL
TRIGL SERPL-MCNC: 127 MG/DL
TSH SERPL DL<=0.005 MIU/L-ACNC: 1.6 UIU/ML (ref 0.3–4.2)

## 2024-02-21 PROCEDURE — 80061 LIPID PANEL: CPT | Performed by: FAMILY MEDICINE

## 2024-02-21 PROCEDURE — 82947 ASSAY GLUCOSE BLOOD QUANT: CPT | Performed by: FAMILY MEDICINE

## 2024-02-21 PROCEDURE — 83550 IRON BINDING TEST: CPT | Performed by: FAMILY MEDICINE

## 2024-02-21 PROCEDURE — 82728 ASSAY OF FERRITIN: CPT | Performed by: FAMILY MEDICINE

## 2024-02-21 PROCEDURE — 36415 COLL VENOUS BLD VENIPUNCTURE: CPT | Performed by: FAMILY MEDICINE

## 2024-02-21 PROCEDURE — 99396 PREV VISIT EST AGE 40-64: CPT | Performed by: FAMILY MEDICINE

## 2024-02-21 PROCEDURE — 84443 ASSAY THYROID STIM HORMONE: CPT | Performed by: FAMILY MEDICINE

## 2024-02-21 PROCEDURE — 83540 ASSAY OF IRON: CPT | Performed by: FAMILY MEDICINE

## 2024-02-21 RX ORDER — NORETHINDRONE AND ETHINYL ESTRADIOL 0.4-0.035
KIT ORAL
Qty: 112 TABLET | Refills: 3 | Status: SHIPPED | OUTPATIENT
Start: 2024-02-21

## 2024-02-21 RX ORDER — BUPROPION HYDROCHLORIDE 100 MG/1
100 TABLET, EXTENDED RELEASE ORAL DAILY
Qty: 90 TABLET | Refills: 1 | Status: SHIPPED | OUTPATIENT
Start: 2024-02-21 | End: 2024-10-03

## 2024-02-21 RX ORDER — TRAZODONE HYDROCHLORIDE 50 MG/1
50-100 TABLET, FILM COATED ORAL AT BEDTIME
Qty: 180 TABLET | Refills: 1 | Status: SHIPPED | OUTPATIENT
Start: 2024-02-21

## 2024-02-21 RX ORDER — VALACYCLOVIR HYDROCHLORIDE 1 G/1
2000 TABLET, FILM COATED ORAL 2 TIMES DAILY
Qty: 4 TABLET | Refills: 4 | Status: SHIPPED | OUTPATIENT
Start: 2024-02-21

## 2024-02-21 SDOH — HEALTH STABILITY: PHYSICAL HEALTH: ON AVERAGE, HOW MANY DAYS PER WEEK DO YOU ENGAGE IN MODERATE TO STRENUOUS EXERCISE (LIKE A BRISK WALK)?: 5 DAYS

## 2024-02-21 ASSESSMENT — ANXIETY QUESTIONNAIRES
GAD7 TOTAL SCORE: 3
1. FEELING NERVOUS, ANXIOUS, OR ON EDGE: SEVERAL DAYS
IF YOU CHECKED OFF ANY PROBLEMS ON THIS QUESTIONNAIRE, HOW DIFFICULT HAVE THESE PROBLEMS MADE IT FOR YOU TO DO YOUR WORK, TAKE CARE OF THINGS AT HOME, OR GET ALONG WITH OTHER PEOPLE: NOT DIFFICULT AT ALL
2. NOT BEING ABLE TO STOP OR CONTROL WORRYING: NOT AT ALL
5. BEING SO RESTLESS THAT IT IS HARD TO SIT STILL: NOT AT ALL
6. BECOMING EASILY ANNOYED OR IRRITABLE: SEVERAL DAYS
GAD7 TOTAL SCORE: 3
7. FEELING AFRAID AS IF SOMETHING AWFUL MIGHT HAPPEN: NOT AT ALL
GAD7 TOTAL SCORE: 3
3. WORRYING TOO MUCH ABOUT DIFFERENT THINGS: NOT AT ALL
7. FEELING AFRAID AS IF SOMETHING AWFUL MIGHT HAPPEN: NOT AT ALL
4. TROUBLE RELAXING: SEVERAL DAYS
8. IF YOU CHECKED OFF ANY PROBLEMS, HOW DIFFICULT HAVE THESE MADE IT FOR YOU TO DO YOUR WORK, TAKE CARE OF THINGS AT HOME, OR GET ALONG WITH OTHER PEOPLE?: NOT DIFFICULT AT ALL

## 2024-02-21 ASSESSMENT — SOCIAL DETERMINANTS OF HEALTH (SDOH): HOW OFTEN DO YOU GET TOGETHER WITH FRIENDS OR RELATIVES?: TWICE A WEEK

## 2024-02-21 NOTE — PROGRESS NOTES
"Preventive Care Visit  [facility]  Elisabeth Powell DO, Family Medicine  Feb 21, 2024    Assessment & Plan     Routine general medical examination at a health care facility     - TSH with free T4 reflex; Future  - Lipid panel reflex to direct LDL Fasting; Future  - Glucose; Future    YANIRA (generalized anxiety disorder)     - buPROPion (WELLBUTRIN SR) 100 MG 12 hr tablet; Take 1 tablet (100 mg) by mouth daily  - sertraline (ZOLOFT) 50 MG tablet; TAKE ONE AND ONE-HALF TABLETS BY MOUTH DAILY    Other insomnia     - traZODone (DESYREL) 50 MG tablet; Take 1-2 tablets ( mg) by mouth at bedtime    Herpes labialis     - valACYclovir (VALTREX) 1000 mg tablet; Take 2 tablets (2,000 mg) by mouth 2 times daily    Hip pain, right     - Orthopedic  Referral; Future    Encounter for surveillance of contraceptive pills     - VYFEMLA 0.4-35 MG-MCG tablet; TAKE 1 TABLET BY MOUTH EVERY DAY. ONLY TAKE ACTIVE TABLETS              BMI  Estimated body mass index is 27.07 kg/m  as calculated from the following:    Height as of this encounter: 1.575 m (5' 2\").    Weight as of this encounter: 67.1 kg (148 lb).   Weight management plan: Discussed healthy diet and exercise guidelines    Counseling  Appropriate preventive services were discussed with this patient, including applicable screening as appropriate for fall prevention, nutrition, physical activity, Tobacco-use cessation, weight loss and cognition.  Checklist reviewing preventive services available has been given to the patient.  Reviewed patient's diet, addressing concerns and/or questions.   Discussed possible causes of fatigue.         Darlin Topete is a 48 year old, presenting for the following:  Physical        2/21/2024     8:03 AM   Additional Questions   Roomed by Iraida RAY MA   Accompanied by self         2/21/2024     8:03 AM   Patient Reported Additional Medications   Patient reports taking the following new medications none    "     Health Care Directive  Patient does not have a Health Care Directive or Living Will:     HPI          2/21/2024   General Health   How would you rate your overall physical health? Good   Feel stress (tense, anxious, or unable to sleep) Not at all         2/21/2024   Nutrition   Diet: Regular (no restrictions)    Gluten-free/reduced         2/21/2024   Exercise   Days per week of moderate/strenous exercise 5 days         2/21/2024   Social Factors   Frequency of gathering with friends or relatives Twice a week   Worry food won't last until get money to buy more No   Food not last or not have enough money for food? No   Do you have housing?  Yes   Are you worried about losing your housing? No   Lack of transportation? No   Unable to get utilities (heat,electricity)? No         2/21/2024   Fall Risk   Fallen 2 or more times in the past year? No   Trouble with walking or balance? No          2/21/2024   Dental   Dentist two times every year? Yes          Today's PHQ-9 Score:       2/21/2024     7:51 AM   PHQ-9 SCORE   PHQ-9 Total Score MyChart 1 (Minimal depression)   PHQ-9 Total Score 1         2/21/2024   Substance Use   Alcohol more than 3/day or more than 7/wk No   Do you use any other substances recreationally? No     Social History     Tobacco Use    Smoking status: Never    Smokeless tobacco: Never   Substance Use Topics    Alcohol use: Yes     Comment: socially    Drug use: No           6/16/2023   LAST FHS-7 RESULTS   1st degree relative breast or ovarian cancer Yes   Any relative bilateral breast cancer No   Any male have breast cancer No   Any woman have breast and ovarian cancer Yes   Any woman with breast cancer before 50yrs Yes   2 or more relatives with breast and/or ovarian cancer Unknown   2 or more relatives with breast and/or bowel cancer Unknown        Mammogram Screening - Mammogram every 1-2 years updated in Health Maintenance based on mutual decision making      History of abnormal Pap smear:  NO - age 30-65 PAP every 5 years with negative HPV co-testing recommended        Latest Ref Rng & Units 2022     9:08 AM 2018    10:43 AM 2018    10:01 AM   PAP / HPV   PAP  Negative for Intraepithelial Lesion or Malignancy (NILM)      PAP (Historical)    NIL    HPV 16 DNA Negative Negative  Negative     HPV 18 DNA Negative Negative  Negative     Other HR HPV Negative Negative  Negative       The 10-year ASCVD risk score (Angela AMOR, et al., 2019) is: 0.8%    Values used to calculate the score:      Age: 48 years      Sex: Female      Is Non- : No      Diabetic: No      Tobacco smoker: No      Systolic Blood Pressure: 128 mmHg      Is BP treated: No      HDL Cholesterol: 69 mg/dL      Total Cholesterol: 215 mg/dL        2024   Contraception/Family Planning   Questions about contraception or family planning No        Reviewed and updated as needed this visit by Provider   Tobacco  Allergies  Meds  Problems  Med Hx  Surg Hx  Fam Hx            Patient Active Problem List   Diagnosis    Dysplasia of cervix    CARDIOVASCULAR SCREENING; LDL GOAL LESS THAN 160    Family history of malignant neoplasm of breast    YANIRA (generalized anxiety disorder)    Family history of hemochromatosis    Hereditary hemochromatosis (H24)    Endometrium, polyp    Cyst of left ovary    Basal cell carcinoma (BCC) of back    Hip pain, right     Past Surgical History:   Procedure Laterality Date    ANKLE SURGERY  08/10/2011    APPENDECTOMY       SECTION N/A 2015    Procedure:  SECTION;  Surgeon: Emma Kendall MD;  Location: [facility] L[facility]    COLONOSCOPY N/A 2022    Procedure: COLONOSCOPY;  Surgeon: Anushka Vicente MD;  Location: [facility] OR    DILATION AND CURETTAGE, OPERATIVE HYSTEROSCOPY WITH MORCELLATOR, COMBINED N/A 2021    Procedure: HYSTEROSCOPY, WITH DILATION AND CURETTAGE OF UTERUS USING MORCELLATOR;  Surgeon: Marybel Hutchison MD;  Location: [facility] OR     "ELECTROCONVULSIVE THERAPY  01/08-06/09    [facility] [address]    ORTHOPEDIC SURGERY         Social History     Tobacco Use    Smoking status: Never    Smokeless tobacco: Never   Substance Use Topics    Alcohol use: Yes     Comment: socially     Family History   Problem Relation Age of Onset    C.A.D. Mother     Hypertension Mother     Breast Cancer Mother 50    Other - See Comments Father         hereditary hemochromotosis    C.A.D. Maternal Grandfather     Hypertension Maternal Grandfather     Alcohol/Drug Maternal Grandfather     Depression Paternal Grandmother     C.A.D. Paternal Grandfather     Colon Cancer Maternal Aunt              Review of Systems  Constitutional, HEENT, cardiovascular, pulmonary, gi and gu systems are negative, except as otherwise noted.     Objective    Exam  /79   Pulse 74   Temp 97.8  F (36.6  C) (Temporal)   Resp 16   Ht 1.575 m (5' 2\")   Wt 67.1 kg (148 lb)   SpO2 98%   BMI 27.07 kg/m     Estimated body mass index is 27.07 kg/m  as calculated from the following:    Height as of this encounter: 1.575 m (5' 2\").    Weight as of this encounter: 67.1 kg (148 lb).    Physical Exam  GENERAL: alert and no distress  EYES: Eyes grossly normal to inspection, PERRL and conjunctivae and sclerae normal  HENT: ear canals and TM's normal, nose and mouth without ulcers or lesions  NECK: no adenopathy, no asymmetry, masses, or scars  RESP: lungs clear to auscultation - no rales, rhonchi or wheezes  BREAST: normal without masses, tenderness or nipple discharge and no palpable axillary masses or adenopathy  CV: regular rate and rhythm, normal S1 S2, no S3 or S4, no murmur, click or rub, no peripheral edema  ABDOMEN: soft, nontender, no hepatosplenomegaly, no masses and bowel sounds normal  MS: no gross musculoskeletal defects noted, no edema  SKIN: no suspicious lesions or rashes  NEURO: Normal strength and tone, mentation intact and speech normal  PSYCH: mentation appears normal, affect " normal/bright      Signed Electronically by: Elisabeth Powell, DO    Answers submitted by the patient for this visit:  Patient Health Questionnaire (Submitted on 2/21/2024)  If you checked off any problems, how difficult have these problems made it for you to do your work, take care of things at home, or get along with other people?: Not difficult at all  PHQ9 TOTAL SCORE: 1  YANIRA-7 (Submitted on 2/21/2024)  YANIRA 7 TOTAL SCORE: 3

## 2024-02-21 NOTE — PATIENT INSTRUCTIONS
Preventive Care Advice   This is general advice given by our system to help you stay healthy. However, your care team may have specific advice just for you. Please talk to your care team about your preventive care needs.  Nutrition  Eat 5 or more servings of fruits and vegetables each day.  Try wheat bread, brown rice and whole grain pasta (instead of white bread, rice, and pasta).  Get enough calcium and vitamin D. Check the label on foods and aim for 100% of the RDA (recommended daily allowance).  Lifestyle  Exercise at least 150 minutes each week  (30 minutes a day, 5 days a week).  Do muscle strengthening activities 2 days a week. These help control your weight and prevent disease.  No smoking.  Wear sunscreen to prevent skin cancer.  Have a dental exam and cleaning every 6 months.  Yearly exams  See your health care team every year to talk about:  Any changes in your health.  Any medicines your care team has prescribed.  Preventive care, family planning, and ways to prevent chronic diseases.  Shots (vaccines)   HPV shots (up to age 26), if you've never had them before.  Hepatitis B shots (up to age 59), if you've never had them before.  COVID-19 shot: Get this shot when it's due.  Flu shot: Get a flu shot every year.  Tetanus shot: Get a tetanus shot every 10 years.  Pneumococcal, hepatitis A, and RSV shots: Ask your care team if you need these based on your risk.  Shingles shot (for age 50 and up)  General health tests  Diabetes screening:  Starting at age 35, Get screened for diabetes at least every 3 years.  If you are younger than age 35, ask your care team if you should be screened for diabetes.  Cholesterol test: At age 39, start having a cholesterol test every 5 years, or more often if advised.  Bone density scan (DEXA): At age 50, ask your care team if you should have this scan for osteoporosis (brittle bones).  Hepatitis C: Get tested at least once in your life.  STIs (sexually transmitted  infections)  Before age 24: Ask your care team if you should be screened for STIs.  After age 24: Get screened for STIs if you're at risk. You are at risk for STIs (including HIV) if:  You are sexually active with more than one person.  You don't use condoms every time.  You or a partner was diagnosed with a sexually transmitted infection.  If you are at risk for HIV, ask about PrEP medicine to prevent HIV.  Get tested for HIV at least once in your life, whether you are at risk for HIV or not.  Cancer screening tests  Cervical cancer screening: If you have a cervix, begin getting regular cervical cancer screening tests starting at age 21.  Breast cancer scan (mammogram): If you've ever had breasts, begin having regular mammograms starting at age 40. This is a scan to check for breast cancer.  Colon cancer screening: It is important to start screening for colon cancer at age 45.  Have a colonoscopy test every 10 years (or more often if you're at risk) Or, ask your provider about stool tests like a FIT test every year or Cologuard test every 3 years.  To learn more about your testing options, visit:   https://www.[employer]/083758.pdf.  For help making a decision, visit:   https://bit.ly/rb02486.  Prostate cancer screening test: If you have a prostate, ask your care team if a prostate cancer screening test (PSA) at age 55 is right for you.  Lung cancer screening: If you are a current or former smoker ages 50 to 80, ask your care team if ongoing lung cancer screenings are right for you.  For informational purposes only. Not to replace the advice of your health care provider. Copyright   2023 [facility]. All rights reserved. Clinically reviewed by the [facility]. Trusper [id number] - REV 01/24.    Learning About Sleeping Well  What does sleeping well mean?     Sleeping well means getting enough sleep to feel good and stay healthy. How much sleep is enough varies among  people.  The number of hours you sleep and how you feel when you wake up are both important. If you do not feel refreshed, you probably need more sleep. Another sign of not getting enough sleep is feeling tired during the day.  Experts recommend that adults get at least 7 or more hours of sleep per day. Children and older adults need more sleep.  Why is getting enough sleep important?  Getting enough quality sleep is a basic part of good health. When your sleep suffers, your physical health, mood, and your thoughts can suffer too. You may find yourself feeling more grumpy or stressed. Not getting enough sleep also can lead to serious problems, including injury, accidents, anxiety, and depression.  What might cause poor sleeping?  Many things can cause sleep problems, including:  Changes to your sleep schedule.  Stress. Stress can be caused by fear about a single event, such as giving a speech. Or you may have ongoing stress, such as worry about work or school.  Depression, anxiety, and other mental or emotional conditions.  Changes in your sleep habits or surroundings. This includes changes that happen where you sleep, such as noise, light, or sleeping in a different bed. It also includes changes in your sleep pattern, such as having jet lag or working a late shift.  Health problems, such as pain, breathing problems, and restless legs syndrome.  Lack of regular exercise.  Using alcohol, nicotine, or caffeine before bed.  How can you help yourself?  Here are some tips that may help you sleep more soundly and wake up feeling more refreshed.  Your sleeping area   Use your bedroom only for sleeping and sex. A bit of light reading may help you fall asleep. But if it doesn't, do your reading elsewhere in the house. Try not to use your TV, computer, smartphone, or tablet while you are in bed.  Be sure your bed is big enough to stretch out comfortably, especially if you have a sleep partner.  Keep your bedroom quiet, dark,  "and cool. Use curtains, blinds, or a sleep mask to block out light. To block out noise, use earplugs, soothing music, or a \"white noise\" machine.  Your evening and bedtime routine   Create a relaxing bedtime routine. You might want to take a warm shower or bath, or listen to soothing music.  Go to bed at the same time every night. And get up at the same time every morning, even if you feel tired.  What to avoid   Limit caffeine (coffee, tea, caffeinated sodas) during the day, and don't have any for at least 6 hours before bedtime.  Avoid drinking alcohol before bedtime. Alcohol can cause you to wake up more often during the night.  Try not to smoke or use tobacco, especially in the evening. Nicotine can keep you awake.  Limit naps during the day, especially close to bedtime.  Avoid lying in bed awake for too long. If you can't fall asleep or if you wake up in the middle of the night and can't get back to sleep within about 20 minutes, get out of bed and go to another room until you feel sleepy.  Avoid taking medicine right before bed that may keep you awake or make you feel hyper or energized. Your doctor can tell you if your medicine may do this and if you can take it earlier in the day.  If you can't sleep   Imagine yourself in a peaceful, pleasant scene. Focus on the details and feelings of being in a place that is relaxing.  Get up and do a quiet or boring activity until you feel sleepy.  Avoid drinking any liquids before going to bed to help prevent waking up often to use the bathroom.  Where can you learn more?  Go to https://www.[employer].net/patiented  Enter J942 in the search box to learn more about \"Learning About Sleeping Well.\"  Current as of: July 11, 2023               Content Version: 13.8    8682-0719 [employer].   Care instructions adapted under license by your healthcare professional. If you have questions about a medical condition or this instruction, always ask your healthcare " professional. [employer] disclaims any warranty or liability for your use of this information.

## 2024-02-21 NOTE — RESULT ENCOUNTER NOTE
Dear Yoselyn,   Your test results are all back -   -All of your labs are normal except the cholesterol.   It is up from last year which I suspect is related to hormones.  Keep working on healthy diet and exercise.  Let us know if you have any questions.  -Elisabeth Powell, DO

## 2024-09-25 ENCOUNTER — MYC MEDICAL ADVICE (OUTPATIENT)
Dept: FAMILY MEDICINE | Facility: CLINIC | Age: 49
End: 2024-09-25
Payer: COMMERCIAL

## 2024-10-01 NOTE — TELEPHONE ENCOUNTER
DIAGNOSIS: Hip pain, right    APPOINTMENT DATE: 10/4/2024   NOTES STATUS DETAILS   OFFICE NOTE from referring provider  Elisabeth Powell DO    OFFICE NOTE from other specialist Internal OV with Dr. Powell on 2/21/2024 - Hip Pain    MEDICATION LIST Internal

## 2024-10-02 DIAGNOSIS — F41.1 GAD (GENERALIZED ANXIETY DISORDER): ICD-10-CM

## 2024-10-03 DIAGNOSIS — M25.551 RIGHT HIP PAIN: Primary | ICD-10-CM

## 2024-10-03 RX ORDER — BUPROPION HYDROCHLORIDE 100 MG/1
100 TABLET, EXTENDED RELEASE ORAL DAILY
Qty: 90 TABLET | Refills: 0 | Status: SHIPPED | OUTPATIENT
Start: 2024-10-03

## 2024-10-04 ENCOUNTER — PRE VISIT (OUTPATIENT)
Dept: ORTHOPEDICS | Facility: CLINIC | Age: 49
End: 2024-10-04

## 2024-10-04 ENCOUNTER — TELEPHONE (OUTPATIENT)
Dept: ORTHOPEDICS | Facility: CLINIC | Age: 49
End: 2024-10-04

## 2024-10-04 ENCOUNTER — OFFICE VISIT (OUTPATIENT)
Dept: ORTHOPEDICS | Facility: CLINIC | Age: 49
End: 2024-10-04
Attending: FAMILY MEDICINE
Payer: COMMERCIAL

## 2024-10-04 ENCOUNTER — ANCILLARY PROCEDURE (OUTPATIENT)
Dept: GENERAL RADIOLOGY | Facility: CLINIC | Age: 49
End: 2024-10-04
Attending: FAMILY MEDICINE
Payer: COMMERCIAL

## 2024-10-04 DIAGNOSIS — M25.551 RIGHT HIP PAIN: ICD-10-CM

## 2024-10-04 DIAGNOSIS — G89.29 CHRONIC PAIN OF RIGHT HIP: ICD-10-CM

## 2024-10-04 DIAGNOSIS — M25.551 CHRONIC PAIN OF RIGHT HIP: ICD-10-CM

## 2024-10-04 PROCEDURE — 99203 OFFICE O/P NEW LOW 30 MIN: CPT | Performed by: FAMILY MEDICINE

## 2024-10-04 PROCEDURE — 73502 X-RAY EXAM HIP UNI 2-3 VIEWS: CPT | Mod: RT | Performed by: RADIOLOGY

## 2024-10-04 NOTE — LETTER
10/4/2024      RE: Yoselyn Mcgill  [address] [facility] [address]     Dear Colleague,    Thank you for referring your patient, Yoselyn Mcgill, to the [facility] [place]. Please see a copy of my visit note below.    CHIEF COMPLAINT:  Pain of the Right Hip       HISTORY OF PRESENT ILLNESS  Ms. Mcgill is a pleasant 49 year old year old female who presents to clinic today with right hip.  Yoselyn explains that she has had intermittent right hip pain since being a teenager. She noticed that after starting increasing her yoga frequency, her pain is located anterior hip.  She was seen and began physical therapy which she had been enrolled in for months. This was helpful for strength but did not improve her hip pain.      Onset: gradual  Location: right hip  Quality:  dull and sharp  Duration:  years   Timing:intermittent episodes  Modifying factors:  resting and non-use makes it better, movement and use makes it worse  Associated signs & symptoms: tighter posterior chain   Previous similar pain: Yes  Treatments to date: Stretching, physical therapy, and balance exercises     Additional history: as documented    Review of Systems:  Have you recently had a a fever, chills, weight loss? No  Do you have any vision problems? No  Do you have any chest pain or edema? No  Do you have any shortness of breath or wheezing?  No  Do you have stomach problems? No  Do you have any numbness or focal weakness? No  Do you have diabetes? No  Do you have problems with bleeding or clotting? No  Do you have an rashes or other skin lesions? No    MEDICAL HISTORY  Patient Active Problem List   Diagnosis     Dysplasia of cervix     CARDIOVASCULAR SCREENING; LDL GOAL LESS THAN 160     Family history of malignant neoplasm of breast     YANIRA (generalized anxiety disorder)     Family history of hemochromatosis     Hereditary hemochromatosis (H)     Endometrium, polyp     Cyst of left ovary     Basal  cell carcinoma (BCC) of back     Hip pain, right       Current Outpatient Medications   Medication Sig Dispense Refill     buPROPion (WELLBUTRIN SR) 100 MG 12 hr tablet Take 1 tablet (100 mg) by mouth daily. 90 tablet 0     fluocinonide (LIDEX) 0.05 % external cream Apply topically 2 times daily 60 g 1     ibuprofen (ADVIL/MOTRIN) 600 MG tablet Take 1 tablet (600 mg) by mouth every 6 hours as needed for other (mild and/or inflammatory pain) 30 tablet 0     LORazepam (ATIVAN) 0.5 MG tablet TAKE 1 TABLET(0.5 MG) BY MOUTH EVERY 8 HOURS AS NEEDED FOR ANXIETY 20 tablet 0     Omega-3 Fatty Acids (OMEGA-3 FISH OIL PO) Take 1 g by mouth daily        sertraline (ZOLOFT) 50 MG tablet TAKE ONE AND ONE-HALF TABLETS BY MOUTH DAILY 135 tablet 1     traZODone (DESYREL) 50 MG tablet Take 1-2 tablets ( mg) by mouth at bedtime 180 tablet 1     triamcinolone (ARISTOCORT HP) 0.5 % external cream Apply topically 2 times daily 15 g 0     valACYclovir (VALTREX) 1000 mg tablet Take 2 tablets (2,000 mg) by mouth 2 times daily 4 tablet 4     VYFEMLA 0.4-35 MG-MCG tablet TAKE 1 TABLET BY MOUTH EVERY DAY. ONLY TAKE ACTIVE TABLETS 112 tablet 3       Allergies   Allergen Reactions     Adhesive Tape      EKG patches cause a rash     Amoxicillin Rash       Family History   Problem Relation Age of Onset     C.A.D. Mother      Hypertension Mother      Breast Cancer Mother 50     Other - See Comments Father         hereditary hemochromotosis     C.A.D. Maternal Grandfather      Hypertension Maternal Grandfather      Alcohol/Drug Maternal Grandfather      Depression Paternal Grandmother      C.A.D. Paternal Grandfather      Colon Cancer Maternal Aunt        Additional medical/Social/Surgical [facility] and updated as appropriate.       PHYSICAL EXAM  There were no vitals taken for this visit.    MUSCULOSKELETAL:  Gait: Symmetric gait with normal stride length, step length, and mannie  Palpation: No tenderness to palpation of hip  flexors, greater trochanter, gluteus medius, pyriformis or ischial tuberosity  ROM: Patient with 70 ROM of ER painless / IR 30 with no pain at endpoints. Hip with full flexion extension without pain. Abduction and abduction within normal limits  Muscle Strength:   PSOAS (L2,3): 5/5   Gluteus (L5,S1,2): 5/5   Quadriceps (L3,4): 5/5   Adductor: 5/5   Gluteal: 5/5   Hamstrin/5 at 45 and 90 degrees  Neurologic: Normal sensation of lower extremities    Special Tests:  (-) Straight Leg Raise (SLR)  (-) ANTONI Test  (-) FADIR Test  (-) Posterior impingement  (-) Scour Test  (-) Log Roll    IMAGING : Right hip and pelvis XR. Final results and radiologist's interpretation, available in the [facility] health record. Images were reviewed with the patient/family members in the office today. My personal interpretation of the performed imaging is no acute osseous abnormality or significant degenerative changes of joint.    ASSESSMENT & PLAN  Ms. Mcgill is a 49 year old year old female who presents to clinic today with chronic right hip pain worsening over the past year despite working closely with her physical therapist.    XR unremarkable and exam findings today without significant provocation of symptoms.  DDX includes early DJD hip, labrum pathology, piriformis symdrome, lumbar radiculitis to buttock or gluteal tendinitis.    Diagnosis: Chronic pain of right hip    -MRI right hip without contrast ordered given elusive nature and persistent pain despite conservative measures including extensive PT  -Continue activity / Yoga as tolerated  -Follow up after MRI to discuss next steps including possible PT modifications, addressing lumbar area if unremarkable MRI.    It was a pleasure seeing Yoselyn today.    Emile Landrum DO, [facility]  Primary Care Sports Medicine       Again, thank you for allowing me to participate in the care of your patient.      Sincerely,    Emile Landrum DO

## 2024-10-04 NOTE — PROGRESS NOTES
CHIEF COMPLAINT:  Pain of the Right Hip       HISTORY OF PRESENT ILLNESS  Ms. Mcgill is a pleasant 49 year old year old female who presents to clinic today with right hip.  Yoselyn explains that she has had intermittent right hip pain since being a teenager. She noticed that after starting increasing her yoga frequency, her pain is located anterior hip.  She was seen and began physical therapy which she had been enrolled in for months. This was helpful for strength but did not improve her hip pain.      Onset: gradual  Location: right hip  Quality:  dull and sharp  Duration:  years   Timing:intermittent episodes  Modifying factors:  resting and non-use makes it better, movement and use makes it worse  Associated signs & symptoms: tighter posterior chain   Previous similar pain: Yes  Treatments to date: Stretching, physical therapy, and balance exercises     Additional history: as documented    Review of Systems:  Have you recently had a a fever, chills, weight loss? No  Do you have any vision problems? No  Do you have any chest pain or edema? No  Do you have any shortness of breath or wheezing?  No  Do you have stomach problems? No  Do you have any numbness or focal weakness? No  Do you have diabetes? No  Do you have problems with bleeding or clotting? No  Do you have an rashes or other skin lesions? No    MEDICAL HISTORY  Patient Active Problem List   Diagnosis    Dysplasia of cervix    CARDIOVASCULAR SCREENING; LDL GOAL LESS THAN 160    Family history of malignant neoplasm of breast    YANIRA (generalized anxiety disorder)    Family history of hemochromatosis    Hereditary hemochromatosis (H)    Endometrium, polyp    Cyst of left ovary    Basal cell carcinoma (BCC) of back    Hip pain, right       Current Outpatient Medications   Medication Sig Dispense Refill    buPROPion (WELLBUTRIN SR) 100 MG 12 hr tablet Take 1 tablet (100 mg) by mouth daily. 90 tablet 0    fluocinonide (LIDEX) 0.05 % external cream Apply  topically 2 times daily 60 g 1    ibuprofen (ADVIL/MOTRIN) 600 MG tablet Take 1 tablet (600 mg) by mouth every 6 hours as needed for other (mild and/or inflammatory pain) 30 tablet 0    LORazepam (ATIVAN) 0.5 MG tablet TAKE 1 TABLET(0.5 MG) BY MOUTH EVERY 8 HOURS AS NEEDED FOR ANXIETY 20 tablet 0    Omega-3 Fatty Acids (OMEGA-3 FISH OIL PO) Take 1 g by mouth daily       sertraline (ZOLOFT) 50 MG tablet TAKE ONE AND ONE-HALF TABLETS BY MOUTH DAILY 135 tablet 1    traZODone (DESYREL) 50 MG tablet Take 1-2 tablets ( mg) by mouth at bedtime 180 tablet 1    triamcinolone (ARISTOCORT HP) 0.5 % external cream Apply topically 2 times daily 15 g 0    valACYclovir (VALTREX) 1000 mg tablet Take 2 tablets (2,000 mg) by mouth 2 times daily 4 tablet 4    VYFEMLA 0.4-35 MG-MCG tablet TAKE 1 TABLET BY MOUTH EVERY DAY. ONLY TAKE ACTIVE TABLETS 112 tablet 3       Allergies   Allergen Reactions    Adhesive Tape      EKG patches cause a rash    Amoxicillin Rash       Family History   Problem Relation Age of Onset    C.A.D. Mother     Hypertension Mother     Breast Cancer Mother 50    Other - See Comments Father         hereditary hemochromotosis    C.A.D. Maternal Grandfather     Hypertension Maternal Grandfather     Alcohol/Drug Maternal Grandfather     Depression Paternal Grandmother     C.A.D. Paternal Grandfather     Colon Cancer Maternal Aunt        Additional medical/Social/Surgical histories reviewed in [facility] and updated as appropriate.       PHYSICAL EXAM  There were no vitals taken for this visit.    MUSCULOSKELETAL:  Gait: Symmetric gait with normal stride length, step length, and mannie  Palpation: No tenderness to palpation of hip flexors, greater trochanter, gluteus medius, pyriformis or ischial tuberosity  ROM: Patient with 70 ROM of ER painless / IR 30 with no pain at endpoints. Hip with full flexion extension without pain. Abduction and abduction within normal limits  Muscle Strength:   PSOAS (L2,3): 5/5   Gluteus  (L5,S1,2): 5/5   Quadriceps (L3,4): 5/5   Adductor: 5/5   Gluteal: 5/5   Hamstrin/5 at 45 and 90 degrees  Neurologic: Normal sensation of lower extremities    Special Tests:  (-) Straight Leg Raise (SLR)  (-) ANTONI Test  (-) FADIR Test  (-) Posterior impingement  (-) Scour Test  (-) Log Roll    IMAGING : Right hip and pelvis XR. Final results and radiologist's interpretation, available in the [facility] health record. Images were reviewed with the patient/family members in the office today. My personal interpretation of the performed imaging is no acute osseous abnormality or significant degenerative changes of joint.    ASSESSMENT & PLAN  Ms. Mcgill is a 49 year old year old female who presents to clinic today with chronic right hip pain worsening over the past year despite working closely with her physical therapist.    XR unremarkable and exam findings today without significant provocation of symptoms.  DDX includes early DJD hip, labrum pathology, piriformis symdrome, lumbar radiculitis to buttock or gluteal tendinitis.    Diagnosis: Chronic pain of right hip    -MRI right hip without contrast ordered given elusive nature and persistent pain despite conservative measures including extensive PT  -Continue activity / Yoga as tolerated  -Follow up after MRI to discuss next steps including possible PT modifications, addressing lumbar area if unremarkable MRI.    It was a pleasure seeing Yoselyn today.    Emile Landrum DO, [facility]  Primary Care Sports Medicine

## 2024-10-14 ENCOUNTER — NURSE TRIAGE (OUTPATIENT)
Dept: FAMILY MEDICINE | Facility: CLINIC | Age: 49
End: 2024-10-14
Payer: COMMERCIAL

## 2024-10-14 NOTE — TELEPHONE ENCOUNTER
"ALL BACK IF:  * Difficulty breathing occurs  * Fever lasts more than 3 days   * Nasal discharge lasts more than 10 days   * Cough lasts more than 3 weeks   * You become worse  Hope DONY Keita    Reason for Disposition   Cough with no complications    Answer Assessment - Initial Assessment Questions  1. ONSET: \"When did the cough begin?\"       One week ago  2. SEVERITY: \"How bad is the cough today?\"       Worse in past two days, more congestion  3. SPUTUM: \"Describe the color of your sputum\" (none, dry cough; clear, white, yellow, green)      clear  4. HEMOPTYSIS: \"Are you coughing up any blood?\" If so ask: \"How much?\" (flecks, streaks, tablespoons, etc.)      no  5. DIFFICULTY BREATHING: \"Are you having difficulty breathing?\" If Yes, ask: \"How bad is it?\" (e.g., mild, moderate, severe)     - MILD: No SOB at rest, mild SOB with walking, speaks normally in sentences, can lie down, no retractions, pulse < 100.     - MODERATE: SOB at rest, SOB with minimal exertion and prefers to sit, cannot lie down flat, speaks in phrases, mild retractions, audible wheezing, pulse 100-120.     - SEVERE: Very SOB at rest, speaks in single words, struggling to breathe, sitting hunched forward, retractions, pulse > 120       No   6. FEVER: \"Do you have a fever?\" If Yes, ask: \"What is your temperature, how was it measured, and when did it start?\"      No  7. CARDIAC HISTORY: \"Do you have any history of heart disease?\" (e.g., heart attack, congestive heart failure)       no  8. LUNG HISTORY: \"Do you have any history of lung disease?\"  (e.g., pulmonary embolus, asthma, emphysema)      no  9. PE RISK FACTORS: \"Do you have a history of blood clots?\" (or: recent major surgery, recent prolonged travel, bedridden)      no  10. OTHER SYMPTOMS: \"Do you have any other symptoms?\" (e.g., runny nose, wheezing, chest pain)        no  11. PREGNANCY: \"Is there any chance you are pregnant?\" \"When was your last menstrual period?\"        no    Protocols " used: Cough-A-OH

## 2024-10-15 ENCOUNTER — OFFICE VISIT (OUTPATIENT)
Dept: FAMILY MEDICINE | Facility: CLINIC | Age: 49
End: 2024-10-15
Payer: COMMERCIAL

## 2024-10-15 ENCOUNTER — MYC MEDICAL ADVICE (OUTPATIENT)
Dept: FAMILY MEDICINE | Facility: CLINIC | Age: 49
End: 2024-10-15

## 2024-10-15 VITALS
SYSTOLIC BLOOD PRESSURE: 121 MMHG | WEIGHT: 150.4 LBS | RESPIRATION RATE: 16 BRPM | OXYGEN SATURATION: 97 % | HEART RATE: 76 BPM | TEMPERATURE: 98.3 F | DIASTOLIC BLOOD PRESSURE: 77 MMHG | BODY MASS INDEX: 27.51 KG/M2

## 2024-10-15 DIAGNOSIS — R05.1 ACUTE COUGH: Primary | ICD-10-CM

## 2024-10-15 DIAGNOSIS — J40 BRONCHITIS: ICD-10-CM

## 2024-10-15 LAB
FLUAV RNA SPEC QL NAA+PROBE: ABNORMAL
FLUBV RNA RESP QL NAA+PROBE: ABNORMAL
RSV RNA SPEC NAA+PROBE: ABNORMAL
SARS-COV-2 RNA RESP QL NAA+PROBE: ABNORMAL

## 2024-10-15 PROCEDURE — 99214 OFFICE O/P EST MOD 30 MIN: CPT | Performed by: FAMILY MEDICINE

## 2024-10-15 RX ORDER — AZITHROMYCIN 250 MG/1
TABLET, FILM COATED ORAL
Qty: 6 TABLET | Refills: 0 | Status: SHIPPED | OUTPATIENT
Start: 2024-10-15 | End: 2024-10-20

## 2024-10-15 RX ORDER — GUAIFENESIN/DEXTROMETHORPHAN 100-10MG/5
10 SYRUP ORAL EVERY 4 HOURS PRN
Qty: 238 ML | Refills: 0 | Status: SHIPPED | OUTPATIENT
Start: 2024-10-15 | End: 2024-10-15

## 2024-10-15 RX ORDER — ALBUTEROL SULFATE 90 UG/1
2 INHALANT RESPIRATORY (INHALATION) EVERY 6 HOURS PRN
Qty: 18 G | Refills: 1 | Status: SHIPPED | OUTPATIENT
Start: 2024-10-15

## 2024-10-15 RX ORDER — BENZONATATE 100 MG/1
100 CAPSULE ORAL 3 TIMES DAILY PRN
Qty: 21 CAPSULE | Refills: 0 | Status: SHIPPED | OUTPATIENT
Start: 2024-10-15

## 2024-10-15 RX ORDER — CODEINE PHOSPHATE AND GUAIFENESIN 10; 100 MG/5ML; MG/5ML
1-2 SOLUTION ORAL AT BEDTIME
Qty: 180 ML | Refills: 0 | Status: SHIPPED | OUTPATIENT
Start: 2024-10-15

## 2024-10-15 ASSESSMENT — PAIN SCALES - GENERAL: PAINLEVEL: NO PAIN (0)

## 2024-10-15 NOTE — LETTER
October 15, 2024      Yoselyn Mcgill  [address] [facility] [address]        To Whom It May Concern:    Yoselyn Mcgill was seen in our clinic. She needs to be off work from October 15th , 2024 until October 20th , 2024 because of medical problem.          Sincerely,        Evelyn Madden MD

## 2024-10-15 NOTE — TELEPHONE ENCOUNTER
Patient calling as well  Scheduled this morning  Patient verbalized understanding, agreed with plan of care, and will call back if new or worsening symptoms  ThanksCa RN

## 2024-10-15 NOTE — TELEPHONE ENCOUNTER
"Patient called to verify instructions   Reviewed OV notes today:   \"If not improving by the end of the week can start the zpack , Rx sent\"     Patient verbalized understanding, agreed with plan of care, and will call back if new or worsening symptoms  ThanksCa RN    "

## 2024-10-16 ENCOUNTER — LAB (OUTPATIENT)
Dept: LAB | Facility: CLINIC | Age: 49
End: 2024-10-16
Payer: COMMERCIAL

## 2024-10-16 ENCOUNTER — TELEPHONE (OUTPATIENT)
Dept: FAMILY MEDICINE | Facility: CLINIC | Age: 49
End: 2024-10-16

## 2024-10-16 DIAGNOSIS — R05.1 ACUTE COUGH: Primary | ICD-10-CM

## 2024-10-16 DIAGNOSIS — R05.1 ACUTE COUGH: ICD-10-CM

## 2024-10-16 LAB
FLUAV RNA SPEC QL NAA+PROBE: NEGATIVE
FLUBV RNA RESP QL NAA+PROBE: NEGATIVE
RSV RNA SPEC NAA+PROBE: NEGATIVE
SARS-COV-2 RNA RESP QL NAA+PROBE: NEGATIVE

## 2024-10-16 PROCEDURE — 87637 SARSCOV2&INF A&B&RSV AMP PRB: CPT

## 2024-10-16 NOTE — TELEPHONE ENCOUNTER
Yoselyn calling back to see if the orders were in yet. Relayed message from provider. Scheduled lab only appointment at [place] today to repeat the swabs.     Jenniffer Gibbs RN

## 2024-10-16 NOTE — TELEPHONE ENCOUNTER
Yes, I am trying to figure out with lab what was the reason , but basically she would need to be retested

## 2024-10-16 NOTE — TELEPHONE ENCOUNTER
Yoselyn calling to report that she was in clinic yesterday and was tested to covid, flu and RSV but all of her results says invalid.     She is wondering if she needs to get re tested. Routing to provider to review.     Jenniffer Gibbs RN

## 2024-10-17 NOTE — TELEPHONE ENCOUNTER
Patient called back.  Test results were negative from yesterday.  Still having headache, bodyaches, low grade fever.  All results from yesterday were negative.  Reviewed provider note- recommended to start antibiotic at end of week if not feeling better.  Patient reports understanding.  Izzy VALDOVINOS RN

## 2024-11-24 DIAGNOSIS — F41.1 GAD (GENERALIZED ANXIETY DISORDER): ICD-10-CM

## 2024-12-11 ENCOUNTER — HOSPITAL ENCOUNTER (OUTPATIENT)
Dept: MAMMOGRAPHY | Facility: CLINIC | Age: 49
Discharge: HOME | End: 2024-12-11
Attending: FAMILY MEDICINE
Payer: COMMERCIAL

## 2024-12-11 DIAGNOSIS — Z12.31 VISIT FOR SCREENING MAMMOGRAM: ICD-10-CM

## 2024-12-11 PROCEDURE — 77063 BREAST TOMOSYNTHESIS BI: CPT

## 2024-12-11 PROCEDURE — 77067 SCR MAMMO BI INCL CAD: CPT

## 2024-12-29 DIAGNOSIS — F41.1 GAD (GENERALIZED ANXIETY DISORDER): ICD-10-CM

## 2024-12-30 RX ORDER — BUPROPION HYDROCHLORIDE 100 MG/1
100 TABLET, EXTENDED RELEASE ORAL DAILY
Qty: 90 TABLET | Refills: 0 | Status: SHIPPED | OUTPATIENT
Start: 2024-12-30

## 2025-01-25 ENCOUNTER — MYC MEDICAL ADVICE (OUTPATIENT)
Dept: FAMILY MEDICINE | Facility: CLINIC | Age: 50
End: 2025-01-25
Payer: COMMERCIAL

## 2025-01-28 NOTE — TELEPHONE ENCOUNTER
I think we should probably offer them a virtual visit to discuss options  Can wait for PCP to return    This would be the next step    Melinda Yin MD

## 2025-02-18 DIAGNOSIS — F41.1 GAD (GENERALIZED ANXIETY DISORDER): ICD-10-CM

## 2025-02-25 ENCOUNTER — MYC MEDICAL ADVICE (OUTPATIENT)
Dept: FAMILY MEDICINE | Facility: CLINIC | Age: 50
End: 2025-02-25
Payer: COMMERCIAL

## 2025-02-25 DIAGNOSIS — Z00.00 ROUTINE GENERAL MEDICAL EXAMINATION AT A HEALTH CARE FACILITY: ICD-10-CM

## 2025-02-25 DIAGNOSIS — E83.110 HEREDITARY HEMOCHROMATOSIS: Primary | ICD-10-CM

## 2025-02-25 NOTE — TELEPHONE ENCOUNTER
Dr. Powell,    Please see below [employer] message and advise.   Labs pended, if desired    Thanks,   Lidia ROGERS RN

## 2025-02-25 NOTE — TELEPHONE ENCOUNTER
Triage,   Please see message below and advise.  Don't see any labs for iron and thyroid ordered.   Thanks!  Cherelle RICHARDS

## 2025-02-26 ENCOUNTER — LAB (OUTPATIENT)
Dept: LAB | Facility: CLINIC | Age: 50
End: 2025-02-26
Payer: COMMERCIAL

## 2025-02-26 DIAGNOSIS — Z00.00 ROUTINE GENERAL MEDICAL EXAMINATION AT A HEALTH CARE FACILITY: ICD-10-CM

## 2025-02-26 DIAGNOSIS — E83.110 HEREDITARY HEMOCHROMATOSIS: ICD-10-CM

## 2025-02-26 DIAGNOSIS — R68.82 LOW LIBIDO: ICD-10-CM

## 2025-02-26 LAB
ESTRADIOL SERPL-MCNC: 6 PG/ML
FERRITIN SERPL-MCNC: 54 NG/ML (ref 6–175)
FSH SERPL IRP2-ACNC: 72.5 MIU/ML
IRON BINDING CAPACITY (ROCHE): 283 UG/DL (ref 240–430)
IRON SATN MFR SERPL: 26 % (ref 15–46)
IRON SERPL-MCNC: 73 UG/DL (ref 37–145)
PROGEST SERPL-MCNC: 0.4 NG/ML
TSH SERPL DL<=0.005 MIU/L-ACNC: 1.22 UIU/ML (ref 0.3–4.2)

## 2025-02-26 PROCEDURE — 84144 ASSAY OF PROGESTERONE: CPT

## 2025-02-26 PROCEDURE — 83001 ASSAY OF GONADOTROPIN (FSH): CPT

## 2025-02-26 PROCEDURE — 83540 ASSAY OF IRON: CPT

## 2025-02-26 PROCEDURE — 36415 COLL VENOUS BLD VENIPUNCTURE: CPT

## 2025-02-26 PROCEDURE — 84270 ASSAY OF SEX HORMONE GLOBUL: CPT

## 2025-02-26 PROCEDURE — 82670 ASSAY OF TOTAL ESTRADIOL: CPT

## 2025-02-26 PROCEDURE — 82728 ASSAY OF FERRITIN: CPT

## 2025-02-26 PROCEDURE — 84443 ASSAY THYROID STIM HORMONE: CPT

## 2025-02-26 PROCEDURE — 83550 IRON BINDING TEST: CPT

## 2025-02-26 NOTE — TELEPHONE ENCOUNTER
Pt calling to inquire regarding Iron and Thyroid lab orders.   PCP ordered requested labs on 2/25/25. Instructed pt to schedule a lab only appt for lab completion.     Dede GARZA RN

## 2025-02-27 LAB — SHBG SERPL-SCNC: 115 NMOL/L (ref 30–135)

## 2025-03-01 LAB
TESTOST FREE SERPL-MCNC: 0.15 NG/DL
TESTOST SERPL-MCNC: 21 NG/DL (ref 8–60)

## 2025-04-05 ENCOUNTER — MYC REFILL (OUTPATIENT)
Dept: FAMILY MEDICINE | Facility: CLINIC | Age: 50
End: 2025-04-05
Payer: COMMERCIAL

## 2025-04-05 DIAGNOSIS — L30.9 DERMATITIS: ICD-10-CM

## 2025-04-08 RX ORDER — FLUOCINONIDE 0.5 MG/G
CREAM TOPICAL 2 TIMES DAILY
Qty: 60 G | Refills: 1 | Status: SHIPPED | OUTPATIENT
Start: 2025-04-08

## 2025-04-22 ENCOUNTER — OFFICE VISIT (OUTPATIENT)
Dept: FAMILY MEDICINE | Facility: CLINIC | Age: 50
End: 2025-04-22
Attending: FAMILY MEDICINE
Payer: COMMERCIAL

## 2025-04-22 VITALS
TEMPERATURE: 97.9 F | HEART RATE: 63 BPM | OXYGEN SATURATION: 96 % | DIASTOLIC BLOOD PRESSURE: 72 MMHG | HEIGHT: 63 IN | SYSTOLIC BLOOD PRESSURE: 110 MMHG | WEIGHT: 155.8 LBS | RESPIRATION RATE: 16 BRPM | BODY MASS INDEX: 27.61 KG/M2

## 2025-04-22 DIAGNOSIS — F41.1 GAD (GENERALIZED ANXIETY DISORDER): ICD-10-CM

## 2025-04-22 DIAGNOSIS — Z00.00 ROUTINE GENERAL MEDICAL EXAMINATION AT A HEALTH CARE FACILITY: Primary | ICD-10-CM

## 2025-04-22 DIAGNOSIS — G47.09 OTHER INSOMNIA: ICD-10-CM

## 2025-04-22 LAB
ALBUMIN SERPL BCG-MCNC: 4.1 G/DL (ref 3.5–5.2)
ALP SERPL-CCNC: 69 U/L (ref 40–150)
ALT SERPL W P-5'-P-CCNC: 37 U/L (ref 0–50)
ANION GAP SERPL CALCULATED.3IONS-SCNC: 10 MMOL/L (ref 7–15)
AST SERPL W P-5'-P-CCNC: 39 U/L (ref 0–45)
BILIRUB SERPL-MCNC: 0.3 MG/DL
BUN SERPL-MCNC: 10.7 MG/DL (ref 6–20)
CALCIUM SERPL-MCNC: 9.2 MG/DL (ref 8.8–10.4)
CHLORIDE SERPL-SCNC: 104 MMOL/L (ref 98–107)
CHOLEST SERPL-MCNC: 255 MG/DL
CREAT SERPL-MCNC: 0.86 MG/DL (ref 0.51–0.95)
EGFRCR SERPLBLD CKD-EPI 2021: 82 ML/MIN/1.73M2
FASTING STATUS PATIENT QL REPORTED: YES
FASTING STATUS PATIENT QL REPORTED: YES
GLUCOSE SERPL-MCNC: 90 MG/DL (ref 70–99)
HCO3 SERPL-SCNC: 26 MMOL/L (ref 22–29)
HDLC SERPL-MCNC: 69 MG/DL
LDLC SERPL CALC-MCNC: 156 MG/DL
NONHDLC SERPL-MCNC: 186 MG/DL
POTASSIUM SERPL-SCNC: 4.1 MMOL/L (ref 3.4–5.3)
PROT SERPL-MCNC: 7.2 G/DL (ref 6.4–8.3)
SODIUM SERPL-SCNC: 140 MMOL/L (ref 135–145)
TRIGL SERPL-MCNC: 152 MG/DL

## 2025-04-22 PROCEDURE — 80061 LIPID PANEL: CPT | Performed by: FAMILY MEDICINE

## 2025-04-22 PROCEDURE — 80053 COMPREHEN METABOLIC PANEL: CPT | Performed by: FAMILY MEDICINE

## 2025-04-22 PROCEDURE — 90471 IMMUNIZATION ADMIN: CPT | Performed by: FAMILY MEDICINE

## 2025-04-22 PROCEDURE — 90715 TDAP VACCINE 7 YRS/> IM: CPT | Performed by: FAMILY MEDICINE

## 2025-04-22 PROCEDURE — 3078F DIAST BP <80 MM HG: CPT | Performed by: FAMILY MEDICINE

## 2025-04-22 PROCEDURE — 3074F SYST BP LT 130 MM HG: CPT | Performed by: FAMILY MEDICINE

## 2025-04-22 PROCEDURE — 96127 BRIEF EMOTIONAL/BEHAV ASSMT: CPT | Performed by: FAMILY MEDICINE

## 2025-04-22 PROCEDURE — 36415 COLL VENOUS BLD VENIPUNCTURE: CPT | Performed by: FAMILY MEDICINE

## 2025-04-22 PROCEDURE — 99396 PREV VISIT EST AGE 40-64: CPT | Mod: 25 | Performed by: FAMILY MEDICINE

## 2025-04-22 RX ORDER — ESTRADIOL 0.03 MG/D
1 FILM, EXTENDED RELEASE TRANSDERMAL
COMMUNITY
Start: 2025-04-14

## 2025-04-22 RX ORDER — TRAZODONE HYDROCHLORIDE 50 MG/1
50 TABLET ORAL AT BEDTIME
Qty: 90 TABLET | Refills: 3 | Status: SHIPPED | OUTPATIENT
Start: 2025-04-22

## 2025-04-22 RX ORDER — BUPROPION HYDROCHLORIDE 100 MG/1
100 TABLET, EXTENDED RELEASE ORAL 2 TIMES DAILY
Qty: 180 TABLET | Refills: 1 | Status: SHIPPED | OUTPATIENT
Start: 2025-04-22

## 2025-04-22 RX ORDER — PROGESTERONE 200 MG/1
200 CAPSULE ORAL
COMMUNITY
Start: 2025-04-15

## 2025-04-22 SDOH — HEALTH STABILITY: PHYSICAL HEALTH: ON AVERAGE, HOW MANY DAYS PER WEEK DO YOU ENGAGE IN MODERATE TO STRENUOUS EXERCISE (LIKE A BRISK WALK)?: 7 DAYS

## 2025-04-22 ASSESSMENT — SOCIAL DETERMINANTS OF HEALTH (SDOH): HOW OFTEN DO YOU GET TOGETHER WITH FRIENDS OR RELATIVES?: TWICE A WEEK

## 2025-04-22 ASSESSMENT — ANXIETY QUESTIONNAIRES
GAD7 TOTAL SCORE: 0
7. FEELING AFRAID AS IF SOMETHING AWFUL MIGHT HAPPEN: NOT AT ALL
7. FEELING AFRAID AS IF SOMETHING AWFUL MIGHT HAPPEN: NOT AT ALL
1. FEELING NERVOUS, ANXIOUS, OR ON EDGE: NOT AT ALL
4. TROUBLE RELAXING: NOT AT ALL
6. BECOMING EASILY ANNOYED OR IRRITABLE: NOT AT ALL
GAD7 TOTAL SCORE: 0
8. IF YOU CHECKED OFF ANY PROBLEMS, HOW DIFFICULT HAVE THESE MADE IT FOR YOU TO DO YOUR WORK, TAKE CARE OF THINGS AT HOME, OR GET ALONG WITH OTHER PEOPLE?: NOT DIFFICULT AT ALL
GAD7 TOTAL SCORE: 0
3. WORRYING TOO MUCH ABOUT DIFFERENT THINGS: NOT AT ALL
5. BEING SO RESTLESS THAT IT IS HARD TO SIT STILL: NOT AT ALL
IF YOU CHECKED OFF ANY PROBLEMS ON THIS QUESTIONNAIRE, HOW DIFFICULT HAVE THESE PROBLEMS MADE IT FOR YOU TO DO YOUR WORK, TAKE CARE OF THINGS AT HOME, OR GET ALONG WITH OTHER PEOPLE: NOT DIFFICULT AT ALL
2. NOT BEING ABLE TO STOP OR CONTROL WORRYING: NOT AT ALL

## 2025-04-22 ASSESSMENT — PATIENT HEALTH QUESTIONNAIRE - PHQ9
10. IF YOU CHECKED OFF ANY PROBLEMS, HOW DIFFICULT HAVE THESE PROBLEMS MADE IT FOR YOU TO DO YOUR WORK, TAKE CARE OF THINGS AT HOME, OR GET ALONG WITH OTHER PEOPLE: SOMEWHAT DIFFICULT
SUM OF ALL RESPONSES TO PHQ QUESTIONS 1-9: 2
SUM OF ALL RESPONSES TO PHQ QUESTIONS 1-9: 2

## 2025-04-22 NOTE — PROGRESS NOTES
"Preventive Care Visit  [facility]  Elisabeth Powell DO, Family Medicine  Apr 22, 2025      Assessment & Plan     Routine general medical examination at a health care facility     - Comprehensive metabolic panel (BMP + Alb, Alk Phos, ALT, AST, Total. Bili, TP); Future  - Lipid panel reflex to direct LDL Fasting; Future    YANIRA (generalized anxiety disorder)     - buPROPion (WELLBUTRIN SR) 100 MG 12 hr tablet; Take 1 tablet (100 mg) by mouth 2 times daily.    Other insomnia     - traZODone (DESYREL) 50 MG tablet; Take 1 tablet (50 mg) by mouth at bedtime.            BMI  Estimated body mass index is 27.69 kg/m  as calculated from the following:    Height as of this encounter: 1.598 m (5' 2.9\").    Weight as of this encounter: 70.7 kg (155 lb 12.8 oz).       Counseling  Appropriate preventive services were addressed with this patient via screening, questionnaire, or discussion as appropriate for fall prevention, nutrition, physical activity, Tobacco-use cessation, social engagement, weight loss and cognition.  Checklist reviewing preventive services available has been given to the patient.  Reviewed patient's diet, addressing concerns and/or questions.   Discussed possible causes of fatigue.         Darlin Topete is a 49 year old, presenting for the following:  Physical        4/22/2025     7:48 AM   Additional Questions   Roomed by Esther          HPI  Answers submitted by the patient for this visit:  Patient Health Questionnaire (Submitted on 4/22/2025)  If you checked off any problems, how difficult have these problems made it for you to do your work, take care of things at home, or get along with other people?: Somewhat difficult  PHQ9 TOTAL SCORE: 2  Patient Health Questionnaire (G7) (Submitted on 4/22/2025)  YANIRA 7 TOTAL SCORE: 0           Advance Care Planning    Discussed advance care planning with patient; informed AVS has link to [facility] [employer].        4/22/2025   General Health "   How would you rate your overall physical health? Good   Feel stress (tense, anxious, or unable to sleep) Only a little   (!) STRESS CONCERN      4/22/2025   Nutrition   Diet: Regular (no restrictions)         4/22/2025   Exercise   Days per week of moderate/strenous exercise 7 days         4/22/2025   Social Factors   Frequency of gathering with friends or relatives Twice a week   Worry food won't last until get money to buy more No   Food not last or not have enough money for food? No   Do you have housing? (Housing is defined as stable permanent housing and does not include staying ouside in a car, in a tent, in an abandoned building, in an overnight shelter, or couch-surfing.) Yes   Are you worried about losing your housing? No   Lack of transportation? No   Unable to get utilities (heat,electricity)? No         4/22/2025   Fall Risk   Fallen 2 or more times in the past year? No   Trouble with walking or balance? No          4/22/2025   Dental   Dentist two times every year? Yes       Today's PHQ-9 Score:       4/22/2025     7:43 AM   PHQ-9 SCORE   PHQ-9 Total Score MyChart 2 (Minimal depression)   PHQ-9 Total Score 2        Patient-reported         4/22/2025   Substance Use   Alcohol more than 3/day or more than 7/wk No   Do you use any other substances recreationally? No     Social History     Tobacco Use    Smoking status: Never    Smokeless tobacco: Never   Vaping Use    Vaping status: Never Used   Substance Use Topics    Alcohol use: Yes     Comment: socially    Drug use: No           12/11/2024   LAST FHS-7 RESULTS   1st degree relative breast or ovarian cancer Yes   Any relative bilateral breast cancer No   Any male have breast cancer No   Any ONE woman have BOTH breast AND ovarian cancer No   Any woman with breast cancer before 50yrs No   2 or more relatives with breast AND/OR ovarian cancer No   2 or more relatives with breast AND/OR bowel cancer No        Mammogram Screening - Mammogram every 1-2  years updated in Health Maintenance based on mutual decision making      History of abnormal Pap smear: YES - reflected in Problem List and Health Maintenance accordingly        Latest Ref Rng & Units 2022     9:08 AM 2018    10:43 AM 2018    10:01 AM   PAP / HPV   PAP  Negative for Intraepithelial Lesion or Malignancy (NILM)      PAP (Historical)    NIL    HPV 16 DNA Negative Negative  Negative     HPV 18 DNA Negative Negative  Negative     Other HR HPV Negative Negative  Negative       ASCVD Risk   The 10-year ASCVD risk score (Angela AMOR, et al., 2019) is: 1%    Values used to calculate the score:      Age: 49 years      Sex: Female      Is Non- : No      Diabetic: No      Tobacco smoker: No      Systolic Blood Pressure: 110 mmHg      Is BP treated: No      HDL Cholesterol: 64 mg/dL      Total Cholesterol: 254 mg/dL        2025   Contraception/Family Planning   Questions about contraception or family planning No        Reviewed and updated as needed this visit by Provider   Tobacco  Allergies  Meds  Problems  Med Hx  Surg Hx  Fam Hx            Patient Active Problem List   Diagnosis    Dysplasia of cervix    CARDIOVASCULAR SCREENING; LDL GOAL LESS THAN 160    Family history of malignant neoplasm of breast    YANIRA (generalized anxiety disorder)    Family history of hemochromatosis    Hereditary hemochromatosis    Endometrium, polyp    Cyst of left ovary    Basal cell carcinoma (BCC) of back    Hip pain, right     Past Surgical History:   Procedure Laterality Date    ANKLE SURGERY  08/10/2011    APPENDECTOMY       SECTION N/A 2015    Procedure:  SECTION;  Surgeon: Emma Kendall MD;  Location: [facility] L+D    COLONOSCOPY N/A 2022    Procedure: COLONOSCOPY;  Surgeon: Anushka Vicente MD;  Location: [facility] OR    DILATION AND CURETTAGE, OPERATIVE HYSTEROSCOPY WITH MORCELLATOR, COMBINED N/A 2021    Procedure: HYSTEROSCOPY, WITH  "DILATION AND CURETTAGE OF UTERUS USING MORCELLATOR;  Surgeon: Marybel Hutchison MD;  Location: [facility] OR    ELECTROCONVULSIVE THERAPY  01/08-06/09    [facility] Dr Carpenter    ORTHOPEDIC SURGERY         Social History     Tobacco Use    Smoking status: Never    Smokeless tobacco: Never   Substance Use Topics    Alcohol use: Yes     Comment: socially     Family History   Problem Relation Age of Onset    C.A.D. Mother     Hypertension Mother     Breast Cancer Mother 50    Other - See Comments Father         hereditary hemochromotosis    C.A.D. Maternal Grandfather     Hypertension Maternal Grandfather     Alcohol/Drug Maternal Grandfather     Depression Paternal Grandmother     C.A.D. Paternal Grandfather     Colon Cancer Maternal Aunt              Review of Systems  Constitutional, HEENT, cardiovascular, pulmonary, GI, , musculoskeletal, neuro, skin, endocrine and psych systems are negative, except as otherwise noted.     Objective    Exam  /72 (BP Location: Right arm, Patient Position: Sitting, Cuff Size: Adult Regular)   Pulse 63   Temp 97.9  F (36.6  C) (Skin)   Resp 16   Ht 1.598 m (5' 2.9\")   Wt 70.7 kg (155 lb 12.8 oz)   LMP  (LMP Unknown)   SpO2 96%   Breastfeeding No   BMI 27.69 kg/m     Estimated body mass index is 27.69 kg/m  as calculated from the following:    Height as of this encounter: 1.598 m (5' 2.9\").    Weight as of this encounter: 70.7 kg (155 lb 12.8 oz).    Physical Exam  GENERAL: alert and no distress  EYES: Eyes grossly normal to inspection, PERRL and conjunctivae and sclerae normal  HENT: ear canals and TM's normal, nose and mouth without ulcers or lesions  NECK: no adenopathy, no asymmetry, masses, or scars  RESP: lungs clear to auscultation - no rales, rhonchi or wheezes  BREAST: normal without masses, tenderness or nipple discharge and no palpable axillary masses or adenopathy  CV: regular rate and rhythm, normal S1 S2, no S3 or S4, no murmur, click or rub, no peripheral " edema  ABDOMEN: soft, nontender, no hepatosplenomegaly, no masses and bowel sounds normal  MS: no gross musculoskeletal defects noted, no edema  SKIN: no suspicious lesions or rashes  NEURO: Normal strength and tone, mentation intact and speech normal  PSYCH: mentation appears normal, affect normal/bright        Signed Electronically by: Elisabeth Powell DO

## 2025-04-22 NOTE — PATIENT INSTRUCTIONS
Patient Education   Preventive Care Advice   This is general advice given by our system to help you stay healthy. However, your care team may have specific advice just for you. Please talk to your care team about your preventive care needs.  Nutrition  Eat 5 or more servings of fruits and vegetables each day.  Try wheat bread, brown rice and whole grain pasta (instead of white bread, rice, and pasta).  Get enough calcium and vitamin D. Check the label on foods and aim for 100% of the RDA (recommended daily allowance).  Lifestyle  Exercise at least 150 minutes each week  (30 minutes a day, 5 days a week).  Do muscle strengthening activities 2 days a week. These help control your weight and prevent disease.  No smoking.  Wear sunscreen to prevent skin cancer.  Have a dental exam and cleaning every 6 months.  Yearly exams  See your health care team every year to talk about:  Any changes in your health.  Any medicines your care team has prescribed.  Preventive care, family planning, and ways to prevent chronic diseases.  Shots (vaccines)   HPV shots (up to age 26), if you've never had them before.  Hepatitis B shots (up to age 59), if you've never had them before.  COVID-19 shot: Get this shot when it's due.  Flu shot: Get a flu shot every year.  Tetanus shot: Get a tetanus shot every 10 years.  Pneumococcal, hepatitis A, and RSV shots: Ask your care team if you need these based on your risk.  Shingles shot (for age 50 and up)  General health tests  Diabetes screening:  Starting at age 35, Get screened for diabetes at least every 3 years.  If you are younger than age 35, ask your care team if you should be screened for diabetes.  Cholesterol test: At age 39, start having a cholesterol test every 5 years, or more often if advised.  Bone density scan (DEXA): At age 50, ask your care team if you should have this scan for osteoporosis (brittle bones).  Hepatitis C: Get tested at least once in your life.  STIs (sexually  transmitted infections)  Before age 24: Ask your care team if you should be screened for STIs.  After age 24: Get screened for STIs if you're at risk. You are at risk for STIs (including HIV) if:  You are sexually active with more than one person.  You don't use condoms every time.  You or a partner was diagnosed with a sexually transmitted infection.  If you are at risk for HIV, ask about PrEP medicine to prevent HIV.  Get tested for HIV at least once in your life, whether you are at risk for HIV or not.  Cancer screening tests  Cervical cancer screening: If you have a cervix, begin getting regular cervical cancer screening tests starting at age 21.  Breast cancer scan (mammogram): If you've ever had breasts, begin having regular mammograms starting at age 40. This is a scan to check for breast cancer.  Colon cancer screening: It is important to start screening for colon cancer at age 45.  Have a colonoscopy test every 10 years (or more often if you're at risk) Or, ask your provider about stool tests like a FIT test every year or Cologuard test every 3 years.  To learn more about your testing options, visit:   .  For help making a decision, visit:   https://bit.ly/ft34280.  Prostate cancer screening test: If you have a prostate, ask your care team if a prostate cancer screening test (PSA) at age 55 is right for you.  Lung cancer screening: If you are a current or former smoker ages 50 to 80, ask your care team if ongoing lung cancer screenings are right for you.  For informational purposes only. Not to replace the advice of your health care provider. Copyright   2023 [facility]. All rights reserved. Clinically reviewed by the [facility]. [place] 056806 - REV 01/24.  Learning About Sleeping Well  What does sleeping well mean?     Sleeping well means getting enough sleep to feel good and stay healthy. How much sleep is enough varies among people.  The number of hours you  sleep and how you feel when you wake up are both important. If you do not feel refreshed, you probably need more sleep. Another sign of not getting enough sleep is feeling tired during the day.  Experts recommend that adults get at least 7 or more hours of sleep per day. Children and older adults need more sleep.  Why is getting enough sleep important?  Getting enough quality sleep is a basic part of good health. When your sleep suffers, your physical health, mood, and your thoughts can suffer too. You may find yourself feeling more grumpy or stressed. Not getting enough sleep also can lead to serious problems, including injury, accidents, anxiety, and depression.  What might cause poor sleeping?  Many things can cause sleep problems, including:  Changes to your sleep schedule.  Stress. Stress can be caused by fear about a single event, such as giving a speech. Or you may have ongoing stress, such as worry about work or school.  Depression, anxiety, and other mental or emotional conditions.  Changes in your sleep habits or surroundings. This includes changes that happen where you sleep, such as noise, light, or sleeping in a different bed. It also includes changes in your sleep pattern, such as having jet lag or working a late shift.  Health problems, such as pain, breathing problems, and restless legs syndrome.  Lack of regular exercise.  Using alcohol, nicotine, or caffeine before bed.  How can you help yourself?  Here are some tips that may help you sleep more soundly and wake up feeling more refreshed.  Your sleeping area   Use your bedroom only for sleeping and sex. A bit of light reading may help you fall asleep. But if it doesn't, do your reading elsewhere in the house. Try not to use your TV, computer, smartphone, or tablet while you are in bed.  Be sure your bed is big enough to stretch out comfortably, especially if you have a sleep partner.  Keep your bedroom quiet, dark, and cool. Use curtains, blinds,  "or a sleep mask to block out light. To block out noise, use earplugs, soothing music, or a \"white noise\" machine.  Your evening and bedtime routine   Create a relaxing bedtime routine. You might want to take a warm shower or bath, or listen to soothing music.  Go to bed at the same time every night. And get up at the same time every morning, even if you feel tired.  What to avoid   Limit caffeine (coffee, tea, caffeinated sodas) during the day, and don't have any for at least 6 hours before bedtime.  Avoid drinking alcohol before bedtime. Alcohol can cause you to wake up more often during the night.  Try not to smoke or use tobacco, especially in the evening. Nicotine can keep you awake.  Limit naps during the day, especially close to bedtime.  Avoid lying in bed awake for too long. If you can't fall asleep or if you wake up in the middle of the night and can't get back to sleep within about 20 minutes, get out of bed and go to another room until you feel sleepy.  Avoid taking medicine right before bed that may keep you awake or make you feel hyper or energized. Your doctor can tell you if your medicine may do this and if you can take it earlier in the day.  If you can't sleep   Imagine yourself in a peaceful, pleasant scene. Focus on the details and feelings of being in a place that is relaxing.  Get up and do a quiet or boring activity until you feel sleepy.  Avoid drinking any liquids before going to bed to help prevent waking up often to use the bathroom.  Where can you learn more?  Go to https://www.[employer].net/patiented  Enter J942 in the search box to learn more about \"Learning About Sleeping Well.\"  Current as of: July 31, 2024  Content Version: 14.4    0551-0461 [employer].   Care instructions adapted under license by your healthcare professional. If you have questions about a medical condition or this instruction, always ask your healthcare professional. [employer] disclaims any " warranty or liability for your use of this information.

## 2025-04-23 NOTE — RESULT ENCOUNTER NOTE
Dear Yoselyn,   Your test results are all back -   -Cholesterol levels (LDL,HDL, Triglycerides) are stable.   ADVISE: rechecking in 1 year.   -Liver and gallbladder tests are normal (ALT,AST, Alk phos, bilirubin), kidney function is normal (Cr, GFR), sodium is normal, potassium is normal, calcium is normal, glucose is normal.  Let us know if you have any questions.  -Elisabeth Powell, DO

## 2025-05-17 ENCOUNTER — RESULTS FOLLOW-UP (OUTPATIENT)
Dept: URGENT CARE | Facility: URGENT CARE | Age: 50
End: 2025-05-17

## 2025-05-17 ENCOUNTER — ANCILLARY PROCEDURE (OUTPATIENT)
Dept: GENERAL RADIOLOGY | Facility: CLINIC | Age: 50
End: 2025-05-17
Attending: NURSE PRACTITIONER
Payer: COMMERCIAL

## 2025-05-17 ENCOUNTER — OFFICE VISIT (OUTPATIENT)
Dept: URGENT CARE | Facility: URGENT CARE | Age: 50
End: 2025-05-17
Payer: COMMERCIAL

## 2025-05-17 ENCOUNTER — NURSE TRIAGE (OUTPATIENT)
Dept: NURSING | Facility: CLINIC | Age: 50
End: 2025-05-17
Payer: COMMERCIAL

## 2025-05-17 VITALS
SYSTOLIC BLOOD PRESSURE: 135 MMHG | HEIGHT: 63 IN | OXYGEN SATURATION: 98 % | DIASTOLIC BLOOD PRESSURE: 85 MMHG | WEIGHT: 151 LBS | BODY MASS INDEX: 26.75 KG/M2 | TEMPERATURE: 101.7 F | RESPIRATION RATE: 18 BRPM | HEART RATE: 90 BPM

## 2025-05-17 DIAGNOSIS — R50.9 FEVER, UNSPECIFIED FEVER CAUSE: ICD-10-CM

## 2025-05-17 DIAGNOSIS — R05.1 ACUTE COUGH: ICD-10-CM

## 2025-05-17 DIAGNOSIS — Z20.89 PNEUMONIA EXPOSURE: ICD-10-CM

## 2025-05-17 DIAGNOSIS — R53.81 MALAISE: ICD-10-CM

## 2025-05-17 DIAGNOSIS — R06.02 SOB (SHORTNESS OF BREATH): ICD-10-CM

## 2025-05-17 DIAGNOSIS — J06.9 VIRAL URI WITH COUGH: Primary | ICD-10-CM

## 2025-05-17 PROCEDURE — 71046 X-RAY EXAM CHEST 2 VIEWS: CPT | Mod: TC | Performed by: RADIOLOGY

## 2025-05-17 PROCEDURE — 3075F SYST BP GE 130 - 139MM HG: CPT | Performed by: NURSE PRACTITIONER

## 2025-05-17 PROCEDURE — 3079F DIAST BP 80-89 MM HG: CPT | Performed by: NURSE PRACTITIONER

## 2025-05-17 PROCEDURE — 99213 OFFICE O/P EST LOW 20 MIN: CPT | Performed by: NURSE PRACTITIONER

## 2025-05-17 NOTE — PROGRESS NOTES
Assessment & Plan     1. Viral URI with cough (Primary)    Discussed symptoms of viral infection  We discussed that symptoms of cough will likely be the last 2 clear.  May use OTC treatment such as Mucinex, Robitussin or other antitussive medication.  Vaporizer or humidifier in room would be helpful.  We discussed using throat lozenges to help with throat and cough.  We discussed red flag symptoms that would warrant emergent or urgent evaluation patient verbalized understanding and was in agreement with this plan.      2. SOB (shortness of breath)    - XR Chest 2 Views    3. Acute cough    - XR Chest 2 Views    4. Fever, unspecified fever cause    - XR Chest 2 Views    5. Malaise    - XR Chest 2 Views    6. Pneumonia exposure  Xray clear per radiology read.  - XR Chest 2 Views    JANICE Puente [facility] SHARI Topete is a 49 year old female who presents to clinic today for the following health issues:  Chief Complaint   Patient presents with    Cough     Body ache, chills. Possible pneumonia, chest hurts when coughing     Fever     Runny fever x 3 days. Today temp of 101.7. pts daughter is being treated for pneumonia          5/17/2025     9:51 AM   Additional Questions   Roomed by radha   Accompanied by self     HPI      URI Adult    Onset of symptoms was 3 day(s) ago.  Course of illness is worsening.    Severity moderate  Current and Associated symptoms: fever, chills, cough - productive, shortness of breath, body aches, and fatigue  Treatment measures tried include Tylenol/Ibuprofen, OTC Cough med, Fluids, and Rest.  Predisposing factors include ill contact: Family member .      Review of Systems  Constitutional, HEENT, cardiovascular, pulmonary, gi and gu systems are negative, except as otherwise noted.      Objective    /85 (BP Location: Right arm, Patient Position: Chair, Cuff Size: Adult Regular)   Pulse 90   Temp (!) 101.7  F (38.7  C) (Tympanic)    "Resp 18   Ht 1.598 m (5' 2.9\")   Wt 68.5 kg (151 lb)   LMP  (LMP Unknown)   SpO2 98%   BMI 26.83 kg/m    Physical Exam   GENERAL: alert and no distress  EYES: Eyes grossly normal to inspection, PERRL and conjunctivae and sclerae normal  HENT: normal cephalic/atraumatic, ear canals and TM's normal, nose and mouth without ulcers or lesions, oropharynx clear, and oral mucous membranes moist  NECK: bilateral anterior cervical adenopathy, no asymmetry, masses, or scars, and thyroid normal to palpation  RESP: rhonchi bibasilar  CV: regular rate and rhythm, normal S1 S2, no S3 or S4, no murmur, click or rub, no peripheral edema  ABDOMEN: soft, nontender, no hepatosplenomegaly, no masses and bowel sounds normal  MS: no gross musculoskeletal defects noted, no edema    Results for orders placed or performed in visit on 05/17/25   XR Chest 2 Views     Status: None    Narrative    EXAM: XR CHEST 2 VIEWS  LOCATION: [facility] [place]  DATE: 5/17/2025    INDICATION: Shortness of breath, cough and fever.  COMPARISON: None.      Impression    IMPRESSION: Negative chest.           "

## 2025-05-17 NOTE — Clinical Note
May 17, 2025      Yoselyn Mcgill  [address] [address]        To Whom It May Concern:    Yoselyn Mcgill  was seen on ***.  Please excuse her  until *** due to {WORK EXCUSE:667205}.        Sincerely,        JANICE Puente CNP    Electronically signed

## 2025-05-17 NOTE — Clinical Note
2025    Yoselyn Mcgill   1975        To Whom it May Concern;    Please excuse Yoselyn Mcgill from work/school for a healthcare visit on May 17, 2025.    Sincerely,        JANICE Puente CNP

## 2025-05-17 NOTE — PROGRESS NOTES
Urgent Care Clinic Visit    Chief Complaint   Patient presents with    Cough     Body ache, chills. Possible pneumonia, chest hurts when coughing     Fever     Runny fever x 3 days. Today temp of 101.7. pts daughter is being treated for pneumonia                5/17/2025     9:51 AM   Additional Questions   Roomed by radha   Accompanied by self

## 2025-05-17 NOTE — TELEPHONE ENCOUNTER
Nurse Triage SBAR    Is this a 2nd Level Triage? NO    Situation: Started feeling ill Wednesday afternoon.. Child ill with pneumonia.    Background: Fatigue and fever, cough, bodyaches Negative covid.    Assessment: Needs to be evaluated    Protocol Recommended Disposition:   Be seen within 24 hrs.  Caller stated understanding and agreement.      Reason for Disposition   Fever present > 3 days (72 hours)    Additional Information   Negative: SEVERE difficulty breathing (e.g., struggling for each breath, speaks in single words)   Negative: Bluish (or gray) lips or face now   Negative: [1] Rapid onset of cough AND [2] has hives   Negative: Coughing started suddenly after medicine, an allergic food or bee sting   Negative: [1] Difficulty breathing AND [2] exposure to flames, smoke, or fumes   Negative: [1] Stridor AND [2] difficulty breathing   Negative: Sounds like a life-threatening emergency to the triager   Negative: Choked on object of food that could be caught in the throat   Negative: Chest pain is main symptom   Negative: [1] Previous asthma attacks AND [2] this feels like asthma attack   Negative: Cough lasts > 3 weeks   Negative: Wet cough (productive; white-yellow, yellow, green, or corrina colored sputum)   Negative: [1] Dry cough (non-productive;  no sputum or minimal clear sputum) AND [2] within 14 days of COVID-19 Exposure   Negative: [1] MODERATE difficulty breathing (e.g., speaks in phrases, SOB even at rest, pulse 100-120) AND [2] still present when not coughing   Negative: Chest pain  (Exception: MILD central chest pain, present only when coughing.)   Negative: Patient sounds very sick or weak to the triager   Negative: [1] MILD difficulty breathing (e.g., minimal/no SOB at rest, SOB with walking, pulse <100) AND [2] still present when not coughing   Negative: [1] Coughed up blood AND [2] > 1 tablespoon (15 ml)   (Exception: Blood-tinged sputum.)   Negative: Fever > 103 F (39.4 C)   Negative: [1] Fever  "> 101 F (38.3 C) AND [2] age > 60 years   Negative: [1] Fever > 100.0 F (37.8 C) AND [2] diabetes mellitus or weak immune system (e.g., HIV positive, cancer chemo, splenectomy, organ transplant, chronic steroids)   Negative: Wheezing is present   Negative: [1] Ankle swelling AND [2] swelling is increasing   Negative: [1] Fever > 100.0 F (37.8 C) AND [2] bedridden (e.g., CVA, chronic illness, recovering from surgery)   Negative: SEVERE coughing spells (e.g., whooping sound after coughing, vomiting after coughing)   Negative: [1] Continuous (nonstop) coughing interferes with work or school AND [2] no improvement using cough treatment per Care Advice    Answer Assessment - Initial Assessment Questions  1. ONSET: \"When did the cough begin?\"       Thursday  2. SEVERITY: \"How bad is the cough today?\"       6/10  3. SPUTUM: \"Describe the color of your sputum\" (none, dry cough; clear, white, yellow, green)      NO  4. HEMOPTYSIS: \"Are you coughing up any blood?\" If so ask: \"How much?\" (flecks, streaks, tablespoons, etc.)      nO  5. DIFFICULTY BREATHING: \"Are you having difficulty breathing?\" If Yes, ask: \"How bad is it?\" (e.g., mild, moderate, severe)     - MILD: No SOB at rest, mild SOB with walking, speaks normally in sentences, can lie down, no retractions, pulse < 100.     - MODERATE: SOB at rest, SOB with minimal exertion and prefers to sit, cannot lie down flat, speaks in phrases, mild retractions, audible wheezing, pulse 100-120.     - SEVERE: Very SOB at rest, speaks in single words, struggling to breathe, sitting hunched forward, retractions, pulse > 120       No  6. FEVER: \"Do you have a fever?\" If Yes, ask: \"What is your temperature, how was it measured, and when did it start?\"      Yes low grade per patient Thursday or Friday 100.0 forehead  7. CARDIAC HISTORY: \"Do you have any history of heart disease?\" (e.g., heart attack, congestive heart failure)       No  8. LUNG HISTORY: \"Do you have any history of lung " "disease?\"  (e.g., pulmonary embolus, asthma, emphysema)      Asthma as child  9. PE RISK FACTORS: \"Do you have a history of blood clots?\" (or: recent major surgery, recent prolonged travel, bedridden)      No  10. OTHER SYMPTOMS: \"Do you have any other symptoms?\" (e.g., runny nose, wheezing, chest pain)        Runny nose, chest pain with cough  11. PREGNANCY: \"Is there any chance you are pregnant?\" \"When was your last menstrual period?\"        No  12. TRAVEL: \"Have you traveled out of the country in the last month?\" (e.g., travel history, exposures)        No    Protocols used: Cough - Acute Non-Productive-A-  Samara PAGAN RN [facility]     "

## 2025-05-18 ENCOUNTER — NURSE TRIAGE (OUTPATIENT)
Dept: NURSING | Facility: CLINIC | Age: 50
End: 2025-05-18

## 2025-05-18 ENCOUNTER — OFFICE VISIT (OUTPATIENT)
Dept: URGENT CARE | Facility: URGENT CARE | Age: 50
End: 2025-05-18
Payer: COMMERCIAL

## 2025-05-18 VITALS
OXYGEN SATURATION: 96 % | SYSTOLIC BLOOD PRESSURE: 127 MMHG | DIASTOLIC BLOOD PRESSURE: 75 MMHG | HEIGHT: 63 IN | TEMPERATURE: 98.8 F | HEART RATE: 72 BPM | WEIGHT: 154 LBS | RESPIRATION RATE: 18 BRPM | BODY MASS INDEX: 27.29 KG/M2

## 2025-05-18 DIAGNOSIS — J45.20 MILD INTERMITTENT ASTHMATIC BRONCHITIS WITHOUT COMPLICATION: Primary | ICD-10-CM

## 2025-05-18 PROCEDURE — 3074F SYST BP LT 130 MM HG: CPT | Performed by: PHYSICIAN ASSISTANT

## 2025-05-18 PROCEDURE — 3078F DIAST BP <80 MM HG: CPT | Performed by: PHYSICIAN ASSISTANT

## 2025-05-18 PROCEDURE — 99214 OFFICE O/P EST MOD 30 MIN: CPT | Performed by: PHYSICIAN ASSISTANT

## 2025-05-18 RX ORDER — BENZONATATE 200 MG/1
200 CAPSULE ORAL 3 TIMES DAILY PRN
Qty: 30 CAPSULE | Refills: 0 | Status: SHIPPED | OUTPATIENT
Start: 2025-05-18 | End: 2025-05-28

## 2025-05-18 RX ORDER — ALBUTEROL SULFATE 90 UG/1
2 INHALANT RESPIRATORY (INHALATION) EVERY 4 HOURS PRN
Qty: 18 G | Refills: 0 | Status: SHIPPED | OUTPATIENT
Start: 2025-05-18

## 2025-05-18 RX ORDER — AZITHROMYCIN 250 MG/1
TABLET, FILM COATED ORAL
Qty: 6 TABLET | Refills: 0 | Status: SHIPPED | OUTPATIENT
Start: 2025-05-18

## 2025-05-18 ASSESSMENT — ENCOUNTER SYMPTOMS
SINUS PRESSURE: 0
NAUSEA: 0
FATIGUE: 1
CARDIOVASCULAR NEGATIVE: 1
VOMITING: 0
DIARRHEA: 0
COUGH: 1
SHORTNESS OF BREATH: 1
SINUS PAIN: 0
CHILLS: 0
FEVER: 1
PALPITATIONS: 0
WHEEZING: 0
SORE THROAT: 0
RHINORRHEA: 1

## 2025-05-18 NOTE — PROGRESS NOTES
Darlin Topete is a 49 year old, presenting for the following health issues:  Cough (Was seen at urgent are yesterday [facility] chest xray was negative) and Fever    HPI    Acute Illness  Acute illness concerns:   Onset/Duration: 5days.  Was initially seen at [place] urgent care in which CXR was negative.  Also reports home covid test as well.  Symptoms:  Fever: YES, 102.6  Chills/Sweats: YES  Headache (location?): No  Sinus Pressure: No  Conjunctivitis:  No  Ear Pain: no  Rhinorrhea: YES  Congestion: YES  Sore Throat: No  Cough: YES-productive of yellow sputum, with shortness of breath  Wheeze: No  Decreased Appetite: No  Nausea: No  Vomiting: No  Diarrhea: No  Dysuria/Freq.: No  Dysuria or Hematuria: No  Fatigue/Achiness: YES  Sick/Strep Exposure: YES- daughter had pneumonia  Therapies tried and outcome: rest,fluids,nyquil,tylenol,ibuprofen with some relief    Patient Active Problem List   Diagnosis    Dysplasia of cervix    CARDIOVASCULAR SCREENING; LDL GOAL LESS THAN 160    Family history of malignant neoplasm of breast    YANIRA (generalized anxiety disorder)    Family history of hemochromatosis    Hereditary hemochromatosis    Endometrium, polyp    Cyst of left ovary    Basal cell carcinoma (BCC) of back    Hip pain, right     Current Outpatient Medications   Medication Sig Dispense Refill    albuterol (PROAIR HFA/PROVENTIL HFA/VENTOLIN HFA) 108 (90 Base) MCG/ACT inhaler Inhale 2 puffs into the lungs every 6 hours as needed for shortness of breath, wheezing or cough. 18 g 1    buPROPion (WELLBUTRIN SR) 100 MG 12 hr tablet Take 1 tablet (100 mg) by mouth 2 times daily. 180 tablet 1    estradiol (VIVELLE-DOT) 0.025 MG/24HR bi-weekly patch Place 1 patch onto the skin.      fluocinonide (LIDEX) 0.05 % external cream Apply topically 2 times daily. 60 g 1    ibuprofen (ADVIL/MOTRIN) 600 MG tablet Take 1 tablet (600 mg) by mouth every 6 hours as needed for other (mild and/or inflammatory pain) 30 tablet 0     "LORazepam (ATIVAN) 0.5 MG tablet TAKE 1 TABLET(0.5 MG) BY MOUTH EVERY 8 HOURS AS NEEDED FOR ANXIETY 20 tablet 0    Omega-3 Fatty Acids (OMEGA-3 FISH OIL PO) Take 1 g by mouth daily       progesterone (PROMETRIUM) 200 MG capsule 200 mg.      sertraline (ZOLOFT) 50 MG tablet TAKE 1 AND 1/2 TABLETS BY MOUTH DAILY 135 tablet 1    traZODone (DESYREL) 50 MG tablet Take 1 tablet (50 mg) by mouth at bedtime. 90 tablet 3    triamcinolone (ARISTOCORT HP) 0.5 % external cream Apply topically 2 times daily 15 g 0     No current facility-administered medications for this visit.        Allergies   Allergen Reactions    Adhesive Tape      EKG patches cause a rash    Amoxicillin Rash     Review of Systems   Constitutional:  Positive for fatigue and fever. Negative for chills.   HENT:  Positive for congestion and rhinorrhea. Negative for ear pain, sinus pressure, sinus pain and sore throat.    Respiratory:  Positive for cough and shortness of breath. Negative for wheezing.    Cardiovascular: Negative.  Negative for chest pain, palpitations and leg swelling.   Gastrointestinal:  Negative for diarrhea, nausea and vomiting.   All other systems reviewed and are negative.          Objective    /75 (BP Location: Left arm, Patient Position: Sitting, Cuff Size: Adult Regular)   Pulse 72   Temp 98.8  F (37.1  C) (Tympanic)   Resp 18   Ht 1.6 m (5' 3\")   Wt 69.9 kg (154 lb)   LMP  (LMP Unknown)   SpO2 96%   BMI 27.28 kg/m    There is no height or weight on file to calculate BMI.  Physical Exam  Vitals and nursing note reviewed.   Constitutional:       General: She is not in acute distress.     Appearance: Normal appearance. She is well-developed and normal weight. She is not ill-appearing.   HENT:      Head: Normocephalic and atraumatic.      Comments: TMs are intact without any erythema or bulging bilaterally.  Airway is patent.     Nose: Nose normal.      Mouth/Throat:      Lips: Pink.      Mouth: Mucous membranes are moist. "      Pharynx: Oropharynx is clear. Uvula midline. No pharyngeal swelling, oropharyngeal exudate or posterior oropharyngeal erythema.      Tonsils: No tonsillar exudate or tonsillar abscesses.   Eyes:      General: No scleral icterus.     Extraocular Movements: Extraocular movements intact.      Conjunctiva/sclera: Conjunctivae normal.      Pupils: Pupils are equal, round, and reactive to light.   Neck:      Thyroid: No thyromegaly.   Cardiovascular:      Rate and Rhythm: Normal rate and regular rhythm.      Pulses: Normal pulses.      Heart sounds: Normal heart sounds, S1 normal and S2 normal. No murmur heard.     No friction rub. No gallop.   Pulmonary:      Effort: Pulmonary effort is normal. No accessory muscle usage, respiratory distress or retractions.      Breath sounds: Normal breath sounds and air entry. No stridor. No decreased breath sounds, wheezing, rhonchi or rales.   Musculoskeletal:      Cervical back: Normal range of motion and neck supple.   Lymphadenopathy:      Cervical: No cervical adenopathy.   Skin:     General: Skin is warm and dry.      Nails: There is no clubbing.   Neurological:      Mental Status: She is alert and oriented to person, place, and time.   Psychiatric:         Mood and Affect: Mood normal.         Behavior: Behavior normal.         Thought Content: Thought content normal.         Judgment: Judgment normal.              Assessment/Plan:  Mild intermittent asthmatic bronchitis without complication:  Recent CXR was negative for pneumonia.  Will treat with zithromax X5days, tessalon perles, and albuterol inh as needed for symptoms.  Recommend treatment with rest, fluids and chicken soup. Tylenol/ibuprofen prn fever/pain.  Recheck in clinic if symptoms worsen or if symptoms do not improve.  To the ER if she develops hemoptysis, chest pain, fevers>102, worsening shortness of breath/wheezing.    -     azithromycin (ZITHROMAX) 250 MG tablet; 2 tablets the first day, then 1 tablet  daily for the next 4 days  -     benzonatate (TESSALON) 200 MG capsule; Take 1 capsule (200 mg) by mouth 3 times daily as needed for cough.  -     albuterol (PROAIR HFA/PROVENTIL HFA/VENTOLIN HFA) 108 (90 Base) MCG/ACT inhaler; Inhale 2 puffs into the lungs every 4 hours as needed for shortness of breath or cough. Use with spacer        [employer] See DONY Rossi

## 2025-05-18 NOTE — TELEPHONE ENCOUNTER
Patient was seen in  yesterday but is now having worsening symptoms. Patient woke up this morning with fever of 102.6. Patient has been treating with ibuprofen and tylenol. Patient also feeling short of breath with activity. Cough is wet sounding but not productive and more frequent. Patient has headache pain in her whole head but also over her sinuses. Patient also has sinus congestion and drainage. Today her sore throat is worse and throat feels swollen. Cold symptoms have been since Wednesday.   Covid home test negative.   Protocol recommends see HCP within 4 hours  Care advice given. Patient will present to  and call back with worsening symptoms.   Spohia Galdamez RN   05/18/25 2:26 PM  [facility] Nurse [facility]        Reason for Disposition   [1] MILD difficulty breathing (e.g., minimal/no SOB at rest, SOB with walking, pulse <100) AND [2] still present when not coughing    Additional Information   Negative: SEVERE difficulty breathing (e.g., struggling for each breath, speaks in single words)   Negative: Bluish (or gray) lips or face now   Negative: [1] Rapid onset of cough AND [2] has hives   Negative: Coughing started suddenly after medicine, an allergic food or bee sting   Negative: [1] Difficulty breathing AND [2] exposure to flames, smoke, or fumes   Negative: [1] Stridor AND [2] difficulty breathing   Negative: Sounds like a life-threatening emergency to the triager   Negative: Choked on object of food that could be caught in the throat   Negative: Chest pain is main symptom   Negative: [1] Previous asthma attacks AND [2] this feels like asthma attack   Negative: Cough lasts > 3 weeks   Negative: Wet cough (productive; white-yellow, yellow, green, or corrina colored sputum)   Negative: [1] Dry cough (non-productive;  no sputum or minimal clear sputum) AND [2] within 14 days of COVID-19 Exposure   Negative: [1] MODERATE difficulty breathing (e.g., speaks in phrases, SOB even at rest, pulse 100-120)  AND [2] still present when not coughing   Negative: Chest pain  (Exception: MILD central chest pain, present only when coughing.)   Negative: Patient sounds very sick or weak to the triager    Protocols used: Cough - Acute Non-Productive-A-AH

## 2025-06-06 NOTE — TELEPHONE ENCOUNTER
Pending Prescriptions:                       Disp   Refills    sertraline (ZOLOFT) 50 MG tablet [Pharmac*81 tab*1            Sig: TAKE ONE-HALF TABLET BY MOUTH DAILY FOR 1 TO 2           WEEKS, THEN INCREASE TO 1 TABLET BY MOUTH DAILY         Last Written Prescription Date: 05/26/2017  Last Fill Quantity: 30, # refills: 1  Last Office Visit with [facility] primary care provider:  05/26/2017        Last PHQ-9 score on record=   PHQ-9 SCORE 9/30/2016   Total Score -   Total Score 2                  N/A

## 2025-08-05 ENCOUNTER — MYC MEDICAL ADVICE (OUTPATIENT)
Dept: FAMILY MEDICINE | Facility: CLINIC | Age: 50
End: 2025-08-05
Payer: COMMERCIAL

## (undated) DEVICE — DEVICE TISSUE REMOVAL HYSTEROSCOPIC MYOSURE LITE 30-401LITE

## (undated) DEVICE — LINEN TOWEL PACK X5 5464

## (undated) DEVICE — LINEN GOWN X4 5410

## (undated) DEVICE — SNARE CAPIVATOR ROUND COLD SNR BX10 M00561101

## (undated) DEVICE — SOL WATER IRRIG 500ML BOTTLE 2F7113

## (undated) DEVICE — SPECIMEN CONTAINER 3OZ W/FORMALIN 59901

## (undated) DEVICE — GLOVE PROTEXIS W/NEU-THERA 6.0  2D73TE60

## (undated) DEVICE — GOWN IMPERVIOUS 2XL BLUE

## (undated) DEVICE — ENDO FORCEP SPIKED SERRATED SHAFT JUMBO 239CM G56998

## (undated) DEVICE — GLOVE PROTEXIS BLUE W/NEU-THERA 6.5  2D73EB65

## (undated) DEVICE — PAD CHUX UNDERPAD 30X36" P3036C

## (undated) DEVICE — SOL WATER IRRIG 1000ML BOTTLE 2F7114

## (undated) DEVICE — SEAL SET MYOSURE ROD LENS SCOPE SINGLE USE 40-902

## (undated) DEVICE — STRAP KNEE/BODY 31143004

## (undated) DEVICE — SOL NACL 0.9% IRRIG 3000ML BAG 2B7477

## (undated) DEVICE — SUCTION MANIFOLD NEPTUNE 2 SYS 1 PORT 702-025-000

## (undated) DEVICE — KIT PROCEDURE FLUENT IN/OUT FLOWPAK TISS TRAP FLT-112S

## (undated) DEVICE — Device

## (undated) DEVICE — SOL NACL 0.9% IRRIG 1000ML BOTTLE 2F7124

## (undated) DEVICE — TUBING SUCTION 12"X1/4" N612

## (undated) DEVICE — KIT ENDO TURNOVER/PROCEDURE CARRY-ON 101822

## (undated) DEVICE — GOWN XLG DISP 9545

## (undated) RX ORDER — PROPOFOL 10 MG/ML
INJECTION, EMULSION INTRAVENOUS
Status: DISPENSED
Start: 2021-02-12

## (undated) RX ORDER — HYDROMORPHONE HYDROCHLORIDE 1 MG/ML
INJECTION, SOLUTION INTRAMUSCULAR; INTRAVENOUS; SUBCUTANEOUS
Status: DISPENSED
Start: 2021-02-12

## (undated) RX ORDER — FENTANYL CITRATE 50 UG/ML
INJECTION, SOLUTION INTRAMUSCULAR; INTRAVENOUS
Status: DISPENSED
Start: 2021-02-12

## (undated) RX ORDER — LIDOCAINE HYDROCHLORIDE 10 MG/ML
INJECTION, SOLUTION EPIDURAL; INFILTRATION; INTRACAUDAL; PERINEURAL
Status: DISPENSED
Start: 2021-02-12

## (undated) RX ORDER — LIDOCAINE HYDROCHLORIDE 20 MG/ML
INJECTION, SOLUTION EPIDURAL; INFILTRATION; INTRACAUDAL; PERINEURAL
Status: DISPENSED
Start: 2021-02-12